# Patient Record
Sex: MALE | Race: WHITE | Employment: OTHER | URBAN - METROPOLITAN AREA
[De-identification: names, ages, dates, MRNs, and addresses within clinical notes are randomized per-mention and may not be internally consistent; named-entity substitution may affect disease eponyms.]

---

## 2020-05-02 ENCOUNTER — HOSPITAL ENCOUNTER (EMERGENCY)
Age: 70
Discharge: HOME | End: 2020-05-02
Attending: EMERGENCY MEDICINE
Payer: MEDICARE

## 2020-05-02 VITALS
SYSTOLIC BLOOD PRESSURE: 145 MMHG | OXYGEN SATURATION: 98 % | RESPIRATION RATE: 18 BRPM | WEIGHT: 315 LBS | BODY MASS INDEX: 38.36 KG/M2 | DIASTOLIC BLOOD PRESSURE: 87 MMHG | HEIGHT: 76 IN | HEART RATE: 100 BPM | TEMPERATURE: 98.3 F

## 2020-05-02 LAB
ANION GAP SERPL CALCULATED.3IONS-SCNC: 19 MMOL/L (ref 7–16)
BACTERIA: ABNORMAL /HPF
BASOPHILS ABSOLUTE: 0.06 E9/L (ref 0–0.2)
BASOPHILS RELATIVE PERCENT: 0.5 % (ref 0–2)
BILIRUBIN URINE: ABNORMAL
BLOOD, URINE: ABNORMAL
BUN BLDV-MCNC: 15 MG/DL (ref 8–23)
CALCIUM SERPL-MCNC: 9.5 MG/DL (ref 8.6–10.2)
CHLORIDE BLD-SCNC: 98 MMOL/L (ref 98–107)
CLARITY: ABNORMAL
CO2: 22 MMOL/L (ref 22–29)
COLOR: ABNORMAL
CREAT SERPL-MCNC: 1 MG/DL (ref 0.7–1.2)
EOSINOPHILS ABSOLUTE: 0.06 E9/L (ref 0.05–0.5)
EOSINOPHILS RELATIVE PERCENT: 0.5 % (ref 0–6)
EPITHELIAL CELLS, UA: ABNORMAL /HPF
GFR AFRICAN AMERICAN: >60
GFR NON-AFRICAN AMERICAN: >60 ML/MIN/1.73
GLUCOSE BLD-MCNC: 244 MG/DL (ref 74–99)
GLUCOSE URINE: 500 MG/DL
HCT VFR BLD CALC: 42.9 % (ref 37–54)
HEMOGLOBIN: 14.6 G/DL (ref 12.5–16.5)
IMMATURE GRANULOCYTES #: 0.05 E9/L
IMMATURE GRANULOCYTES %: 0.4 % (ref 0–5)
KETONES, URINE: NEGATIVE MG/DL
LEUKOCYTE ESTERASE, URINE: ABNORMAL
LYMPHOCYTES ABSOLUTE: 1.71 E9/L (ref 1.5–4)
LYMPHOCYTES RELATIVE PERCENT: 13.7 % (ref 20–42)
MAGNESIUM: 1.7 MG/DL (ref 1.6–2.6)
MCH RBC QN AUTO: 29 PG (ref 26–35)
MCHC RBC AUTO-ENTMCNC: 34 % (ref 32–34.5)
MCV RBC AUTO: 85.1 FL (ref 80–99.9)
MONOCYTES ABSOLUTE: 0.83 E9/L (ref 0.1–0.95)
MONOCYTES RELATIVE PERCENT: 6.6 % (ref 2–12)
NEUTROPHILS ABSOLUTE: 9.81 E9/L (ref 1.8–7.3)
NEUTROPHILS RELATIVE PERCENT: 78.3 % (ref 43–80)
NITRITE, URINE: NEGATIVE
PDW BLD-RTO: 13.7 FL (ref 11.5–15)
PH UA: 5.5 (ref 5–9)
PLATELET # BLD: 179 E9/L (ref 130–450)
PMV BLD AUTO: 9.5 FL (ref 7–12)
POTASSIUM REFLEX MAGNESIUM: 3.3 MMOL/L (ref 3.5–5)
PROTEIN UA: >=300 MG/DL
RBC # BLD: 5.04 E12/L (ref 3.8–5.8)
RBC UA: ABNORMAL /HPF (ref 0–2)
SODIUM BLD-SCNC: 139 MMOL/L (ref 132–146)
SPECIFIC GRAVITY UA: >=1.03 (ref 1–1.03)
TROPONIN: <0.01 NG/ML (ref 0–0.03)
UROBILINOGEN, URINE: 1 E.U./DL
WBC # BLD: 12.5 E9/L (ref 4.5–11.5)
WBC UA: >20 /HPF (ref 0–5)

## 2020-05-02 PROCEDURE — 84484 ASSAY OF TROPONIN QUANT: CPT

## 2020-05-02 PROCEDURE — 80048 BASIC METABOLIC PNL TOTAL CA: CPT

## 2020-05-02 PROCEDURE — 51702 INSERT TEMP BLADDER CATH: CPT

## 2020-05-02 PROCEDURE — 81001 URINALYSIS AUTO W/SCOPE: CPT

## 2020-05-02 PROCEDURE — 85025 COMPLETE CBC W/AUTO DIFF WBC: CPT

## 2020-05-02 PROCEDURE — 99283 EMERGENCY DEPT VISIT LOW MDM: CPT

## 2020-05-02 PROCEDURE — 93005 ELECTROCARDIOGRAM TRACING: CPT | Performed by: STUDENT IN AN ORGANIZED HEALTH CARE EDUCATION/TRAINING PROGRAM

## 2020-05-02 PROCEDURE — 87077 CULTURE AEROBIC IDENTIFY: CPT

## 2020-05-02 PROCEDURE — 87088 URINE BACTERIA CULTURE: CPT

## 2020-05-02 PROCEDURE — 6370000000 HC RX 637 (ALT 250 FOR IP)

## 2020-05-02 PROCEDURE — 6370000000 HC RX 637 (ALT 250 FOR IP): Performed by: STUDENT IN AN ORGANIZED HEALTH CARE EDUCATION/TRAINING PROGRAM

## 2020-05-02 PROCEDURE — 83735 ASSAY OF MAGNESIUM: CPT

## 2020-05-02 PROCEDURE — 87186 SC STD MICRODIL/AGAR DIL: CPT

## 2020-05-02 PROCEDURE — 2580000003 HC RX 258: Performed by: STUDENT IN AN ORGANIZED HEALTH CARE EDUCATION/TRAINING PROGRAM

## 2020-05-02 RX ORDER — CIPROFLOXACIN 500 MG/1
500 TABLET, FILM COATED ORAL 2 TIMES DAILY
COMMUNITY
End: 2020-06-01

## 2020-05-02 RX ORDER — CEFDINIR 300 MG/1
300 CAPSULE ORAL ONCE
Status: COMPLETED | OUTPATIENT
Start: 2020-05-02 | End: 2020-05-02

## 2020-05-02 RX ORDER — POTASSIUM CHLORIDE 20 MEQ/1
20 TABLET, EXTENDED RELEASE ORAL ONCE
Status: COMPLETED | OUTPATIENT
Start: 2020-05-02 | End: 2020-05-02

## 2020-05-02 RX ORDER — 0.9 % SODIUM CHLORIDE 0.9 %
1000 INTRAVENOUS SOLUTION INTRAVENOUS ONCE
Status: COMPLETED | OUTPATIENT
Start: 2020-05-02 | End: 2020-05-02

## 2020-05-02 RX ORDER — CEFDINIR 300 MG/1
300 CAPSULE ORAL 2 TIMES DAILY
Qty: 14 CAPSULE | Refills: 0 | Status: SHIPPED | OUTPATIENT
Start: 2020-05-02 | End: 2020-05-09

## 2020-05-02 RX ORDER — CEFDINIR 300 MG/1
300 CAPSULE ORAL DAILY
Status: DISCONTINUED | OUTPATIENT
Start: 2020-05-03 | End: 2020-05-02

## 2020-05-02 RX ORDER — CEFDINIR 300 MG/1
CAPSULE ORAL
Status: COMPLETED
Start: 2020-05-02 | End: 2020-05-02

## 2020-05-02 RX ADMIN — SODIUM CHLORIDE 1000 ML: 9 INJECTION, SOLUTION INTRAVENOUS at 22:26

## 2020-05-02 RX ADMIN — CEFDINIR 300 MG: 300 CAPSULE ORAL at 21:31

## 2020-05-02 RX ADMIN — POTASSIUM CHLORIDE 20 MEQ: 1500 TABLET, EXTENDED RELEASE ORAL at 21:30

## 2020-05-02 ASSESSMENT — PAIN DESCRIPTION - PAIN TYPE: TYPE: ACUTE PAIN

## 2020-05-02 ASSESSMENT — ENCOUNTER SYMPTOMS
PHOTOPHOBIA: 0
ABDOMINAL PAIN: 0
SHORTNESS OF BREATH: 0
NAUSEA: 0
DIARRHEA: 0
ABDOMINAL DISTENTION: 0

## 2020-05-02 ASSESSMENT — PAIN DESCRIPTION - DESCRIPTORS: DESCRIPTORS: BURNING

## 2020-05-02 NOTE — ED PROVIDER NOTES
medical history of Diabetes mellitus ([facility] [address] Hypertension, and Sleep apnea. Past Surgical History:  has a past surgical history that includes Cholecystectomy (1995); Tonsillectomy; and TURP (08/04/2017). Social History:  reports that he has never smoked. He does not have any smokeless tobacco history on file. He reports that he does not drink alcohol or use drugs. Family History: family history is not on file. The patients home medications have been reviewed.     Allergies: Penicillins    -------------------------------------------------- RESULTS -------------------------------------------------  Labs:  Results for orders placed or performed during the hospital encounter of 05/02/20   Urinalysis with Microscopic   Result Value Ref Range    Color, UA FROILAN (A) Straw/Yellow    Clarity, UA CLOUDY (A) Clear    Glucose, Ur 500 (A) Negative mg/dL    Bilirubin Urine MODERATE (A) Negative    Ketones, Urine Negative Negative mg/dL    Specific Gravity, UA >=1.030 1.005 - 1.030    Blood, Urine LARGE (A) Negative    pH, UA 5.5 5.0 - 9.0    Protein, UA >=300 (A) Negative mg/dL    Urobilinogen, Urine 1.0 <2.0 E.U./dL    Nitrite, Urine Negative Negative    Leukocyte Esterase, Urine TRACE (A) Negative    WBC, UA >20 (A) 0 - 5 /HPF    RBC, UA PACKED 0 - 2 /HPF    Epithelial Cells, UA NONE SEEN /HPF    Bacteria, UA MANY (A) None Seen /HPF   CBC auto differential   Result Value Ref Range    WBC 12.5 (H) 4.5 - 11.5 E9/L    RBC 5.04 3.80 - 5.80 E12/L    Hemoglobin 14.6 12.5 - 16.5 g/dL    Hematocrit 42.9 37.0 - 54.0 %    MCV 85.1 80.0 - 99.9 fL    MCH 29.0 26.0 - 35.0 pg    MCHC 34.0 32.0 - 34.5 %    RDW 13.7 11.5 - 15.0 fL    Platelets 031 712 - 750 E9/L    MPV 9.5 7.0 - 12.0 fL    Neutrophils % 78.3 43.0 - 80.0 %    Immature Granulocytes % 0.4 0.0 - 5.0 %    Lymphocytes % 13.7 (L) 20.0 - 42.0 %    Monocytes % 6.6 2.0 - 12.0 %    Eosinophils % 0.5 0.0 - 6.0 %    Basophils % 0.5 0.0 - 2.0 %    Neutrophils Absolute 9.81 (H) 1.80 - 7.30 E9/L    Immature Granulocytes # 0.05 E9/L    Lymphocytes Absolute 1.71 1.50 - 4.00 E9/L    Monocytes Absolute 0.83 0.10 - 0.95 E9/L    Eosinophils Absolute 0.06 0.05 - 0.50 E9/L    Basophils Absolute 0.06 0.00 - 0.20 J8/V   Basic Metabolic Panel w/ Reflex to MG   Result Value Ref Range    Sodium 139 132 - 146 mmol/L    Potassium reflex Magnesium 3.3 (L) 3.5 - 5.0 mmol/L    Chloride 98 98 - 107 mmol/L    CO2 22 22 - 29 mmol/L    Anion Gap 19 (H) 7 - 16 mmol/L    Glucose 244 (H) 74 - 99 mg/dL    BUN 15 8 - 23 mg/dL    CREATININE 1.0 0.7 - 1.2 mg/dL    GFR Non-African American >60 >=60 mL/min/1.73    GFR African American >60     Calcium 9.5 8.6 - 10.2 mg/dL   Magnesium   Result Value Ref Range    Magnesium 1.7 1.6 - 2.6 mg/dL   Troponin   Result Value Ref Range    Troponin <0.01 0.00 - 0.03 ng/mL   EKG 12 Lead   Result Value Ref Range    Ventricular Rate 117 BPM    Atrial Rate 117 BPM    P-R Interval 150 ms    QRS Duration 134 ms    Q-T Interval 358 ms    QTc Calculation (Bazett) 499 ms    P Axis 54 degrees    R Axis -92 degrees    T Axis 36 degrees       Radiology:  No orders to display       ------------------------- NURSING NOTES AND VITALS REVIEWED ---------------------------  Date / Time Roomed:  5/2/2020  7:51 PM  ED Bed Assignment:  07/07    The nursing notes within the ED encounter and vital signs as below have been reviewed. BP (!) 145/87   Pulse 100   Temp 98.3 °F (36.8 °C) (Oral)   Resp 18   Ht 6' 4\" (1.93 m)   Wt (!) 350 lb (158.8 kg)   SpO2 98%   BMI 42.60 kg/m²   Oxygen Saturation Interpretation: Normal      ------------------------------------------ PROGRESS NOTES ------------------------------------------  12:11 PM EDT  I have spoken with the patient and discussed todays results, in addition to providing specific details for the plan of care and counseling regarding the diagnosis and prognosis. Their questions are answered at this time and they are agreeable with the plan.  I

## 2020-05-03 LAB
EKG ATRIAL RATE: 117 BPM
EKG P AXIS: 54 DEGREES
EKG P-R INTERVAL: 150 MS
EKG Q-T INTERVAL: 358 MS
EKG QRS DURATION: 134 MS
EKG QTC CALCULATION (BAZETT): 499 MS
EKG R AXIS: -92 DEGREES
EKG T AXIS: 36 DEGREES
EKG VENTRICULAR RATE: 117 BPM

## 2020-05-03 NOTE — ED NOTES
RN provided teaching of carney cath. RN provided a leg bag and standard. Pt states that he would like to leave standard on because he will be going to bed when he gets home. Pt verbalized understanding. Pt called wife for ride. PT was ambulatory upon discharge.       Nancy Ortiz RN  05/02/20 0069

## 2020-05-06 LAB
ORGANISM: ABNORMAL
URINE CULTURE, ROUTINE: ABNORMAL

## 2020-05-28 ENCOUNTER — PREP FOR PROCEDURE (OUTPATIENT)
Dept: UROLOGY | Age: 70
End: 2020-05-28

## 2020-05-28 RX ORDER — SODIUM CHLORIDE 9 MG/ML
INJECTION, SOLUTION INTRAVENOUS CONTINUOUS
Status: CANCELLED | OUTPATIENT
Start: 2020-05-28

## 2020-05-28 RX ORDER — SODIUM CHLORIDE 0.9 % (FLUSH) 0.9 %
10 SYRINGE (ML) INJECTION PRN
Status: CANCELLED | OUTPATIENT
Start: 2020-05-28

## 2020-05-28 RX ORDER — CIPROFLOXACIN 2 MG/ML
400 INJECTION, SOLUTION INTRAVENOUS
Status: CANCELLED | OUTPATIENT
Start: 2020-05-28 | End: 2020-05-28

## 2020-05-28 RX ORDER — SODIUM CHLORIDE 0.9 % (FLUSH) 0.9 %
10 SYRINGE (ML) INJECTION EVERY 12 HOURS SCHEDULED
Status: CANCELLED | OUTPATIENT
Start: 2020-05-28

## 2020-06-01 ENCOUNTER — HOSPITAL ENCOUNTER (OUTPATIENT)
Dept: PREADMISSION TESTING | Age: 70
Discharge: HOME | End: 2020-06-01
Payer: MEDICARE

## 2020-06-01 ENCOUNTER — HOSPITAL ENCOUNTER (OUTPATIENT)
Age: 70
Discharge: HOME | End: 2020-06-03
Payer: MEDICARE

## 2020-06-01 VITALS
OXYGEN SATURATION: 95 % | HEIGHT: 74 IN | TEMPERATURE: 97 F | DIASTOLIC BLOOD PRESSURE: 91 MMHG | HEART RATE: 106 BPM | RESPIRATION RATE: 20 BRPM | WEIGHT: 315 LBS | BODY MASS INDEX: 40.43 KG/M2 | SYSTOLIC BLOOD PRESSURE: 157 MMHG

## 2020-06-01 LAB
ANION GAP SERPL CALCULATED.3IONS-SCNC: 14 MMOL/L (ref 7–16)
BUN BLDV-MCNC: 15 MG/DL (ref 8–23)
CALCIUM SERPL-MCNC: 9.6 MG/DL (ref 8.6–10.2)
CHLORIDE BLD-SCNC: 98 MMOL/L (ref 98–107)
CO2: 27 MMOL/L (ref 22–29)
CREAT SERPL-MCNC: 1 MG/DL (ref 0.7–1.2)
GFR AFRICAN AMERICAN: >60
GFR NON-AFRICAN AMERICAN: >60 ML/MIN/1.73
GLUCOSE BLD-MCNC: 209 MG/DL (ref 74–99)
HCT VFR BLD CALC: 42.3 % (ref 37–54)
HEMOGLOBIN: 14.2 G/DL (ref 12.5–16.5)
MAGNESIUM: 2 MG/DL (ref 1.6–2.6)
MCH RBC QN AUTO: 29.1 PG (ref 26–35)
MCHC RBC AUTO-ENTMCNC: 33.6 % (ref 32–34.5)
MCV RBC AUTO: 86.7 FL (ref 80–99.9)
PDW BLD-RTO: 13.9 FL (ref 11.5–15)
PLATELET # BLD: 187 E9/L (ref 130–450)
PMV BLD AUTO: 9.2 FL (ref 7–12)
POTASSIUM REFLEX MAGNESIUM: 3.4 MMOL/L (ref 3.5–5)
RBC # BLD: 4.88 E12/L (ref 3.8–5.8)
SODIUM BLD-SCNC: 139 MMOL/L (ref 132–146)
WBC # BLD: 7.4 E9/L (ref 4.5–11.5)

## 2020-06-01 PROCEDURE — 36415 COLL VENOUS BLD VENIPUNCTURE: CPT

## 2020-06-01 PROCEDURE — 85027 COMPLETE CBC AUTOMATED: CPT

## 2020-06-01 PROCEDURE — 83735 ASSAY OF MAGNESIUM: CPT

## 2020-06-01 PROCEDURE — U0003 INFECTIOUS AGENT DETECTION BY NUCLEIC ACID (DNA OR RNA); SEVERE ACUTE RESPIRATORY SYNDROME CORONAVIRUS 2 (SARS-COV-2) (CORONAVIRUS DISEASE [COVID-19]), AMPLIFIED PROBE TECHNIQUE, MAKING USE OF HIGH THROUGHPUT TECHNOLOGIES AS DESCRIBED BY CMS-2020-01-R: HCPCS

## 2020-06-01 PROCEDURE — 80048 BASIC METABOLIC PNL TOTAL CA: CPT

## 2020-06-01 RX ORDER — MULTIVIT-MIN/FERROUS GLUCONATE 9 MG/15 ML
15 LIQUID (ML) ORAL DAILY
COMMUNITY

## 2020-06-01 RX ORDER — BETHANECHOL CHLORIDE 25 MG/1
25 TABLET ORAL 3 TIMES DAILY
Status: ON HOLD | COMMUNITY
End: 2022-03-24

## 2020-06-01 RX ORDER — OXYMETAZOLINE HYDROCHLORIDE 0.05 G/100ML
2 SPRAY NASAL PRN
COMMUNITY

## 2020-06-03 LAB
SARS-COV-2: NOT DETECTED
SOURCE: NORMAL

## 2020-06-05 ENCOUNTER — HOSPITAL ENCOUNTER (OUTPATIENT)
Age: 70
Setting detail: OUTPATIENT SURGERY
Discharge: HOME | End: 2020-06-05
Attending: UROLOGY | Admitting: UROLOGY
Payer: MEDICARE

## 2020-06-05 ENCOUNTER — ANESTHESIA EVENT (OUTPATIENT)
Dept: OPERATING ROOM | Age: 70
End: 2020-06-05
Payer: MEDICARE

## 2020-06-05 ENCOUNTER — ANESTHESIA (OUTPATIENT)
Dept: OPERATING ROOM | Age: 70
End: 2020-06-05
Payer: MEDICARE

## 2020-06-05 ENCOUNTER — HOSPITAL ENCOUNTER (OUTPATIENT)
Dept: GENERAL RADIOLOGY | Age: 70
Discharge: HOME | End: 2020-06-07
Payer: MEDICARE

## 2020-06-05 VITALS
DIASTOLIC BLOOD PRESSURE: 51 MMHG | SYSTOLIC BLOOD PRESSURE: 73 MMHG | OXYGEN SATURATION: 97 % | RESPIRATION RATE: 13 BRPM

## 2020-06-05 VITALS
RESPIRATION RATE: 18 BRPM | SYSTOLIC BLOOD PRESSURE: 140 MMHG | DIASTOLIC BLOOD PRESSURE: 77 MMHG | WEIGHT: 315 LBS | HEART RATE: 64 BPM | TEMPERATURE: 97.4 F | BODY MASS INDEX: 40.43 KG/M2 | OXYGEN SATURATION: 98 % | HEIGHT: 74 IN

## 2020-06-05 LAB — METER GLUCOSE: 192 MG/DL (ref 74–99)

## 2020-06-05 PROCEDURE — 74400 UROGRAPHY IV +-KUB TOMOG: CPT

## 2020-06-05 PROCEDURE — 6360000002 HC RX W HCPCS: Performed by: NURSE ANESTHETIST, CERTIFIED REGISTERED

## 2020-06-05 PROCEDURE — C1758 CATHETER, URETERAL: HCPCS | Performed by: UROLOGY

## 2020-06-05 PROCEDURE — 6360000002 HC RX W HCPCS: Performed by: NURSE PRACTITIONER

## 2020-06-05 PROCEDURE — 7100000000 HC PACU RECOVERY - FIRST 15 MIN: Performed by: UROLOGY

## 2020-06-05 PROCEDURE — 3700000001 HC ADD 15 MINUTES (ANESTHESIA): Performed by: UROLOGY

## 2020-06-05 PROCEDURE — 2580000003 HC RX 258: Performed by: NURSE PRACTITIONER

## 2020-06-05 PROCEDURE — 3600000014 HC SURGERY LEVEL 4 ADDTL 15MIN: Performed by: UROLOGY

## 2020-06-05 PROCEDURE — 88305 TISSUE EXAM BY PATHOLOGIST: CPT

## 2020-06-05 PROCEDURE — 2709999900 HC NON-CHARGEABLE SUPPLY: Performed by: UROLOGY

## 2020-06-05 PROCEDURE — 3600000004 HC SURGERY LEVEL 4 BASE: Performed by: UROLOGY

## 2020-06-05 PROCEDURE — 7100000011 HC PHASE II RECOVERY - ADDTL 15 MIN: Performed by: UROLOGY

## 2020-06-05 PROCEDURE — 2720000010 HC SURG SUPPLY STERILE: Performed by: UROLOGY

## 2020-06-05 PROCEDURE — 7100000001 HC PACU RECOVERY - ADDTL 15 MIN: Performed by: UROLOGY

## 2020-06-05 PROCEDURE — 6360000004 HC RX CONTRAST MEDICATION: Performed by: UROLOGY

## 2020-06-05 PROCEDURE — 2500000003 HC RX 250 WO HCPCS: Performed by: NURSE ANESTHETIST, CERTIFIED REGISTERED

## 2020-06-05 PROCEDURE — 82962 GLUCOSE BLOOD TEST: CPT

## 2020-06-05 PROCEDURE — 7100000010 HC PHASE II RECOVERY - FIRST 15 MIN: Performed by: UROLOGY

## 2020-06-05 PROCEDURE — 3700000000 HC ANESTHESIA ATTENDED CARE: Performed by: UROLOGY

## 2020-06-05 RX ORDER — FENTANYL CITRATE 50 UG/ML
INJECTION, SOLUTION INTRAMUSCULAR; INTRAVENOUS PRN
Status: DISCONTINUED | OUTPATIENT
Start: 2020-06-05 | End: 2020-06-05 | Stop reason: SDUPTHER

## 2020-06-05 RX ORDER — CIPROFLOXACIN 2 MG/ML
400 INJECTION, SOLUTION INTRAVENOUS
Status: COMPLETED | OUTPATIENT
Start: 2020-06-05 | End: 2020-06-05

## 2020-06-05 RX ORDER — SODIUM CHLORIDE 0.9 % (FLUSH) 0.9 %
10 SYRINGE (ML) INJECTION EVERY 12 HOURS SCHEDULED
Status: DISCONTINUED | OUTPATIENT
Start: 2020-06-05 | End: 2020-06-05 | Stop reason: HOSPADM

## 2020-06-05 RX ORDER — MEPERIDINE HYDROCHLORIDE 25 MG/ML
12.5 INJECTION INTRAMUSCULAR; INTRAVENOUS; SUBCUTANEOUS EVERY 5 MIN PRN
Status: DISCONTINUED | OUTPATIENT
Start: 2020-06-05 | End: 2020-06-05 | Stop reason: HOSPADM

## 2020-06-05 RX ORDER — EPHEDRINE SULFATE/0.9% NACL/PF 50 MG/5 ML
SYRINGE (ML) INTRAVENOUS PRN
Status: DISCONTINUED | OUTPATIENT
Start: 2020-06-05 | End: 2020-06-05 | Stop reason: SDUPTHER

## 2020-06-05 RX ORDER — LIDOCAINE HYDROCHLORIDE 20 MG/ML
INJECTION, SOLUTION INTRAVENOUS PRN
Status: DISCONTINUED | OUTPATIENT
Start: 2020-06-05 | End: 2020-06-05 | Stop reason: SDUPTHER

## 2020-06-05 RX ORDER — SODIUM CHLORIDE 9 MG/ML
INJECTION, SOLUTION INTRAVENOUS CONTINUOUS
Status: DISCONTINUED | OUTPATIENT
Start: 2020-06-05 | End: 2020-06-05 | Stop reason: HOSPADM

## 2020-06-05 RX ORDER — MIDAZOLAM HYDROCHLORIDE 1 MG/ML
INJECTION INTRAMUSCULAR; INTRAVENOUS PRN
Status: DISCONTINUED | OUTPATIENT
Start: 2020-06-05 | End: 2020-06-05 | Stop reason: SDUPTHER

## 2020-06-05 RX ORDER — PROPOFOL 10 MG/ML
INJECTION, EMULSION INTRAVENOUS CONTINUOUS PRN
Status: DISCONTINUED | OUTPATIENT
Start: 2020-06-05 | End: 2020-06-05 | Stop reason: SDUPTHER

## 2020-06-05 RX ORDER — OXYCODONE HYDROCHLORIDE AND ACETAMINOPHEN 5; 325 MG/1; MG/1
1 TABLET ORAL EVERY 4 HOURS PRN
Qty: 10 TABLET | Refills: 0 | Status: SHIPPED | OUTPATIENT
Start: 2020-06-05 | End: 2020-06-12

## 2020-06-05 RX ORDER — SODIUM CHLORIDE 0.9 % (FLUSH) 0.9 %
10 SYRINGE (ML) INJECTION PRN
Status: DISCONTINUED | OUTPATIENT
Start: 2020-06-05 | End: 2020-06-05 | Stop reason: HOSPADM

## 2020-06-05 RX ORDER — CIPROFLOXACIN 500 MG/1
500 TABLET, FILM COATED ORAL 2 TIMES DAILY
Qty: 10 TABLET | Refills: 0 | Status: SHIPPED | OUTPATIENT
Start: 2020-06-05 | End: 2020-06-10

## 2020-06-05 RX ORDER — ONDANSETRON 2 MG/ML
4 INJECTION INTRAMUSCULAR; INTRAVENOUS
Status: DISCONTINUED | OUTPATIENT
Start: 2020-06-05 | End: 2020-06-05 | Stop reason: HOSPADM

## 2020-06-05 RX ADMIN — PHENYLEPHRINE HYDROCHLORIDE 100 MCG: 10 INJECTION INTRAVENOUS at 13:35

## 2020-06-05 RX ADMIN — FENTANYL CITRATE 25 MCG: 50 INJECTION, SOLUTION INTRAMUSCULAR; INTRAVENOUS at 13:00

## 2020-06-05 RX ADMIN — PROPOFOL 100 MCG/KG/MIN: 10 INJECTION, EMULSION INTRAVENOUS at 13:12

## 2020-06-05 RX ADMIN — MIDAZOLAM 2 MG: 1 INJECTION INTRAMUSCULAR; INTRAVENOUS at 12:31

## 2020-06-05 RX ADMIN — PHENYLEPHRINE HYDROCHLORIDE 300 MCG: 10 INJECTION INTRAVENOUS at 13:18

## 2020-06-05 RX ADMIN — FENTANYL CITRATE 50 MCG: 50 INJECTION, SOLUTION INTRAMUSCULAR; INTRAVENOUS at 12:32

## 2020-06-05 RX ADMIN — Medication 10 MG: at 13:25

## 2020-06-05 RX ADMIN — PHENYLEPHRINE HYDROCHLORIDE 200 MCG: 10 INJECTION INTRAVENOUS at 13:15

## 2020-06-05 RX ADMIN — FENTANYL CITRATE 50 MCG: 50 INJECTION, SOLUTION INTRAMUSCULAR; INTRAVENOUS at 12:51

## 2020-06-05 RX ADMIN — LIDOCAINE HYDROCHLORIDE 20 MG: 20 INJECTION, SOLUTION INTRAVENOUS at 12:31

## 2020-06-05 RX ADMIN — SODIUM CHLORIDE: 9 INJECTION, SOLUTION INTRAVENOUS at 12:20

## 2020-06-05 RX ADMIN — CIPROFLOXACIN 400 MG: 2 INJECTION INTRAVENOUS at 12:22

## 2020-06-05 RX ADMIN — PHENYLEPHRINE HYDROCHLORIDE 200 MCG: 10 INJECTION INTRAVENOUS at 13:13

## 2020-06-05 RX ADMIN — SODIUM CHLORIDE: 9 INJECTION, SOLUTION INTRAVENOUS at 12:27

## 2020-06-05 RX ADMIN — PROPOFOL 75 MCG/KG/MIN: 10 INJECTION, EMULSION INTRAVENOUS at 12:31

## 2020-06-05 RX ADMIN — PHENYLEPHRINE HYDROCHLORIDE 100 MCG: 10 INJECTION INTRAVENOUS at 13:12

## 2020-06-05 ASSESSMENT — PULMONARY FUNCTION TESTS
PIF_VALUE: 1
PIF_VALUE: 0
PIF_VALUE: 1
PIF_VALUE: 0
PIF_VALUE: 1
PIF_VALUE: 0
PIF_VALUE: 1
PIF_VALUE: 0
PIF_VALUE: 1

## 2020-06-05 ASSESSMENT — PAIN SCALES - GENERAL
PAINLEVEL_OUTOF10: 2
PAINLEVEL_OUTOF10: 0
PAINLEVEL_OUTOF10: 2
PAINLEVEL_OUTOF10: 0

## 2020-06-05 ASSESSMENT — PAIN - FUNCTIONAL ASSESSMENT: PAIN_FUNCTIONAL_ASSESSMENT: 0-10

## 2020-06-05 ASSESSMENT — PAIN DESCRIPTION - PAIN TYPE: TYPE: SURGICAL PAIN

## 2020-06-05 NOTE — PROGRESS NOTES
Pt d/c instructions reviewed with pt. Pt carney remains at d/c. Carney instructions given to pt. PT verbalized understanding of  d/c instructions. D/C home with wife via private car.

## 2020-06-05 NOTE — H&P
6/5/2020 12:15 PM  Service: Urology  Group: [facility] ([facility])    No Wasserman  [id number]     Chief Complaint: Urinary retention    History of Present Illness: The patient is a 81 y.o. male patient who presents with the above  The patient is know to my Dad  He did have a TURP in 2017  He did have 11 grams removed  He does have a carney in  This has been present for about a month  He has been on afrin nasal spray  He was given voiding x2  He has failed both      Past Medical History:   Diagnosis Date    Arthritis     lower back    Asthma     Diabetes mellitus ([address]     diet controlled    Hypertension     Sleep apnea        Past Surgical History:   Procedure Laterality Date    CHOLECYSTECTOMY  1995    TONSILLECTOMY      TURP  08/04/2017    cr       Medications Prior to Admission:    Medications Prior to Admission: bethanechol (URECHOLINE) 25 MG tablet, Take 25 mg by mouth 3 times daily  sodium chloride (OCEAN, BABY AYR) 0.65 % nasal spray, 1 spray by Nasal route as needed for Congestion  oxymetazoline (AFRIN) 0.05 % nasal spray, 2 sprays by Nasal route 2 times daily  tamsulosin (FLOMAX) 0.4 MG capsule, Take 0.4 mg by mouth 2 times daily   terazosin (HYTRIN) 1 MG capsule, Take 1 mg by mouth nightly  Lisinopril-Hydrochlorothiazide (PRINZIDE PO), Take 50 mg by mouth Indications: dose is 50/25mg Dose 20/12.5 mg  diphenhydrAMINE (BENADRYL) 25 MG tablet, Take 50 mg by mouth every 6 hours as needed for Itching  Multiple Vitamins-Minerals (CENTRUM/CERTA-ANABEL WITH MINERALS ORAL) solution, Take 15 mLs by mouth daily  Multiple Vitamin (THERAPEUTIC) TABS tablet, Take 1 tablet by mouth daily  Ascorbic Acid (VITAMIN C) 500 MG tablet, Take 500 mg by mouth daily  Misc Natural Products (GLUCOSAMINE CHOND COMPLEX/MSM PO), Take 1 tablet by mouth    Allergies:    Penicillins    Social History:    reports that he has never smoked.  He has never used smokeless tobacco. He reports that he does not drink alcohol or use

## 2020-06-05 NOTE — ANESTHESIA PRE PROCEDURE
Department of Anesthesiology  Preprocedure Note       Name:  Prateek Dobson   Age:  81 y.o.  :  1950                                          MRN:  [id number]         Date:  2020      Surgeon: Kingsley Gaines):  Jose OLIVA Memo, DO    Procedure: Procedure(s):  CYSTOSCOPY, RETROGRADE, PYELOGRAM. CYSTOGRAM. POSS TRANSURETHRAL RESECTION PROSTATE    Medications prior to admission:   Prior to Admission medications    Medication Sig Start Date End Date Taking?  Authorizing Provider   bethanechol (URECHOLINE) 25 MG tablet Take 25 mg by mouth 3 times daily    Historical Provider, MD   Multiple Vitamins-Minerals (CENTRUM/CERTA-ANABEL WITH MINERALS ORAL) solution Take 15 mLs by mouth daily    Historical Provider, MD   sodium chloride (OCEAN, BABY AYR) 0.65 % nasal spray 1 spray by Nasal route as needed for Congestion    Historical Provider, MD   oxymetazoline (AFRIN) 0.05 % nasal spray 2 sprays by Nasal route 2 times daily    Historical Provider, MD   tamsulosin (FLOMAX) 0.4 MG capsule Take 0.4 mg by mouth 2 times daily     Historical Provider, MD   terazosin (HYTRIN) 1 MG capsule Take 1 mg by mouth nightly    Historical Provider, MD   Lisinopril-Hydrochlorothiazide (PRINZIDE PO) Take 50 mg by mouth Indications: dose is 50/25mg Dose 20/12.5 mg    Historical Provider, MD   diphenhydrAMINE (BENADRYL) 25 MG tablet Take 50 mg by mouth every 6 hours as needed for Itching    Historical Provider, MD   Multiple Vitamin (THERAPEUTIC) TABS tablet Take 1 tablet by mouth daily    Historical Provider, MD   Ascorbic Acid (VITAMIN C) 500 MG tablet Take 500 mg by mouth daily    Historical Provider, MD   Misc Natural Products (GLUCOSAMINE CHOND COMPLEX/MSM PO) Take 1 tablet by mouth    Historical Provider, MD       Current medications:    Current Facility-Administered Medications   Medication Dose Route Frequency Provider Last Rate Last Dose    0.9 % sodium chloride infusion   Intravenous Continuous Cuca Quevedo, APRN - CNP        06/01/2020    GLUCOSE 209 06/01/2020    CALCIUM 9.6 06/01/2020       POC Tests: No results for input(s): POCGLU, POCNA, POCK, POCCL, POCBUN, POCHEMO, POCHCT in the last 72 hours. Coags: No results found for: PROTIME, INR, APTT    HCG (If Applicable): No results found for: PREGTESTUR, PREGSERUM, HCG, HCGQUANT     ABGs: No results found for: PHART, PO2ART, QPL6FVY, RJX0WBN, BEART, T2YDDQHH     Type & Screen (If Applicable):  No results found for: LABABO, LABRH    Drug/Infectious Status (If Applicable):  No results found for: HIV, HEPCAB    COVID-19 Screening (If Applicable):   Lab Results   Component Value Date    COVID19 Not Detected 06/01/2020         Anesthesia Evaluation  Patient summary reviewed  Airway: Mallampati: III  TM distance: >3 FB   Neck ROM: full  Mouth opening: > = 3 FB Dental: normal exam         Pulmonary: breath sounds clear to auscultation  (+) sleep apnea: on CPAP,  asthma:                            Cardiovascular:    (+) hypertension: moderate,       ECG reviewed  Rhythm: regular  Rate: normal                    Neuro/Psych:   Negative Neuro/Psych ROS              GI/Hepatic/Renal:   (+) morbid obesity          Endo/Other:    (+) DiabetesType II DM, , .                 Abdominal:   (+) obese,         Vascular: negative vascular ROS. Anesthesia Plan      MAC     ASA 3     (GA as back up)  Induction: intravenous. BIS  MIPS: Postoperative opioids intended and Prophylactic antiemetics administered. Anesthetic plan and risks discussed with patient. Plan discussed with CRNA.                 Lucero Thapa MD   6/5/2020

## 2020-06-06 NOTE — OP NOTE
the right ureteral  orifice was intact. I did collect the specimen and sent off for  pathologic specimen. At the end of the case, I removed the cystoscope. He had great stream at 40. Please note that I did also take substantial  amount of time making sure that there was appropriate hemostasis. At  the end of the case, I used a 24 3-way Simplastic and placed it on the  continuous bladder irrigation with 30 mL in the balloon. He woke from  anesthesia without complication and brought to PACU in stable condition. PLAN:  1. Go to PACU. 2.  Since the blood loss was minimal and he was under a MAC and that the  TURP was only about 10 gm, I am going to see back and send the patient  home today with the Raymond catheter. 3.  He tolerated the procedure quite well. 4.  Findings were conveyed to his wife who is present. 5.  He will come into the office on Tuesday. 6.  The Raymond catheter can be removed at that time. 7.  The patient may be having some double bubble voiding from the large  bladder diverticulum. 8.  He is not able to do clean intermittent catheterization suprapubic  tube and/or a bladder diverticulectomy because of his manual dexterity  and his panniculus. 9.  He tolerated the procedure quite well. 10.  Please send a carbon copy to his primary care physician. 11. There were no intraoperative complications.         Parish Jimenez DO    D: 06/05/2020 13:49:09       T: 06/05/2020 15:29:21     [email or web address]  Job#: [id number]     Doc#: [id number]    CC:  Celi Levine MD

## 2020-06-24 ENCOUNTER — APPOINTMENT (OUTPATIENT)
Dept: ULTRASOUND IMAGING | Age: 70
DRG: 862 | End: 2020-06-24
Payer: MEDICARE

## 2020-06-24 ENCOUNTER — HOSPITAL ENCOUNTER (INPATIENT)
Age: 70
LOS: 5 days | Discharge: HOME | DRG: 862 | End: 2020-06-29
Attending: EMERGENCY MEDICINE | Admitting: INTERNAL MEDICINE
Payer: MEDICARE

## 2020-06-24 ENCOUNTER — APPOINTMENT (OUTPATIENT)
Dept: GENERAL RADIOLOGY | Age: 70
DRG: 862 | End: 2020-06-24
Payer: MEDICARE

## 2020-06-24 PROBLEM — U07.1 COVID-19 VIRUS INFECTION: Status: ACTIVE | Noted: 2020-06-24

## 2020-06-24 PROBLEM — A41.9 SEPSIS (HCC): Status: ACTIVE | Noted: 2020-06-24

## 2020-06-24 LAB
ALBUMIN SERPL-MCNC: 3.3 G/DL (ref 3.5–5.2)
ALP BLD-CCNC: 61 U/L (ref 40–129)
ALT SERPL-CCNC: 29 U/L (ref 0–40)
ANION GAP SERPL CALCULATED.3IONS-SCNC: 14 MMOL/L (ref 7–16)
APTT: 25.9 SEC (ref 24.5–35.1)
AST SERPL-CCNC: 24 U/L (ref 0–39)
BACTERIA: ABNORMAL /HPF
BASOPHILS ABSOLUTE: 0.03 E9/L (ref 0–0.2)
BASOPHILS RELATIVE PERCENT: 0.4 % (ref 0–2)
BILIRUB SERPL-MCNC: 0.5 MG/DL (ref 0–1.2)
BILIRUBIN URINE: NEGATIVE
BLOOD, URINE: ABNORMAL
BUN BLDV-MCNC: 17 MG/DL (ref 8–23)
CALCIUM SERPL-MCNC: 8.5 MG/DL (ref 8.6–10.2)
CHLORIDE BLD-SCNC: 90 MMOL/L (ref 98–107)
CLARITY: ABNORMAL
CO2: 28 MMOL/L (ref 22–29)
COLOR: YELLOW
CREAT SERPL-MCNC: 1.2 MG/DL (ref 0.7–1.2)
EOSINOPHILS ABSOLUTE: 0 E9/L (ref 0.05–0.5)
EOSINOPHILS RELATIVE PERCENT: 0 % (ref 0–6)
GFR AFRICAN AMERICAN: >60
GFR NON-AFRICAN AMERICAN: >60 ML/MIN/1.73
GLUCOSE BLD-MCNC: 217 MG/DL (ref 74–99)
GLUCOSE URINE: NEGATIVE MG/DL
HCT VFR BLD CALC: 35.7 % (ref 37–54)
HEMOGLOBIN: 12.2 G/DL (ref 12.5–16.5)
IMMATURE GRANULOCYTES #: 0.05 E9/L
IMMATURE GRANULOCYTES %: 0.6 % (ref 0–5)
INR BLD: 1.3
KETONES, URINE: NEGATIVE MG/DL
LACTIC ACID, SEPSIS: 1.1 MMOL/L (ref 0.5–1.9)
LACTIC ACID, SEPSIS: 1.5 MMOL/L (ref 0.5–1.9)
LEUKOCYTE ESTERASE, URINE: ABNORMAL
LIPASE: 12 U/L (ref 13–60)
LYMPHOCYTES ABSOLUTE: 0.62 E9/L (ref 1.5–4)
LYMPHOCYTES RELATIVE PERCENT: 7.9 % (ref 20–42)
MCH RBC QN AUTO: 29.1 PG (ref 26–35)
MCHC RBC AUTO-ENTMCNC: 34.2 % (ref 32–34.5)
MCV RBC AUTO: 85.2 FL (ref 80–99.9)
MONOCYTES ABSOLUTE: 0.42 E9/L (ref 0.1–0.95)
MONOCYTES RELATIVE PERCENT: 5.3 % (ref 2–12)
NEUTROPHILS ABSOLUTE: 6.77 E9/L (ref 1.8–7.3)
NEUTROPHILS RELATIVE PERCENT: 85.8 % (ref 43–80)
NITRITE, URINE: POSITIVE
PDW BLD-RTO: 13.4 FL (ref 11.5–15)
PH UA: 6 (ref 5–9)
PLATELET # BLD: 146 E9/L (ref 130–450)
PMV BLD AUTO: 9.5 FL (ref 7–12)
POTASSIUM SERPL-SCNC: 2.9 MMOL/L (ref 3.5–5)
PROTEIN UA: 30 MG/DL
PROTHROMBIN TIME: 15.1 SEC (ref 9.3–12.4)
RBC # BLD: 4.19 E12/L (ref 3.8–5.8)
RBC UA: ABNORMAL /HPF (ref 0–2)
SODIUM BLD-SCNC: 132 MMOL/L (ref 132–146)
SPECIFIC GRAVITY UA: 1.02 (ref 1–1.03)
TOTAL PROTEIN: 6.4 G/DL (ref 6.4–8.3)
UROBILINOGEN, URINE: 0.2 E.U./DL
WBC # BLD: 7.9 E9/L (ref 4.5–11.5)
WBC UA: ABNORMAL /HPF (ref 0–5)

## 2020-06-24 PROCEDURE — 85610 PROTHROMBIN TIME: CPT

## 2020-06-24 PROCEDURE — 93970 EXTREMITY STUDY: CPT

## 2020-06-24 PROCEDURE — 85025 COMPLETE CBC W/AUTO DIFF WBC: CPT

## 2020-06-24 PROCEDURE — 94760 N-INVAS EAR/PLS OXIMETRY 1: CPT

## 2020-06-24 PROCEDURE — 99285 EMERGENCY DEPT VISIT HI MDM: CPT

## 2020-06-24 PROCEDURE — 71045 X-RAY EXAM CHEST 1 VIEW: CPT

## 2020-06-24 PROCEDURE — 83605 ASSAY OF LACTIC ACID: CPT

## 2020-06-24 PROCEDURE — 96365 THER/PROPH/DIAG IV INF INIT: CPT

## 2020-06-24 PROCEDURE — 87186 SC STD MICRODIL/AGAR DIL: CPT

## 2020-06-24 PROCEDURE — 6370000000 HC RX 637 (ALT 250 FOR IP): Performed by: INTERNAL MEDICINE

## 2020-06-24 PROCEDURE — 2580000003 HC RX 258: Performed by: EMERGENCY MEDICINE

## 2020-06-24 PROCEDURE — 2060000000 HC ICU INTERMEDIATE R&B

## 2020-06-24 PROCEDURE — 93005 ELECTROCARDIOGRAM TRACING: CPT | Performed by: EMERGENCY MEDICINE

## 2020-06-24 PROCEDURE — 87040 BLOOD CULTURE FOR BACTERIA: CPT

## 2020-06-24 PROCEDURE — 85730 THROMBOPLASTIN TIME PARTIAL: CPT

## 2020-06-24 PROCEDURE — 81001 URINALYSIS AUTO W/SCOPE: CPT

## 2020-06-24 PROCEDURE — 80053 COMPREHEN METABOLIC PANEL: CPT

## 2020-06-24 PROCEDURE — 6360000002 HC RX W HCPCS: Performed by: EMERGENCY MEDICINE

## 2020-06-24 PROCEDURE — 83690 ASSAY OF LIPASE: CPT

## 2020-06-24 PROCEDURE — 36415 COLL VENOUS BLD VENIPUNCTURE: CPT

## 2020-06-24 PROCEDURE — 6370000000 HC RX 637 (ALT 250 FOR IP): Performed by: EMERGENCY MEDICINE

## 2020-06-24 PROCEDURE — 87088 URINE BACTERIA CULTURE: CPT

## 2020-06-24 RX ORDER — ACETAMINOPHEN 325 MG/1
650 TABLET ORAL EVERY 6 HOURS PRN
Status: DISCONTINUED | OUTPATIENT
Start: 2020-06-24 | End: 2020-06-29 | Stop reason: HOSPADM

## 2020-06-24 RX ORDER — ACETAMINOPHEN 500 MG
1000 TABLET ORAL ONCE
Status: COMPLETED | OUTPATIENT
Start: 2020-06-24 | End: 2020-06-24

## 2020-06-24 RX ORDER — DIPHENHYDRAMINE HCL 25 MG
50 TABLET ORAL EVERY 6 HOURS PRN
Status: DISCONTINUED | OUTPATIENT
Start: 2020-06-24 | End: 2020-06-29 | Stop reason: HOSPADM

## 2020-06-24 RX ORDER — OXYMETAZOLINE HYDROCHLORIDE 0.05 G/100ML
2 SPRAY NASAL 2 TIMES DAILY
Status: DISPENSED | OUTPATIENT
Start: 2020-06-24 | End: 2020-06-27

## 2020-06-24 RX ORDER — SODIUM CHLORIDE 0.9 % (FLUSH) 0.9 %
10 SYRINGE (ML) INJECTION PRN
Status: DISCONTINUED | OUTPATIENT
Start: 2020-06-24 | End: 2020-06-29 | Stop reason: HOSPADM

## 2020-06-24 RX ORDER — ASCORBIC ACID 500 MG
500 TABLET ORAL DAILY
Status: DISCONTINUED | OUTPATIENT
Start: 2020-06-25 | End: 2020-06-29 | Stop reason: HOSPADM

## 2020-06-24 RX ORDER — BETHANECHOL CHLORIDE 25 MG/1
25 TABLET ORAL 3 TIMES DAILY
Status: DISCONTINUED | OUTPATIENT
Start: 2020-06-24 | End: 2020-06-29 | Stop reason: HOSPADM

## 2020-06-24 RX ORDER — CIPROFLOXACIN 500 MG/1
500 TABLET, FILM COATED ORAL 2 TIMES DAILY
Status: ON HOLD | COMMUNITY
Start: 2020-06-22 | End: 2020-06-29 | Stop reason: HOSPADM

## 2020-06-24 RX ORDER — SODIUM CHLORIDE 0.9 % (FLUSH) 0.9 %
10 SYRINGE (ML) INJECTION PRN
Status: DISCONTINUED | OUTPATIENT
Start: 2020-06-24 | End: 2020-06-24 | Stop reason: SDUPTHER

## 2020-06-24 RX ORDER — TAMSULOSIN HYDROCHLORIDE 0.4 MG/1
0.4 CAPSULE ORAL 2 TIMES DAILY
Status: DISCONTINUED | OUTPATIENT
Start: 2020-06-25 | End: 2020-06-27

## 2020-06-24 RX ORDER — TERAZOSIN 1 MG/1
1 CAPSULE ORAL NIGHTLY
Status: DISCONTINUED | OUTPATIENT
Start: 2020-06-24 | End: 2020-06-24 | Stop reason: CLARIF

## 2020-06-24 RX ORDER — POTASSIUM CHLORIDE 20 MEQ/1
40 TABLET, EXTENDED RELEASE ORAL ONCE
Status: COMPLETED | OUTPATIENT
Start: 2020-06-24 | End: 2020-06-24

## 2020-06-24 RX ORDER — POTASSIUM CHLORIDE 7.45 MG/ML
10 INJECTION INTRAVENOUS ONCE
Status: COMPLETED | OUTPATIENT
Start: 2020-06-24 | End: 2020-06-24

## 2020-06-24 RX ORDER — LISINOPRIL 5 MG/1
5 TABLET ORAL DAILY
Status: DISCONTINUED | OUTPATIENT
Start: 2020-06-24 | End: 2020-06-29 | Stop reason: HOSPADM

## 2020-06-24 RX ORDER — M-VIT,TX,IRON,MINS/CALC/FOLIC 27MG-0.4MG
1 TABLET ORAL DAILY
Status: DISCONTINUED | OUTPATIENT
Start: 2020-06-25 | End: 2020-06-29 | Stop reason: HOSPADM

## 2020-06-24 RX ORDER — 0.9 % SODIUM CHLORIDE 0.9 %
1000 INTRAVENOUS SOLUTION INTRAVENOUS ONCE
Status: COMPLETED | OUTPATIENT
Start: 2020-06-24 | End: 2020-06-24

## 2020-06-24 RX ORDER — ACETAMINOPHEN 650 MG/1
650 SUPPOSITORY RECTAL EVERY 6 HOURS PRN
Status: DISCONTINUED | OUTPATIENT
Start: 2020-06-24 | End: 2020-06-29 | Stop reason: HOSPADM

## 2020-06-24 RX ORDER — MULTIVIT-MIN/FERROUS GLUCONATE 9 MG/15 ML
15 LIQUID (ML) ORAL DAILY
Status: DISCONTINUED | OUTPATIENT
Start: 2020-06-25 | End: 2020-06-24 | Stop reason: SDUPTHER

## 2020-06-24 RX ORDER — SODIUM CHLORIDE 9 MG/ML
INJECTION, SOLUTION INTRAVENOUS EVERY 12 HOURS
Status: DISCONTINUED | OUTPATIENT
Start: 2020-06-25 | End: 2020-06-29 | Stop reason: HOSPADM

## 2020-06-24 RX ORDER — DOXAZOSIN MESYLATE 1 MG/1
1 TABLET ORAL NIGHTLY
Status: DISCONTINUED | OUTPATIENT
Start: 2020-06-24 | End: 2020-06-29 | Stop reason: HOSPADM

## 2020-06-24 RX ORDER — SODIUM CHLORIDE 0.9 % (FLUSH) 0.9 %
10 SYRINGE (ML) INJECTION EVERY 12 HOURS SCHEDULED
Status: DISCONTINUED | OUTPATIENT
Start: 2020-06-24 | End: 2020-06-29 | Stop reason: HOSPADM

## 2020-06-24 RX ORDER — SODIUM CHLORIDE 0.9 % (FLUSH) 0.9 %
10 SYRINGE (ML) INJECTION EVERY 12 HOURS SCHEDULED
Status: DISCONTINUED | OUTPATIENT
Start: 2020-06-24 | End: 2020-06-24 | Stop reason: SDUPTHER

## 2020-06-24 RX ADMIN — SODIUM CHLORIDE 1000 ML: 9 INJECTION, SOLUTION INTRAVENOUS at 14:36

## 2020-06-24 RX ADMIN — LISINOPRIL 5 MG: 5 TABLET ORAL at 21:13

## 2020-06-24 RX ADMIN — CEFEPIME HYDROCHLORIDE 2 G: 2 INJECTION, POWDER, FOR SOLUTION INTRAVENOUS at 19:36

## 2020-06-24 RX ADMIN — POTASSIUM CHLORIDE 10 MEQ: 10 INJECTION, SOLUTION INTRAVENOUS at 17:35

## 2020-06-24 RX ADMIN — ACETAMINOPHEN 1000 MG: 500 TABLET, FILM COATED ORAL at 14:38

## 2020-06-24 RX ADMIN — POTASSIUM CHLORIDE 40 MEQ: 20 TABLET, EXTENDED RELEASE ORAL at 17:35

## 2020-06-24 RX ADMIN — ACETAMINOPHEN 650 MG: 325 TABLET, FILM COATED ORAL at 21:13

## 2020-06-24 ASSESSMENT — PAIN SCALES - GENERAL
PAINLEVEL_OUTOF10: 0

## 2020-06-24 NOTE — ED PROVIDER NOTES
HPI:  6/24/20, Time: 6:08 PM EDT         Mabel Epley is a 81 y.o. male presenting to the ED for fever, chills, body aches and dysuria for 1 week. Had TURP on 6/5/20. Was seen in office a few days after his procedure for urinary retention and had carney placed, had voiding trial a couple days later and failed, carney placed again. Now with carney still in place and on Cipro for UTI and not feeling better. The complaint has been persistent, moderate in severity, and worsened by nothing. Patient denies fever/chills, cough, congestion, chest pain, shortness of breath, headache, visual disturbances, focal paresthesias, focal weakness, abdominal pain, nausea, vomiting, diarrhea, constipation, hematuria, trauma, neck or back pain or other complaints. ROS:   Pertinent positives and negatives are stated within HPI, all other systems reviewed and are negative.      --------------------------------------------- PAST HISTORY ---------------------------------------------  Past Medical History:  has a past medical history of Arthritis, Asthma, Diabetes mellitus ([facility] [address] Hypertension, and Sleep apnea. Past Surgical History:  has a past surgical history that includes Cholecystectomy (1995); Tonsillectomy; TURP (08/04/2017); and TURP (N/A, 6/5/2020). Social History:  reports that he has never smoked. He has never used smokeless tobacco. He reports that he does not drink alcohol or use drugs. Family History: family history includes Cancer in his brother and mother; Heart Disease in his father. The patients home medications have been reviewed.     Allergies: Penicillins        ---------------------------------------------------PHYSICAL EXAM--------------------------------------    Constitutional:  Well developed, well nourished, obese, no acute distress, non-toxic appearance   Eyes:  PERRL, conjunctiva normal, EOMI  HENT:  Atraumatic, external ears normal, nose normal, oropharynx moist. Neck- normal range of motion, no nuchal rigidity   Respiratory:  No respiratory distress, normal breath sounds, no rales, no wheezing   Cardiovascular:  Tachycardic rate, normal rhythm, no murmurs, no gallops, no rubs. Radial and DP pulses 2+ bilaterally. GI:  Soft, nondistended, normal bowel sounds, nontender, no organomegaly, no mass, no rebound, no guarding   :  No costovertebral angle tenderness. Indwelling carney catheter in place with dark yellow urine with sediment draining in bag   Musculoskeletal:  3+ bilateral lower extremity pitting edema, no tenderness, no deformities. Back- no tenderness  Integument:  Well hydrated, no rash. Adequate perfusion. Lymphatic:  No cervical lymphadenopathy noted   Neurologic:  Alert & oriented x 3, CN 2-12 normal, no focal deficits noted. Psychiatric:  Speech and behavior appropriate       -------------------------------------------------- RESULTS -------------------------------------------------  I have personally reviewed all laboratory and imaging results for this patient. Results are listed below.      LABS:  Results for orders placed or performed during the hospital encounter of 06/24/20   Lactate, Sepsis   Result Value Ref Range    Lactic Acid, Sepsis 1.5 0.5 - 1.9 mmol/L   CBC auto differential   Result Value Ref Range    WBC 7.9 4.5 - 11.5 E9/L    RBC 4.19 3.80 - 5.80 E12/L    Hemoglobin 12.2 (L) 12.5 - 16.5 g/dL    Hematocrit 35.7 (L) 37.0 - 54.0 %    MCV 85.2 80.0 - 99.9 fL    MCH 29.1 26.0 - 35.0 pg    MCHC 34.2 32.0 - 34.5 %    RDW 13.4 11.5 - 15.0 fL    Platelets 824 951 - 473 E9/L    MPV 9.5 7.0 - 12.0 fL    Neutrophils % 85.8 (H) 43.0 - 80.0 %    Immature Granulocytes % 0.6 0.0 - 5.0 %    Lymphocytes % 7.9 (L) 20.0 - 42.0 %    Monocytes % 5.3 2.0 - 12.0 %    Eosinophils % 0.0 0.0 - 6.0 %    Basophils % 0.4 0.0 - 2.0 %    Neutrophils Absolute 6.77 1.80 - 7.30 E9/L    Immature Granulocytes # 0.05 E9/L    Lymphocytes Absolute 0.62 (L) 1.50 - 4.00 E9/L    Monocytes Absolute 0.42 0.10 - XR CHEST PORTABLE   Final Result   No evidence of acute process. Cardiomegaly. EKG Interpretation  Interpreted by emergency department physician,    Time: 3150  Rhythm: normal sinus   Rate: 90  Axis: left  Conduction: right bundle branch block (complete)  ST Segments: no acute change  T Waves: no acute change  Clinical Impression: no acute changes  Comparison to prior EKG: None      ------------------------- NURSING NOTES AND VITALS REVIEWED ---------------------------   The nursing notes within the ED encounter and vital signs as below have been reviewed by myself. BP (!) 152/82   Pulse 104   Temp 100.6 °F (38.1 °C) (Oral)   Resp 14   Ht 6' 2\" (1.88 m)   Wt (!) 340 lb (154.2 kg)   SpO2 94%   BMI 43.65 kg/m²   Oxygen Saturation Interpretation: Normal    The patients available past medical records and past encounters were reviewed.         ------------------------------ ED COURSE/MEDICAL DECISION MAKING----------------------  Medications   sodium chloride flush 0.9 % injection 10 mL ( Intravenous Canceled Entry 6/24/20 1916)   sodium chloride flush 0.9 % injection 10 mL (has no administration in time range)   cefepime (MAXIPIME) 2 g IVPB extended (mini-bag) (2 g Intravenous New Bag 6/24/20 1936)   0.9 % sodium chloride bolus (0 mLs Intravenous Stopped 6/24/20 1540)   acetaminophen (TYLENOL) tablet 1,000 mg (1,000 mg Oral Given 6/24/20 1438)   potassium chloride (KLOR-CON M) extended release tablet 40 mEq (40 mEq Oral Given 6/24/20 1735)   potassium chloride 10 mEq/100 mL IVPB (Peripheral Line) (0 mEq Intravenous Stopped 6/24/20 1916)           Procedures:  none      Medical Decision Making:    SEPSIS EVALUATION TOOL Time of diagnosis: 6/24/20 17:42    SIRS CRITERIA: (2 required)  Temperature <36 or >38  No  Heart rate >90    Yes  RR >20 or PCO2 <32   Yes  WBC >12 or <4 or >10% bands No    SEPSIS CRITERIA: (Both required)  Two or more of the above  Yes  Suspected source(s) of infection UTI    ANTIBIOTIC SELECTION RATIONAL  Antibiogram    ANTIBIOTIC SELECTION LIMITATIONS  Allergy and Documented failure    LACTIC ACID    Initial     1.5  Follow up - if initial abnormal (>2) NA    SEVERE SEPSIS CRITERIA: (All 3 of the following criteria needed)  1. SIRS Criteria met   Yes  2. Sepsis Criteria met   Yes  3. Organ dysfunction as evidenced by any one of the following No   A. Systolic HW<44 or MAP< 65 or SBP decrease by >40 from baseline   B. Creatinine >2.0, or urine output <0.5 mL/kg/hr for 2 hours   C. Bilirubin > 2 mg/dL   D. Platelet count < 131,634   E. INR > 1.5 or aPTT > 60 sec   F. Lactate > 2 mmol/L    Section requirements: To be done within 3 hours of presentation (1320 + 3 hours):  1. Initial lactate measured  2. Broad spectrum antibiotics administered  3. Blood cultures drawn prior to antibiotics  4. Repeat lactate if initial level >2 (6 hour requirement)    SEPTIC SHOCK CRITERIA: (Both of the following must be met)  1. Severe sepsis criteria met   No   AND either of the followin. Lactate > 4 mmol/ L on any reading No    OR      Tissue hypoperfusion after crystalloids as evidenced by either: No   A. SBP <90 or MAP <65                        Or      This patient's ED course included: re-evaluation prior to disposition, IV medications, cardiac monitoring, continuous pulse oximetry and a personal history and physicial eaxmination    This patient has remained hemodynamically stable, improved and been closely monitored during their ED course. Re-Evaluations:             Time: 6:25 PM EDT  Re-evaluation. Patients symptoms are improving  Repeat physical examination is improved        Consultations:             6:50 PM EDT  Spoke with Dr Kalli Alamo, discussed case, accepts admission  Spoke with Dr Ada Funes, discussed case.      Critical Care: Please note that the withdrawal or failure to initiate urgent interventions for this patient would likely result in a life threatening deterioration or permanent disability. Accordingly this patient received 30 minutes of critical care time, excluding separately billable procedures. Counseling: The emergency provider has spoken with the patient and spouse/SO and discussed todays results, in addition to providing specific details for the plan of care and counseling regarding the diagnosis and prognosis. Questions are answered at this time and they are agreeable with the plan.       --------------------------------- IMPRESSION AND DISPOSITION ---------------------------------    IMPRESSION  1. Septicemia ([facility] [address]    2. Urinary tract infection associated with indwelling urethral catheter[facility]    3.  Hypokalemia        DISPOSITION  Disposition: Admit to telemetry  Patient condition is stable                  Jorge Hammond DO  06/24/20 1953

## 2020-06-25 LAB
EKG ATRIAL RATE: 90 BPM
EKG ATRIAL RATE: 96 BPM
EKG P AXIS: 40 DEGREES
EKG P AXIS: 54 DEGREES
EKG P-R INTERVAL: 190 MS
EKG P-R INTERVAL: 192 MS
EKG Q-T INTERVAL: 374 MS
EKG Q-T INTERVAL: 404 MS
EKG QRS DURATION: 124 MS
EKG QRS DURATION: 132 MS
EKG QTC CALCULATION (BAZETT): 472 MS
EKG QTC CALCULATION (BAZETT): 494 MS
EKG R AXIS: -64 DEGREES
EKG R AXIS: -65 DEGREES
EKG T AXIS: 15 DEGREES
EKG T AXIS: 7 DEGREES
EKG VENTRICULAR RATE: 90 BPM
EKG VENTRICULAR RATE: 96 BPM

## 2020-06-25 PROCEDURE — 93010 ELECTROCARDIOGRAM REPORT: CPT | Performed by: INTERNAL MEDICINE

## 2020-06-25 PROCEDURE — 6360000002 HC RX W HCPCS: Performed by: INTERNAL MEDICINE

## 2020-06-25 PROCEDURE — 6370000000 HC RX 637 (ALT 250 FOR IP): Performed by: INTERNAL MEDICINE

## 2020-06-25 PROCEDURE — 2500000003 HC RX 250 WO HCPCS: Performed by: INTERNAL MEDICINE

## 2020-06-25 PROCEDURE — 93005 ELECTROCARDIOGRAM TRACING: CPT | Performed by: INTERNAL MEDICINE

## 2020-06-25 PROCEDURE — 2580000003 HC RX 258: Performed by: INTERNAL MEDICINE

## 2020-06-25 PROCEDURE — 94660 CPAP INITIATION&MGMT: CPT

## 2020-06-25 PROCEDURE — 2060000000 HC ICU INTERMEDIATE R&B

## 2020-06-25 PROCEDURE — 99222 1ST HOSP IP/OBS MODERATE 55: CPT | Performed by: INTERNAL MEDICINE

## 2020-06-25 RX ORDER — PROCHLORPERAZINE EDISYLATE 5 MG/ML
10 INJECTION INTRAMUSCULAR; INTRAVENOUS EVERY 6 HOURS PRN
Status: DISCONTINUED | OUTPATIENT
Start: 2020-06-25 | End: 2020-06-29 | Stop reason: HOSPADM

## 2020-06-25 RX ORDER — METOPROLOL TARTRATE 5 MG/5ML
5 INJECTION INTRAVENOUS ONCE
Status: COMPLETED | OUTPATIENT
Start: 2020-06-25 | End: 2020-06-25

## 2020-06-25 RX ORDER — ALBUTEROL SULFATE 90 UG/1
2 AEROSOL, METERED RESPIRATORY (INHALATION) EVERY 4 HOURS PRN
COMMUNITY

## 2020-06-25 RX ADMIN — DOXAZOSIN 1 MG: 1 TABLET ORAL at 00:24

## 2020-06-25 RX ADMIN — ACETAMINOPHEN 650 MG: 325 TABLET, FILM COATED ORAL at 20:03

## 2020-06-25 RX ADMIN — SODIUM CHLORIDE, PRESERVATIVE FREE 10 ML: 5 INJECTION INTRAVENOUS at 08:55

## 2020-06-25 RX ADMIN — TAMSULOSIN HYDROCHLORIDE 0.4 MG: 0.4 CAPSULE ORAL at 16:25

## 2020-06-25 RX ADMIN — BETHANECHOL CHLORIDE 25 MG: 25 TABLET ORAL at 14:11

## 2020-06-25 RX ADMIN — ENOXAPARIN SODIUM 150 MG: 150 INJECTION SUBCUTANEOUS at 20:04

## 2020-06-25 RX ADMIN — METOPROLOL TARTRATE 25 MG: 25 TABLET, FILM COATED ORAL at 10:33

## 2020-06-25 RX ADMIN — LISINOPRIL 5 MG: 5 TABLET ORAL at 08:56

## 2020-06-25 RX ADMIN — CEFEPIME 1 G: 1 INJECTION, POWDER, FOR SOLUTION INTRAMUSCULAR; INTRAVENOUS at 08:53

## 2020-06-25 RX ADMIN — DIPHENHYDRAMINE HCL 50 MG: 25 TABLET ORAL at 00:25

## 2020-06-25 RX ADMIN — DOXAZOSIN 1 MG: 1 TABLET ORAL at 20:03

## 2020-06-25 RX ADMIN — TAMSULOSIN HYDROCHLORIDE 0.4 MG: 0.4 CAPSULE ORAL at 08:50

## 2020-06-25 RX ADMIN — CEFEPIME 1 G: 1 INJECTION, POWDER, FOR SOLUTION INTRAMUSCULAR; INTRAVENOUS at 20:04

## 2020-06-25 RX ADMIN — ENOXAPARIN SODIUM 40 MG: 40 INJECTION SUBCUTANEOUS at 08:50

## 2020-06-25 RX ADMIN — BETHANECHOL CHLORIDE 25 MG: 25 TABLET ORAL at 20:04

## 2020-06-25 RX ADMIN — ACETAMINOPHEN 650 MG: 325 TABLET, FILM COATED ORAL at 08:53

## 2020-06-25 RX ADMIN — BETHANECHOL CHLORIDE 25 MG: 25 TABLET ORAL at 08:49

## 2020-06-25 RX ADMIN — SODIUM CHLORIDE, PRESERVATIVE FREE 10 ML: 5 INJECTION INTRAVENOUS at 20:05

## 2020-06-25 RX ADMIN — ENOXAPARIN SODIUM 110 MG: 120 INJECTION SUBCUTANEOUS at 11:19

## 2020-06-25 RX ADMIN — METOROPROLOL TARTRATE 5 MG: 5 INJECTION, SOLUTION INTRAVENOUS at 06:17

## 2020-06-25 RX ADMIN — SODIUM CHLORIDE: 9 INJECTION, SOLUTION INTRAVENOUS at 12:20

## 2020-06-25 RX ADMIN — BETHANECHOL CHLORIDE 25 MG: 25 TABLET ORAL at 00:25

## 2020-06-25 ASSESSMENT — PAIN DESCRIPTION - FREQUENCY: FREQUENCY: INTERMITTENT

## 2020-06-25 ASSESSMENT — PAIN SCALES - GENERAL
PAINLEVEL_OUTOF10: 0
PAINLEVEL_OUTOF10: 3
PAINLEVEL_OUTOF10: 0
PAINLEVEL_OUTOF10: 0

## 2020-06-25 ASSESSMENT — PAIN DESCRIPTION - LOCATION: LOCATION: HEAD

## 2020-06-25 ASSESSMENT — PAIN DESCRIPTION - PAIN TYPE: TYPE: ACUTE PAIN

## 2020-06-25 ASSESSMENT — PAIN DESCRIPTION - DESCRIPTORS: DESCRIPTORS: HEADACHE;DULL;DISCOMFORT

## 2020-06-25 ASSESSMENT — PAIN DESCRIPTION - ONSET: ONSET: GRADUAL

## 2020-06-25 ASSESSMENT — PAIN - FUNCTIONAL ASSESSMENT: PAIN_FUNCTIONAL_ASSESSMENT: ACTIVITIES ARE NOT PREVENTED

## 2020-06-25 NOTE — PLAN OF CARE
Problem: Airway Clearance - Ineffective:  Goal: Ability to maintain a clear airway will improve  Description: Ability to maintain a clear airway will improve  Outcome: Met This Shift     Problem: Bowel Function - Altered:  Goal: Bowel elimination is within specified parameters  Description: Bowel elimination is within specified parameters  Outcome: Met This Shift     Problem:  Activity:  Goal: Risk for activity intolerance will decrease  Description: Risk for activity intolerance will decrease  Outcome: Met This Shift     Problem: Nutritional:  Goal: Maintenance of adequate nutrition will improve  Description: Maintenance of adequate nutrition will improve  Outcome: Met This Shift     Problem: Gas Exchange - Impaired:  Goal: Levels of oxygenation will improve  Description: Levels of oxygenation will improve  Outcome: Met This Shift     Problem: Mood - Altered:  Goal: Emotional status will stabilize  Description: Emotional status will stabilize  Outcome: Met This Shift

## 2020-06-25 NOTE — PROGRESS NOTES
This RN notified Dr. Freedman Means that patient's heart rhythm in atrial flutter with heart rate sustaining in the 120's. New order received to administer Metoprolol 5mg IV x1.

## 2020-06-25 NOTE — PROGRESS NOTES
Spoke to Dr Sudha Jacques asked if he wanted a COVID test, he stated no. Pt diagnosis is COVID requested change with charge RN due to Dr Sudha Jacques stating pt not here for that.

## 2020-06-25 NOTE — PROGRESS NOTES
Progress Note    Subjective:Patient feeling much better no fever no chills , antibiotics helping  Patient developed atrial fibrillation with RVR, cardiology consulted    ROS: denies fever, chills, cp, sob, n/v, HA unless stated above.  enoxaparin  1 mg/kg Subcutaneous BID    sodium chloride flush  10 mL Intravenous 2 times per day    vitamin C  500 mg Oral Daily    bethanechol  25 mg Oral TID    therapeutic multivitamin-minerals  1 tablet Oral Daily    oxymetazoline  2 spray Nasal BID    tamsulosin  0.4 mg Oral BID    lisinopril  5 mg Oral Daily    doxazosin  1 mg Oral Nightly    cefepime  1 g Intravenous Q12H     prochlorperazine, 10 mg, Q6H PRN  sodium chloride flush, 10 mL, PRN  acetaminophen, 650 mg, Q6H PRN    Or  acetaminophen, 650 mg, Q6H PRN  diphenhydrAMINE, 50 mg, Q6H PRN  sodium chloride, 1 spray, PRN         Objective: Intake/Output Summary (Last 24 hours) at 6/25/2020 1414  Last data filed at 6/25/2020 1404  Gross per 24 hour   Intake 690 ml   Output 2475 ml   Net -1785 ml       BP (!) 145/78   Pulse 81   Temp 99.3 °F (37.4 °C) (Oral)   Resp 18   Ht 6' 2\" (1.88 m)   Wt (!) 340 lb 3.2 oz (154.3 kg)   SpO2 95%   BMI 43.68 kg/m²     Constitutional:  Well developed, well nourished, obese, no acute distress, non-toxic appearance   Eyes:  PERRL, conjunctiva normal, EOMI  HENT:  Atraumatic, external ears normal, nose normal, oropharynx moist. Neck- normal range of motion, no nuchal rigidity   Respiratory:  No respiratory distress, normal breath sounds, no rales, no wheezing   Cardiovascular:  Tachycardic rate, normal rhythm, no murmurs, no gallops, no rubs. Radial and DP pulses 2+ bilaterally. GI:  Soft, nondistended, normal bowel sounds, nontender, no organomegaly, no mass, no rebound, no guarding   :  No costovertebral angle tenderness.  Indwelling carney catheter in place with dark yellow urine with sediment draining in bag   Musculoskeletal:  3+ bilateral lower extremity pitting edema, no tenderness, no deformities. Back- no tenderness  Integument:  Well hydrated, no rash. Adequate perfusion. Lymphatic:  No cervical lymphadenopathy noted   Neurologic:  Alert & oriented x 3, CN 2-12 normal, no focal deficits noted.     Psychiatric:  Speech and behavior appropriate     Recent Labs     06/24/20  1419      K 2.9*   CL 90*   CO2 28   BUN 17   CREATININE 1.2   GLUCOSE 217*   CALCIUM 8.5*       Recent Labs     06/24/20  1419   WBC 7.9   RBC 4.19   HGB 12.2*   HCT 35.7*   MCV 85.2   MCH 29.1   MCHC 34.2   RDW 13.4      MPV 9.5           Radiology: Xr Chest Portable    Result Date: 6/24/2020  EXAMINATION: ONE XRAY VIEW OF THE CHEST 6/24/2020 1:48 pm COMPARISON: None. HISTORY: ORDERING SYSTEM PROVIDED HISTORY: fever TECHNOLOGIST PROVIDED HISTORY: Reason for exam:->fever FINDINGS: Cardiomegaly. Normal pulmonary vasculature. No focal consolidation, pleural effusion, pneumothorax. No evidence of acute process. Cardiomegaly. Xr Urogram W Wo Kub W Wo Tomogram    Result Date: 6/5/2020  EXAMINATION: SPOT FLUOROSCOPIC IMAGES 6/5/2020 1:19 pm COMPARISON: Abdomen and pelvis CT 05/15/2020 HISTORY: ORDERING SYSTEM PROVIDED HISTORY: Benign prostatic hyperplasia with lower urinary tract symptoms, symptom details unspecified TECHNOLOGIST PROVIDED HISTORY: Reason for exam:->cysto,retrogrades,TURP Intraprocedural imaging. TECHNIQUE: Fluoroscopy was provided by the radiology department for procedure. Radiologist was not present during examination. FLUOROSCOPY DOSE AND TYPE OR TIME AND EXPOSURES: Fluoroscopy time 30.5 seconds, DAP 1.06 mGy*m2 FINDINGS: 11 spot images of the abdomen and pelvis were obtained. Cannulation of the right ureter via cystoscopy with instillation of contrast. No right hydroureteronephrosis. No evident urothelial abnormalities nor strictures in the right ureter. Intraprocedural fluoroscopic spot images as above.   See separate procedure note for more information. Us Dup Lower Extremities Bilateral Venous    Result Date: 6/24/2020  EXAMINATION: DUPLEX VENOUS ULTRASOUND OF THE BILATERAL LOWER EXTREMITIES, 6/24/2020 6:47 pm TECHNIQUE: Duplex ultrasound and Doppler images were obtained of the bilateral lower extremities COMPARISON: None. HISTORY: ORDERING SYSTEM PROVIDED HISTORY: edema TECHNOLOGIST PROVIDED HISTORY: Reason for exam:->edema FINDINGS: The visualized veins of the bilateral lower extremities are patent and free of echogenic thrombus. The veins are normally compressible and have normal phasic flow. No evidence of DVT in either lower extremity. Assessment:    Patient Active Problem List   Diagnosis Code    COVID-19 virus infection U07.1    Sepsis ([address] A41.9     Sepsis ([address] secondary to urinary procedure  Urinary tract infection  Hypokalemia  Hypocalcemia  Urinary retention  Morbid obesity  BMI >43  Atrial fibrillation with RVR      PLAN:  1. Admit to telemetry  2. Sepsis Protocol  3. Continue home medications  4. Will start broad spectrum  IV antibiotics to cover  bugs  5. Rest of medications as per order sheet  6. Consult Dr. Navjot Gutierres  7.  Cardiology consulted     Code Status: Full code  DVT prophylaxis: Lovenox 40 mg once a day as directed        Maziniss Saliva

## 2020-06-25 NOTE — CONSULTS
[facility]        CARDIOLOGY CONSULT             PATIENT SEEN IN CONSULT FOR: Atrial Fibrillation    Hospital Day: 2     Kelle Zee is a 81 year old patient not known to [place] cardiology. History of Present Illness: The patient is a 81 y.o. male patient with history of HNT, Diabetes, PANCHO, obesity presented with fever, chills, dysuria, and body aches for past week. He went in to A fib with RVR and cardiology consulted for further recommendations. He denies CP or SOB, No pND or orthopnea, No N/V/D; No palpitations.  No cough or hemoptysis    ROS: Review of rest of 10 systems negative except as mentioned above    Past Medical History   Past Medical History:   Diagnosis Date    Arthritis       lower back    Asthma      Diabetes mellitus ([address]       diet controlled    Hypertension      Sleep apnea              Past Surgical History         Past Surgical History:   Procedure Laterality Date    CHOLECYSTECTOMY   1995    TONSILLECTOMY        TURP   08/04/2017     cr            Medications Prior to Admission:      Prescriptions Prior to Admission   Medications Prior to Admission: bethanechol (URECHOLINE) 25 MG tablet, Take 25 mg by mouth 3 times daily  sodium chloride (OCEAN, BABY AYR) 0.65 % nasal spray, 1 spray by Nasal route as needed for Congestion  oxymetazoline (AFRIN) 0.05 % nasal spray, 2 sprays by Nasal route 2 times daily  tamsulosin (FLOMAX) 0.4 MG capsule, Take 0.4 mg by mouth 2 times daily   terazosin (HYTRIN) 1 MG capsule, Take 1 mg by mouth nightly  Lisinopril-Hydrochlorothiazide (PRINZIDE PO), Take 50 mg by mouth Indications: dose is 50/25mg Dose 20/12.5 mg  diphenhydrAMINE (BENADRYL) 25 MG tablet, Take 50 mg by mouth every 6 hours as needed for Itching  Multiple Vitamins-Minerals (CENTRUM/CERTA-ANABEL WITH MINERALS ORAL) solution, Take 15 mLs by mouth daily  Multiple Vitamin (THERAPEUTIC) TABS tablet, Take 1 tablet by mouth daily  Ascorbic Acid (VITAMIN C) 500 MG tablet, Take 500 mg by mouth daily  Misc Natural Products (GLUCOSAMINE CHOND COMPLEX/MSM PO), Take 1 tablet by mouth        Allergies:    Penicillins     Social History:    reports that he has never smoked. He has never used smokeless tobacco. He reports that he does not drink alcohol or use drugs.     Family History:   Non-contributory to this urological problem  family history includes Cancer in his brother and mother; Heart Disease in his father. Diagnostics:       Telemetry: NSR, A Fib with RVR. 12 lead EKG: Reviewed          Intake/Output Summary (Last 24 hours) at 6/25/2020 1037  Last data filed at 6/25/2020 1022  Gross per 24 hour   Intake --   Output 1875 ml   Net -1875 ml       Labs:   CBC:   Recent Labs     06/24/20  1419   WBC 7.9   HGB 12.2*   HCT 35.7*        BMP:   Recent Labs     06/24/20  1419      K 2.9*   CO2 28   BUN 17   CREATININE 1.2   LABGLOM >60   CALCIUM 8.5*     Mag: No results for input(s): MG in the last 72 hours. Phos: No results for input(s): PHOS in the last 72 hours. TSH: No results for input(s): TSH in the last 72 hours. HgA1c:     BNP: No results for input(s): BNP in the last 72 hours. PT/INR:   Recent Labs     06/24/20  1419   PROTIME 15.1*   INR 1.3     APTT:  Recent Labs     06/24/20  1419   APTT 25.9     CARDIAC ENZYMES:No results for input(s): CKTOTAL, CKMB, CKMBINDEX, TROPONINI in the last 72 hours.   FASTING LIPID PANEL:No results found for: CHOL, HDL, LDLDIRECT, LDLCALC, TRIG  LIVER PROFILE:  Recent Labs     06/24/20  1419   AST 24   ALT 29   LABALBU 3.3*       Current Inpatient Medications:   enoxaparin  1 mg/kg Subcutaneous BID    enoxaparin  110 mg Subcutaneous Once    sodium chloride flush  10 mL Intravenous 2 times per day    vitamin C  500 mg Oral Daily    bethanechol  25 mg Oral TID    therapeutic multivitamin-minerals  1 tablet Oral Daily    oxymetazoline  2 spray Nasal BID    tamsulosin  0.4 mg Oral BID    lisinopril  5 mg Oral Daily    doxazosin  1 mg Oral Nightly    cefepime  1 g Intravenous Q12H       IV Infusions (if any):   sodium chloride           PHYSICAL EXAM:     CONSTITUTIONAL:   BP (!) 158/81   Pulse 108   Temp 101.2 °F (38.4 °C) (Oral)   Resp 18   Ht 6' 2\" (1.88 m)   Wt (!) 340 lb 3.2 oz (154.3 kg)   SpO2 92%   BMI 43.68 kg/m²   Pulse  Av.2  Min: 89  Max: 426  Systolic (69OTI), BPT:418 , Min:118 , VVO:430    Diastolic (15KZN), CBD:34, Min:62, Max:90    In general, this is a well developed, well nourished who appears stated age. awake, alert, cooperative, no apparent distress  HEENT: eyes -conjunctivae pink,  Throat - Oral mucosa pink and moist.   Neck-  no stridor, no noted enlargement of the thyroid, no carotid bruit. no jugular venous distention   RESPIRATORY: Chest symmetrical and non-tender to palpation. No accessory muscle use. Lung auscultation - clear to auscultation except few rhonchi  CARDIOVASCULAR:     Heart Inspection shows no noted pulsations  Heart Palpation - no palpable thrills    Heart Ausculation - Regular rate and rhythm, 1/6 systolic murmur, No s3 or rub. No lower extremity edema, no varicosities. Distal pulses palpable, no clubbing or cyanosis   ABDOMEN: Soft, nontender, Bowel sounds present. No Palpable masses   MS: good muscle strength and tone. : Deferred  Rectal Exam: Deferred  SKIN: warm and dry no statis dermatitis or ulcers   NEURO / PSYCH: oriented to person, place        ASSESSMENT/PLAN:     Atrial Fibrillation with RVR - New diagnosis - [id number] VASc score 3; Rate control with Metoprolol, Lovenox anticoagulation for now - Check Echo to r/o thrombus, check LA size, LV and Valvular function - Later discuss [address]; Lexiscan stress tomorrow, to r/o ischemiaif stable.     UTI/urinary retention - Urology on Case     Sepsis ([facility]    Hypokalemia - Supplement Kcl    Essential HTN - On Lisinopril     Diabetes    Morbid obesity          Above recommendations discussed with him.        Electronically signed by Migdalia Valdes MD on 6/25/2020   [facility], [place] ([facility]) Cardiology

## 2020-06-25 NOTE — H&P
History and Physical      CHIEF COMPLAINT:  Fever, chills, body aches    History of Present Illness: This is a 81year old male patient that was brought in with reports of fever, chills, dysuria, and body aches for past week. He was recently had a TURP done on 06/05/2020. Status post procedure he developed urinary retention and had a carney placed. He failed voiding trial and had to have carney placed again. He has been getting treated for a urinary tract infection. Today, he states he is feeling worse. He has a positive fever and chills. The laboratory studies today show a low potassium and calcium level. His urine is positive for infection and blood. At this time he is being admitted for evaluation and treatment. REVIEW OF SYSTEMS:  Constitutional: positive for chills and fevers  Eyes: negative  Ears, nose, mouth, throat, and face: negative  Respiratory: negative  Cardiovascular: negative  Gastrointestinal: negative  Genitourinary:positive for dysuria  Integument/breast: negative  Hematologic/lymphatic: negative  Musculoskeletal:negative  Neurological: negative  Behavioral/Psych: negative  Endocrine: negative  Allergic/Immunologic: negative    PMH:  Past Medical History:   Diagnosis Date    Arthritis     lower back    Asthma     Diabetes mellitus ([facility] [address]     diet controlled    Hypertension     Sleep apnea        Surgical History:  Past Surgical History:   Procedure Laterality Date    CHOLECYSTECTOMY  1995    TONSILLECTOMY      TURP  08/04/2017    cr    TURP N/A 6/5/2020    CYSTOSCOPY, RETROGRADE, PYELOGRAM. CYSTOGRAM. TRANSURETHRAL RESECTION PROSTATE performed by Deirdre Lamas Enrrique, DO at [address]       Medications Prior to Admission:    Prior to Admission medications    Medication Sig Start Date End Date Taking?  Authorizing Provider   albuterol sulfate HFA (VENTOLIN HFA) 108 (90 Base) MCG/ACT inhaler Inhale 2 puffs into the lungs every 4 hours as needed for Wheezing or Shortness of Breath   Yes Historical Provider, MD   ciprofloxacin (CIPRO) 500 MG tablet Take 500 mg by mouth 2 times daily 6/22/20  Yes Historical Provider, MD   bethanechol (URECHOLINE) 25 MG tablet Take 25 mg by mouth 3 times daily    Historical Provider, MD   Multiple Vitamins-Minerals (CENTRUM/CERTA-ANABEL WITH MINERALS ORAL) solution Take 15 mLs by mouth daily    Historical Provider, MD   sodium chloride (OCEAN, BABY AYR) 0.65 % nasal spray 1 spray by Nasal route as needed for Congestion    Historical Provider, MD   oxymetazoline (AFRIN) 0.05 % nasal spray 2 sprays by Nasal route 2 times daily    Historical Provider, MD   tamsulosin (FLOMAX) 0.4 MG capsule Take 0.4 mg by mouth 2 times daily     Historical Provider, MD   terazosin (HYTRIN) 1 MG capsule Take 1 mg by mouth nightly    Historical Provider, MD   Lisinopril-Hydrochlorothiazide (PRINZIDE PO) Take 50 mg by mouth daily Indications: dose is 50/25mg Dose 20/12.5 mg    Historical Provider, MD   diphenhydrAMINE (BENADRYL) 25 MG tablet Take 50 mg by mouth every 6 hours as needed for Itching    Historical Provider, MD   Multiple Vitamin (THERAPEUTIC) TABS tablet Take 1 tablet by mouth daily    Historical Provider, MD   Ascorbic Acid (VITAMIN C) 500 MG tablet Take 500 mg by mouth daily    Historical Provider, MD       Allergies:    Penicillins    Social History:    reports that he has never smoked. He has never used smokeless tobacco. He reports that he does not drink alcohol or use drugs. Family History:   family history includes Cancer in his brother and mother; Heart Disease in his father.       PHYSICAL EXAM:  Vitals:  BP (!) 155/74   Pulse 95   Temp 101.3 °F (38.5 °C) (Oral)   Resp 18   Ht 6' 2\" (1.88 m)   Wt (!) 340 lb 3.2 oz (154.3 kg)   SpO2 92%   BMI 43.68 kg/m²     Constitutional:  Well developed, well nourished, obese, no acute distress, non-toxic appearance   Eyes:  PERRL, conjunctiva normal, EOMI  HENT:  Atraumatic, external ears normal, nose normal, oropharynx moist. Neck- normal range of motion, no nuchal rigidity   Respiratory:  No respiratory distress, normal breath sounds, no rales, no wheezing   Cardiovascular:  Tachycardic rate, normal rhythm, no murmurs, no gallops, no rubs. Radial and DP pulses 2+ bilaterally. GI:  Soft, nondistended, normal bowel sounds, nontender, no organomegaly, no mass, no rebound, no guarding   :  No costovertebral angle tenderness. Indwelling carney catheter in place with dark yellow urine with sediment draining in bag   Musculoskeletal:  3+ bilateral lower extremity pitting edema, no tenderness, no deformities. Back- no tenderness  Integument:  Well hydrated, no rash. Adequate perfusion. Lymphatic:  No cervical lymphadenopathy noted   Neurologic:  Alert & oriented x 3, CN 2-12 normal, no focal deficits noted.      Psychiatric:  Speech and behavior appropriate       LABS:  Lactate, Sepsis   Result Value Ref Range     Lactic Acid, Sepsis 1.5 0.5 - 1.9 mmol/L   CBC auto differential   Result Value Ref Range     WBC 7.9 4.5 - 11.5 E9/L     RBC 4.19 3.80 - 5.80 E12/L     Hemoglobin 12.2 (L) 12.5 - 16.5 g/dL     Hematocrit 35.7 (L) 37.0 - 54.0 %     MCV 85.2 80.0 - 99.9 fL     MCH 29.1 26.0 - 35.0 pg     MCHC 34.2 32.0 - 34.5 %     RDW 13.4 11.5 - 15.0 fL     Platelets 333 245 - 725 E9/L     MPV 9.5 7.0 - 12.0 fL     Neutrophils % 85.8 (H) 43.0 - 80.0 %     Immature Granulocytes % 0.6 0.0 - 5.0 %     Lymphocytes % 7.9 (L) 20.0 - 42.0 %     Monocytes % 5.3 2.0 - 12.0 %     Eosinophils % 0.0 0.0 - 6.0 %     Basophils % 0.4 0.0 - 2.0 %     Neutrophils Absolute 6.77 1.80 - 7.30 E9/L     Immature Granulocytes # 0.05 E9/L     Lymphocytes Absolute 0.62 (L) 1.50 - 4.00 E9/L     Monocytes Absolute 0.42 0.10 - 0.95 E9/L     Eosinophils Absolute 0.00 (L) 0.05 - 0.50 E9/L     Basophils Absolute 0.03 0.00 - 0.20 E9/L   Comprehensive Metabolic Panel   Result Value Ref Range     Sodium 132 132 - 146 mmol/L     Potassium 2.9 (L) 3.5 - 5.0 mmol/L     Chloride 90 (L) 98 - 107 mmol/L     CO2 28 22 - 29 mmol/L     Anion Gap 14 7 - 16 mmol/L     Glucose 217 (H) 74 - 99 mg/dL     BUN 17 8 - 23 mg/dL     CREATININE 1.2 0.7 - 1.2 mg/dL     GFR Non-African American >60 >=60 mL/min/1.73     GFR African American >60       Calcium 8.5 (L) 8.6 - 10.2 mg/dL     Total Protein 6.4 6.4 - 8.3 g/dL     Alb 3.3 (L) 3.5 - 5.2 g/dL     Total Bilirubin 0.5 0.0 - 1.2 mg/dL     Alkaline Phosphatase 61 40 - 129 U/L     ALT 29 0 - 40 U/L     AST 24 0 - 39 U/L   Urinalysis with Microscopic   Result Value Ref Range     Color, UA Yellow Straw/Yellow     Clarity, UA CLOUDY (A) Clear     Glucose, Ur Negative Negative mg/dL     Bilirubin Urine Negative Negative     Ketones, Urine Negative Negative mg/dL     Specific Gravity, UA 1.020 1.005 - 1.030     Blood, Urine LARGE (A) Negative     pH, UA 6.0 5.0 - 9.0     Protein, UA 30 (A) Negative mg/dL     Urobilinogen, Urine 0.2 <2.0 E.U./dL     Nitrite, Urine POSITIVE (A) Negative     Leukocyte Esterase, Urine MODERATE (A) Negative     WBC, UA PACKED (A) 0 - 5 /HPF     RBC, UA 5-10 (A) 0 - 2 /HPF     Bacteria, UA MANY (A) None Seen /HPF   APTT   Result Value Ref Range     aPTT 25.9 24.5 - 35.1 sec   Protime-INR   Result Value Ref Range     Protime 15.1 (H) 9.3 - 12.4 sec     INR 1.3     Lipase   Result Value Ref Range     Lipase 12 (L) 13 - 60 U/L       Radiology: Xr Chest Portable    Result Date: 6/24/2020  EXAMINATION: ONE XRAY VIEW OF THE CHEST 6/24/2020 1:48 pm COMPARISON: None. HISTORY: ORDERING SYSTEM PROVIDED HISTORY: fever TECHNOLOGIST PROVIDED HISTORY: Reason for exam:->fever FINDINGS: Cardiomegaly. Normal pulmonary vasculature. No focal consolidation, pleural effusion, pneumothorax. No evidence of acute process. Cardiomegaly.      Us Dup Lower Extremities Bilateral Venous    Result Date: 6/24/2020  EXAMINATION: DUPLEX VENOUS ULTRASOUND OF THE BILATERAL LOWER EXTREMITIES, 6/24/2020 6:47 pm TECHNIQUE: Duplex ultrasound and Doppler images were obtained of the bilateral lower extremities COMPARISON: None. HISTORY: ORDERING SYSTEM PROVIDED HISTORY: edema TECHNOLOGIST PROVIDED HISTORY: Reason for exam:->edema FINDINGS: The visualized veins of the bilateral lower extremities are patent and free of echogenic thrombus. The veins are normally compressible and have normal phasic flow. No evidence of DVT in either lower extremity. EKG:   Rhythm: normal sinus   Rate: 90  Axis: left  Conduction: right bundle branch block (complete)  ST Segments: no acute change  T Waves: no acute change  Clinical Impression: no acute changes  Comparison to prior EKG: None    ASSESSMENT:      Active Problems:  Sepsis (HCC) secondary to urinary procedure  Urinary tract infection  Hypokalemia  Hypocalcemia  Urinary retention  Morbid obesity  BMI >43    PLAN:    1. Admit to telemetry  2. Sepsis Protocol  3. Continue home medications  4. Will start broad spectrum  IV antibiotics to cover  bugs  5. Rest of medications as per order sheet  6.  Consult Dr. Duke Resides    Code Status: Full code  DVT prophylaxis: Lovenox 40 mg once a day as directed      Electronically signed by Sissy Em MD on 6/25/2020 at 9:17 AM

## 2020-06-25 NOTE — CONSULTS
6/25/2020 8:32 AM  Service: Urology  Group: [facility] ([facility])    Luis Alfredo Arevalo  [id number]     Chief Complaint:    UTI, Sepsis, Recent TURP     History of Present Illness: The patient is a 81 y.o. male patient who presented to the hospital with complaints of fever, chills, body aches, and dysuria that had begun one week prior. He was admitted for UTI and sepsis. He is known to our practice and had a TURP on 6/5/20. He presented to our office for surgery follow up and failed his voiding trial. His carney catheter was reinserted at that time and he was to follow up again on 6/29/20. He states that three days ago on Monday he was experiencing fever and chills. He called our office and we prescribed Ciprofloxacin for him. He states over the next few days he continued to have body aches, fever, chills and that's when he presented to the Emergency Department. He reports feeling a bit better today in comparison to yesterday after receiving IV antibiotics. He is still experiencing fever. His creatinine is stable 1.2. He denies any flank pain or abdominal pain.        Past Medical History:   Diagnosis Date    Arthritis     lower back    Asthma     Diabetes mellitus ([address]     diet controlled    Hypertension     Sleep apnea          Past Surgical History:   Procedure Laterality Date    CHOLECYSTECTOMY  1995    TONSILLECTOMY      TURP  08/04/2017    cr    TURP N/A 6/5/2020    CYSTOSCOPY, RETROGRADE, PYELOGRAM. CYSTOGRAM. TRANSURETHRAL RESECTION PROSTATE performed by Gibson Osuna DO at [address]       Medications Prior to Admission:    Medications Prior to Admission: albuterol sulfate HFA (VENTOLIN HFA) 108 (90 Base) MCG/ACT inhaler, Inhale 2 puffs into the lungs every 4 hours as needed for Wheezing or Shortness of Breath  ciprofloxacin (CIPRO) 500 MG tablet, Take 500 mg by mouth 2 times daily  bethanechol (URECHOLINE) 25 MG tablet, Take 25 mg by mouth 3 times daily  Multiple Vitamins-Minerals (CENTRUM/CERTA-ANABEL WITH MINERALS ORAL) solution, Take 15 mLs by mouth daily  sodium chloride (OCEAN, BABY AYR) 0.65 % nasal spray, 1 spray by Nasal route as needed for Congestion  oxymetazoline (AFRIN) 0.05 % nasal spray, 2 sprays by Nasal route 2 times daily  tamsulosin (FLOMAX) 0.4 MG capsule, Take 0.4 mg by mouth 2 times daily   terazosin (HYTRIN) 1 MG capsule, Take 1 mg by mouth nightly  Lisinopril-Hydrochlorothiazide (PRINZIDE PO), Take 50 mg by mouth daily Indications: dose is 50/25mg Dose 20/12.5 mg  diphenhydrAMINE (BENADRYL) 25 MG tablet, Take 50 mg by mouth every 6 hours as needed for Itching  Multiple Vitamin (THERAPEUTIC) TABS tablet, Take 1 tablet by mouth daily  Ascorbic Acid (VITAMIN C) 500 MG tablet, Take 500 mg by mouth daily    Allergies:    Penicillins    Social History:    reports that he has never smoked. He has never used smokeless tobacco. He reports that he does not drink alcohol or use drugs. Family History:   Non-contributory to this Urological problem  family history includes Cancer in his brother and mother; Heart Disease in his father. Review of Systems:  Constitutional: positive for fever and chills, weak  Respiratory: negative for cough and hemoptysis  Cardiovascular: negative for chest pain and dyspnea  Gastrointestinal: negative for abdominal pain, diarrhea, nausea and vomiting   Derm: negative for rash and skin lesion(s)  Neurological: negative for seizures and tremors  Musculoskeletal: Negative    Psychiatric: Negative   : As above in the HPI, otherwise negative  All other reviews are negative    Physical Exam:     Vitals:  BP (!) 155/74   Pulse 95   Temp 101.3 °F (38.5 °C) (Oral)   Resp 18   Ht 6' 2\" (1.88 m)   Wt (!) 340 lb 3.2 oz (154.3 kg)   SpO2 92%   BMI 43.68 kg/m²     General:  Awake, alert, oriented X 3. Fatigued   HEENT:  Normocephalic, atraumatic. Lungs:  Respirations symmetric and non-labored.   Abdomen:  soft, nontender, no masses  Extremities:  No clubbing, cyanosis, or edema  Skin:  Warm and dry, no open lesions or rashes  Neuro: There are no motor or sensory deficits in the 4 quadrant extremities   Rectal: deferred  Genitourinary: Carney intact draining gabriella urine     Labs:     Recent Labs     06/24/20  1419   WBC 7.9   RBC 4.19   HGB 12.2*   HCT 35.7*   MCV 85.2   MCH 29.1   MCHC 34.2   RDW 13.4      MPV 9.5         Recent Labs     06/24/20  1419   CREATININE 1.2         Assessment/plan:  S/P TURP on 6. 5.20  UTI  Sepsis    Continue the carney   Cont the Flomax   Urine culture pending  Blood cultures pending  Continue the antibiotics per medical team, currently on Cefepime   We will cont to follow         Electronically signed by ARLYN Jose CNP on 6/25/2020 at 8:32 AM

## 2020-06-25 NOTE — ED NOTES
Report called to floor. cpap brought from home. Patient has cell phone and clothing. Wife aware of admission.       Aime Franco RN  06/24/20 4725

## 2020-06-25 NOTE — PROGRESS NOTES
This RN spoke with Dr. Sangita Evans and he stated patient may keep nasal spray brought from home at bedside.

## 2020-06-26 ENCOUNTER — APPOINTMENT (OUTPATIENT)
Dept: NUCLEAR MEDICINE | Age: 70
DRG: 862 | End: 2020-06-26
Payer: MEDICARE

## 2020-06-26 LAB
ANION GAP SERPL CALCULATED.3IONS-SCNC: 12 MMOL/L (ref 7–16)
BASOPHILS ABSOLUTE: 0.04 E9/L (ref 0–0.2)
BASOPHILS RELATIVE PERCENT: 0.7 % (ref 0–2)
BUN BLDV-MCNC: 15 MG/DL (ref 8–23)
CALCIUM SERPL-MCNC: 8.5 MG/DL (ref 8.6–10.2)
CHLORIDE BLD-SCNC: 96 MMOL/L (ref 98–107)
CO2: 28 MMOL/L (ref 22–29)
CREAT SERPL-MCNC: 0.9 MG/DL (ref 0.7–1.2)
EOSINOPHILS ABSOLUTE: 0.13 E9/L (ref 0.05–0.5)
EOSINOPHILS RELATIVE PERCENT: 2.3 % (ref 0–6)
GFR AFRICAN AMERICAN: >60
GFR NON-AFRICAN AMERICAN: >60 ML/MIN/1.73
GLUCOSE BLD-MCNC: 166 MG/DL (ref 74–99)
HCT VFR BLD CALC: 35.2 % (ref 37–54)
HEMOGLOBIN: 11.5 G/DL (ref 12.5–16.5)
IMMATURE GRANULOCYTES #: 0.05 E9/L
IMMATURE GRANULOCYTES %: 0.9 % (ref 0–5)
LV EF: 58 %
LV EF: 70 %
LVEF MODALITY: NORMAL
LVEF MODALITY: NORMAL
LYMPHOCYTES ABSOLUTE: 1.65 E9/L (ref 1.5–4)
LYMPHOCYTES RELATIVE PERCENT: 28.7 % (ref 20–42)
MAGNESIUM: 2.1 MG/DL (ref 1.6–2.6)
MCH RBC QN AUTO: 28.1 PG (ref 26–35)
MCHC RBC AUTO-ENTMCNC: 32.7 % (ref 32–34.5)
MCV RBC AUTO: 86.1 FL (ref 80–99.9)
MONOCYTES ABSOLUTE: 0.58 E9/L (ref 0.1–0.95)
MONOCYTES RELATIVE PERCENT: 10.1 % (ref 2–12)
NEUTROPHILS ABSOLUTE: 3.3 E9/L (ref 1.8–7.3)
NEUTROPHILS RELATIVE PERCENT: 57.3 % (ref 43–80)
ORGANISM: ABNORMAL
PDW BLD-RTO: 13.4 FL (ref 11.5–15)
PLATELET # BLD: 165 E9/L (ref 130–450)
PMV BLD AUTO: 9.2 FL (ref 7–12)
POTASSIUM REFLEX MAGNESIUM: 3.4 MMOL/L (ref 3.5–5)
RBC # BLD: 4.09 E12/L (ref 3.8–5.8)
SODIUM BLD-SCNC: 136 MMOL/L (ref 132–146)
TROPONIN: <0.01 NG/ML (ref 0–0.03)
URINE CULTURE, ROUTINE: ABNORMAL
WBC # BLD: 5.8 E9/L (ref 4.5–11.5)

## 2020-06-26 PROCEDURE — 6360000002 HC RX W HCPCS: Performed by: INTERNAL MEDICINE

## 2020-06-26 PROCEDURE — 93017 CV STRESS TEST TRACING ONLY: CPT

## 2020-06-26 PROCEDURE — A9500 TC99M SESTAMIBI: HCPCS | Performed by: RADIOLOGY

## 2020-06-26 PROCEDURE — 83735 ASSAY OF MAGNESIUM: CPT

## 2020-06-26 PROCEDURE — 99233 SBSQ HOSP IP/OBS HIGH 50: CPT | Performed by: INTERNAL MEDICINE

## 2020-06-26 PROCEDURE — 3430000000 HC RX DIAGNOSTIC RADIOPHARMACEUTICAL: Performed by: RADIOLOGY

## 2020-06-26 PROCEDURE — 93018 CV STRESS TEST I&R ONLY: CPT | Performed by: INTERNAL MEDICINE

## 2020-06-26 PROCEDURE — 78452 HT MUSCLE IMAGE SPECT MULT: CPT

## 2020-06-26 PROCEDURE — 6370000000 HC RX 637 (ALT 250 FOR IP): Performed by: INTERNAL MEDICINE

## 2020-06-26 PROCEDURE — 93016 CV STRESS TEST SUPVJ ONLY: CPT | Performed by: INTERNAL MEDICINE

## 2020-06-26 PROCEDURE — 85025 COMPLETE CBC W/AUTO DIFF WBC: CPT

## 2020-06-26 PROCEDURE — 2060000000 HC ICU INTERMEDIATE R&B

## 2020-06-26 PROCEDURE — 2580000003 HC RX 258: Performed by: INTERNAL MEDICINE

## 2020-06-26 PROCEDURE — 93306 TTE W/DOPPLER COMPLETE: CPT

## 2020-06-26 PROCEDURE — 83036 HEMOGLOBIN GLYCOSYLATED A1C: CPT

## 2020-06-26 PROCEDURE — 84484 ASSAY OF TROPONIN QUANT: CPT

## 2020-06-26 PROCEDURE — 36415 COLL VENOUS BLD VENIPUNCTURE: CPT

## 2020-06-26 PROCEDURE — 80048 BASIC METABOLIC PNL TOTAL CA: CPT

## 2020-06-26 RX ORDER — POTASSIUM CHLORIDE 20 MEQ/1
40 TABLET, EXTENDED RELEASE ORAL ONCE
Status: COMPLETED | OUTPATIENT
Start: 2020-06-26 | End: 2020-06-26

## 2020-06-26 RX ADMIN — RIVAROXABAN 20 MG: 20 TABLET, FILM COATED ORAL at 17:04

## 2020-06-26 RX ADMIN — POTASSIUM CHLORIDE 40 MEQ: 1500 TABLET, EXTENDED RELEASE ORAL at 20:49

## 2020-06-26 RX ADMIN — REGADENOSON 0.4 MG: 0.08 INJECTION, SOLUTION INTRAVENOUS at 12:04

## 2020-06-26 RX ADMIN — SODIUM CHLORIDE: 9 INJECTION, SOLUTION INTRAVENOUS at 00:00

## 2020-06-26 RX ADMIN — Medication 2 SPRAY: at 13:24

## 2020-06-26 RX ADMIN — SODIUM CHLORIDE, PRESERVATIVE FREE 10 ML: 5 INJECTION INTRAVENOUS at 20:51

## 2020-06-26 RX ADMIN — BETHANECHOL CHLORIDE 25 MG: 25 TABLET ORAL at 20:49

## 2020-06-26 RX ADMIN — ENOXAPARIN SODIUM 150 MG: 150 INJECTION SUBCUTANEOUS at 13:24

## 2020-06-26 RX ADMIN — POTASSIUM CHLORIDE 40 MEQ: 1500 TABLET, EXTENDED RELEASE ORAL at 07:12

## 2020-06-26 RX ADMIN — Medication 30 MILLICURIE: at 14:29

## 2020-06-26 RX ADMIN — MULTIPLE VITAMINS W/ MINERALS TAB 1 TABLET: TAB at 13:24

## 2020-06-26 RX ADMIN — BETHANECHOL CHLORIDE 25 MG: 25 TABLET ORAL at 13:25

## 2020-06-26 RX ADMIN — CEFEPIME 1 G: 1 INJECTION, POWDER, FOR SOLUTION INTRAMUSCULAR; INTRAVENOUS at 20:50

## 2020-06-26 RX ADMIN — LISINOPRIL 5 MG: 5 TABLET ORAL at 13:25

## 2020-06-26 RX ADMIN — DIPHENHYDRAMINE HCL 50 MG: 25 TABLET ORAL at 13:30

## 2020-06-26 RX ADMIN — DOXAZOSIN 1 MG: 1 TABLET ORAL at 20:49

## 2020-06-26 RX ADMIN — Medication 10 MILLICURIE: at 09:28

## 2020-06-26 RX ADMIN — SODIUM CHLORIDE, PRESERVATIVE FREE 10 ML: 5 INJECTION INTRAVENOUS at 13:28

## 2020-06-26 RX ADMIN — Medication 500 MG: at 13:24

## 2020-06-26 RX ADMIN — TAMSULOSIN HYDROCHLORIDE 0.4 MG: 0.4 CAPSULE ORAL at 13:24

## 2020-06-26 RX ADMIN — TAMSULOSIN HYDROCHLORIDE 0.4 MG: 0.4 CAPSULE ORAL at 16:45

## 2020-06-26 ASSESSMENT — PAIN SCALES - GENERAL: PAINLEVEL_OUTOF10: 0

## 2020-06-26 NOTE — PROCEDURES
Lexiscan Stress Test:    Lexiscan stress completed. No chest pain, no ischemia or arrhythmia on ECG. Nuclear SPECT images pending.        Electronically signed by Gaby Banrett MD on 6/26/2020 at 12:18 PM

## 2020-06-26 NOTE — PROGRESS NOTES
Derick, Kim Incoming Results From [facility]     Specimen Information: Urine, clean catch        Component Collected Lab   Organism Abnormal  06/24/2020  5:42 PM [facility]   Escherichia coli    Urine Culture, Routine 06/24/2020  5:42 PM [facility]   >100,000 CFU/ml    Testing Performed By     Lab - [address] Name Director Address Valid Date Range   49- - [address] [facility] Mardee Hatchet, MD [address] [place] [address]. Carrie Hall [id number] 12/03[birth year]19 1502-Present   Narrative   Performed by: [address] [place] [address] Lab   Source: URINE       Site:              Susceptibility     Escherichia coli (1)     Antibiotic Interpretation SNOAM Status    ampicillin Resistant >=^32 mcg/mL     ceFAZolin Sensitive <=^4 mcg/mL     cefepime Sensitive <=^0.12 mcg/mL     cefTRIAXone Sensitive <=^0.25 mcg/mL     Confirmatory Extended Spectrum Beta-Lactamase  Neg mcg/mL     ertapenem Sensitive <=^0.12 mcg/mL     gentamicin Resistant >=^16 mcg/mL     levofloxacin Resistant >=^8 mcg/mL     nitrofurantoin Sensitive <=^16 mcg/mL     piperacillin-tazobactam Sensitive <=^4 mcg/mL     tobramycin Intermediate ^8 mcg/mL     trimethoprim-sulfamethoxazole Sensitive <=^20 mcg/mL           Assessment/Plan:  BPH S/P TURP on 6. 5.20  E. Coli UTI     Urine culture +E. Coli  Blood cultures no growth to date   Cont antibiotics per medical team, currently on Cefepime  Home antibiotics per medical team   BMP and CBC pending   Continue Urecholine  Continue Flomax  Continue the carney catheter  He will be discharged with the carney. He was scheduled to see us on Monday for a voiding trial. I talked to him about postponing this for one more week until his infection is clear. He was agreeable to this.     No further  interventions planned at this time  Please call with further questions or concerns           Sravanthi Rudolph, APRN - CNP   [facility]

## 2020-06-26 NOTE — PROGRESS NOTES
Stress test Ok, Normal LV EF    Start Xarelto and discontinue Lovenox    Cardiology see as needed    F/u by [facility]y cardiology 2-4 weeks    Florentino Sidhu MD  6/26/2020  3:47 PM

## 2020-06-26 NOTE — CARE COORDINATION
CM Note: 6/26/2020 at 3:09pm: COVID testing not done. Talked to Mr. Josesito Gordon for transition of care. He states he lives with his wife and she will assist him. He states he went home with a carney after his surgery and knows how to care for it. At this time, he does not anticipate any discharge needs and states his wife will transport him home at discharge.  Julius De La Cruz RN

## 2020-06-26 NOTE — PLAN OF CARE
Problem: Airway Clearance - Ineffective:  Goal: Ability to maintain a clear airway will improve  Description: Ability to maintain a clear airway will improve  6/26/2020 0206 by Delores Mcnulty RN  Outcome: Met This Shift     Problem: Gas Exchange - Impaired:  Goal: Levels of oxygenation will improve  Description: Levels of oxygenation will improve  6/26/2020 0206 by Delores Mcnulty RN  Outcome: Met This Shift     Problem: Falls - Risk of:  Goal: Will remain free from falls  Description: Will remain free from falls  Outcome: Met This Shift     Problem: Falls - Risk of:  Goal: Absence of physical injury  Description: Absence of physical injury  Outcome: Met This Shift

## 2020-06-26 NOTE — PROGRESS NOTES
[facility]) Physicians        CARDIOLOGY                 INPATIENT PROGRESS NOTE          PATIENT SEEN IN CONSULT FOR: Atrial Fibrillation     Hospital Day: 3     Phi Rodrigues is a 81year old patient not known to [facility] cardiology. SUBJECTIVE: Denies any CP or SOB, No palpitations or dizzy, No pND  ROS: Review of rest of 10 systems negative except as mentioned above    OBJECTIVE: No acute distress. See Assessment     Diagnostics:       Telemetry: Sinus          Intake/Output Summary (Last 24 hours) at 6/26/2020 1214  Last data filed at 6/26/2020 0450  Gross per 24 hour   Intake 480 ml   Output 2000 ml   Net -1520 ml       Labs:   CBC:   Recent Labs     06/24/20  1419   WBC 7.9   HGB 12.2*   HCT 35.7*        BMP:   Recent Labs     06/24/20  1419      K 2.9*   CO2 28   BUN 17   CREATININE 1.2   LABGLOM >60   CALCIUM 8.5*     Mag: No results for input(s): MG in the last 72 hours. Phos: No results for input(s): PHOS in the last 72 hours. TSH: No results for input(s): TSH in the last 72 hours. HgA1c:     BNP: No results for input(s): BNP in the last 72 hours. PT/INR:   Recent Labs     06/24/20  1419   PROTIME 15.1*   INR 1.3     APTT:  Recent Labs     06/24/20  1419   APTT 25.9     CARDIAC ENZYMES:No results for input(s): CKTOTAL, CKMB, CKMBINDEX, TROPONINI in the last 72 hours.   FASTING LIPID PANEL:No results found for: CHOL, HDL, LDLDIRECT, LDLCALC, TRIG  LIVER PROFILE:  Recent Labs     06/24/20  1419   AST 24   ALT 29   LABALBU 3.3*       Current Inpatient Medications:   enoxaparin  1 mg/kg Subcutaneous BID    sodium chloride flush  10 mL Intravenous 2 times per day    vitamin C  500 mg Oral Daily    bethanechol  25 mg Oral TID    therapeutic multivitamin-minerals  1 tablet Oral Daily    oxymetazoline  2 spray Nasal BID    tamsulosin  0.4 mg Oral BID    lisinopril  5 mg Oral Daily    doxazosin  1 mg Oral Nightly    cefepime  1 g Intravenous Q12H       IV Infusions (if any):   sodium chloride Stopped (20 0200)         PHYSICAL EXAM:     CONSTITUTIONAL:   BP (!) 160/82   Pulse 80   Temp 98.1 °F (36.7 °C) (Oral)   Resp 18   Ht 6' 2\" (1.88 m)   Wt (!) 338 lb 6.4 oz (153.5 kg)   SpO2 93%   BMI 43.45 kg/m²   Pulse  Av.8  Min: 79  Max: 95  Systolic (22GQG), EOK:372 , Min:145 , IUK:464    Diastolic (44RGQ), NFE:92, Min:76, Max:82          In general, this is a well developed, well nourished who appears stated age. awake, alert, cooperative, no apparent distress  HEENT: eyes -conjunctivae pink,  Throat - Oral mucosa pink and moist.   Neck-  no stridor, no noted enlargement of the thyroid, no carotid bruit. no jugular venous distention   RESPIRATORY: Chest symmetrical and non-tender to palpation. No accessory muscle use. Lung auscultation - clear to auscultation except few rhonchi  CARDIOVASCULAR:     Heart Inspection shows no noted pulsations  Heart Palpation - no palpable thrills    Heart Ausculation - Regular rate and rhythm, 1/6 systolic murmur, No s3 or rub.   + lower extremity edema, no varicosities. Distal pulses palpable, no clubbing or cyanosis   ABDOMEN: Soft, nontender, Bowel sounds present. No Palpable masses   MS: good muscle strength and tone.   : Deferred  Rectal Exam: Deferred  SKIN: warm and dry no statis dermatitis or ulcers   NEURO / PSYCH: oriented to person, place           ASSESSMENT/PLAN:      Atrial Fibrillation with RVR - New diagnosis - [id number] VASc score 3; Rate control with Metoprolol, Lovenox anticoagulation for now - Check Echo to r/o thrombus, check LA size, LV and Valvular function - Risk benefits of OAC discussed, agreeable; Lexiscan stress today - If non ischemic -Start Xarelto 20mg ONCE daily with dinner    UTI/urinary retention - Urology on Case       Edema feet - Possible diastolic CHF, -ve [id number] - Echo pending    Sepsis ([address]     Hypokalemia - Supplement Kcl     Essential HTN - Monitor BP, On Lisinopril Diabetes     Morbid obesity - Diet, exercise and weight loss discussed.                Above recommendations discussed with him.         Electronically signed by Virgil Martell MD on 6/26/2020 at 12:14 PM  [facility]) Cardiology

## 2020-06-27 LAB
EKG ATRIAL RATE: 69 BPM
EKG P AXIS: 34 DEGREES
EKG P-R INTERVAL: 194 MS
EKG Q-T INTERVAL: 444 MS
EKG QRS DURATION: 140 MS
EKG QTC CALCULATION (BAZETT): 475 MS
EKG R AXIS: -56 DEGREES
EKG T AXIS: 12 DEGREES
EKG VENTRICULAR RATE: 69 BPM
METER GLUCOSE: 175 MG/DL (ref 74–99)
METER GLUCOSE: 319 MG/DL (ref 74–99)

## 2020-06-27 PROCEDURE — 2060000000 HC ICU INTERMEDIATE R&B

## 2020-06-27 PROCEDURE — 6370000000 HC RX 637 (ALT 250 FOR IP): Performed by: INTERNAL MEDICINE

## 2020-06-27 PROCEDURE — 82962 GLUCOSE BLOOD TEST: CPT

## 2020-06-27 PROCEDURE — 2580000003 HC RX 258: Performed by: INTERNAL MEDICINE

## 2020-06-27 PROCEDURE — 93005 ELECTROCARDIOGRAM TRACING: CPT | Performed by: INTERNAL MEDICINE

## 2020-06-27 PROCEDURE — 6360000002 HC RX W HCPCS: Performed by: INTERNAL MEDICINE

## 2020-06-27 RX ORDER — DOCUSATE SODIUM 100 MG/1
100 CAPSULE, LIQUID FILLED ORAL DAILY
Status: DISCONTINUED | OUTPATIENT
Start: 2020-06-27 | End: 2020-06-29 | Stop reason: HOSPADM

## 2020-06-27 RX ORDER — DEXTROSE MONOHYDRATE 50 MG/ML
100 INJECTION, SOLUTION INTRAVENOUS PRN
Status: DISCONTINUED | OUTPATIENT
Start: 2020-06-27 | End: 2020-06-29 | Stop reason: HOSPADM

## 2020-06-27 RX ORDER — POLYETHYLENE GLYCOL 3350 17 G/17G
17 POWDER, FOR SOLUTION ORAL DAILY
Status: DISCONTINUED | OUTPATIENT
Start: 2020-06-27 | End: 2020-06-29 | Stop reason: HOSPADM

## 2020-06-27 RX ORDER — DEXTROSE MONOHYDRATE 25 G/50ML
12.5 INJECTION, SOLUTION INTRAVENOUS PRN
Status: DISCONTINUED | OUTPATIENT
Start: 2020-06-27 | End: 2020-06-29 | Stop reason: HOSPADM

## 2020-06-27 RX ORDER — NICOTINE POLACRILEX 4 MG
15 LOZENGE BUCCAL PRN
Status: DISCONTINUED | OUTPATIENT
Start: 2020-06-27 | End: 2020-06-29 | Stop reason: HOSPADM

## 2020-06-27 RX ADMIN — BETHANECHOL CHLORIDE 25 MG: 25 TABLET ORAL at 21:14

## 2020-06-27 RX ADMIN — BETHANECHOL CHLORIDE 25 MG: 25 TABLET ORAL at 09:09

## 2020-06-27 RX ADMIN — ACETAMINOPHEN 650 MG: 325 TABLET, FILM COATED ORAL at 10:33

## 2020-06-27 RX ADMIN — CEFEPIME 1 G: 1 INJECTION, POWDER, FOR SOLUTION INTRAMUSCULAR; INTRAVENOUS at 09:06

## 2020-06-27 RX ADMIN — Medication 500 MG: at 09:08

## 2020-06-27 RX ADMIN — LISINOPRIL 5 MG: 5 TABLET ORAL at 09:09

## 2020-06-27 RX ADMIN — MULTIPLE VITAMINS W/ MINERALS TAB 1 TABLET: TAB at 09:09

## 2020-06-27 RX ADMIN — SODIUM CHLORIDE, PRESERVATIVE FREE 10 ML: 5 INJECTION INTRAVENOUS at 21:14

## 2020-06-27 RX ADMIN — BETHANECHOL CHLORIDE 25 MG: 25 TABLET ORAL at 13:17

## 2020-06-27 RX ADMIN — RIVAROXABAN 20 MG: 20 TABLET, FILM COATED ORAL at 18:11

## 2020-06-27 RX ADMIN — SODIUM CHLORIDE, PRESERVATIVE FREE 10 ML: 5 INJECTION INTRAVENOUS at 09:08

## 2020-06-27 RX ADMIN — INSULIN LISPRO 1 UNITS: 100 INJECTION, SOLUTION INTRAVENOUS; SUBCUTANEOUS at 22:50

## 2020-06-27 RX ADMIN — POLYETHYLENE GLYCOL 3350 17 G: 17 POWDER, FOR SOLUTION ORAL at 15:28

## 2020-06-27 RX ADMIN — Medication 2 SPRAY: at 09:08

## 2020-06-27 RX ADMIN — TAMSULOSIN HYDROCHLORIDE 0.4 MG: 0.4 CAPSULE ORAL at 09:09

## 2020-06-27 RX ADMIN — TAMSULOSIN HYDROCHLORIDE 0.4 MG: 0.4 CAPSULE ORAL at 15:28

## 2020-06-27 RX ADMIN — DIPHENHYDRAMINE HCL 50 MG: 25 TABLET ORAL at 15:30

## 2020-06-27 RX ADMIN — DOCUSATE SODIUM 100 MG: 100 CAPSULE, LIQUID FILLED ORAL at 15:28

## 2020-06-27 RX ADMIN — DIPHENHYDRAMINE HCL 50 MG: 25 TABLET ORAL at 01:22

## 2020-06-27 RX ADMIN — CEFEPIME 1 G: 1 INJECTION, POWDER, FOR SOLUTION INTRAMUSCULAR; INTRAVENOUS at 21:13

## 2020-06-27 RX ADMIN — SODIUM CHLORIDE: 9 INJECTION, SOLUTION INTRAVENOUS at 01:27

## 2020-06-27 RX ADMIN — SODIUM CHLORIDE: 9 INJECTION, SOLUTION INTRAVENOUS at 13:17

## 2020-06-27 RX ADMIN — DOXAZOSIN 1 MG: 1 TABLET ORAL at 21:14

## 2020-06-27 ASSESSMENT — PAIN DESCRIPTION - PAIN TYPE: TYPE: ACUTE PAIN

## 2020-06-27 ASSESSMENT — PAIN SCALES - GENERAL
PAINLEVEL_OUTOF10: 0
PAINLEVEL_OUTOF10: 0
PAINLEVEL_OUTOF10: 3

## 2020-06-27 ASSESSMENT — PAIN DESCRIPTION - PROGRESSION: CLINICAL_PROGRESSION: NOT CHANGED

## 2020-06-27 ASSESSMENT — PAIN DESCRIPTION - DESCRIPTORS: DESCRIPTORS: ACHING;DISCOMFORT;DULL;HEADACHE

## 2020-06-27 ASSESSMENT — PAIN DESCRIPTION - FREQUENCY: FREQUENCY: INTERMITTENT

## 2020-06-27 ASSESSMENT — PAIN - FUNCTIONAL ASSESSMENT: PAIN_FUNCTIONAL_ASSESSMENT: ACTIVITIES ARE NOT PREVENTED

## 2020-06-27 ASSESSMENT — PAIN DESCRIPTION - ONSET: ONSET: GRADUAL

## 2020-06-27 ASSESSMENT — PAIN DESCRIPTION - LOCATION: LOCATION: HEAD

## 2020-06-27 NOTE — PROGRESS NOTES
Progress Note    Subjective:Patient feeling much better no fever no chills , antibiotics helping  Patient developed atrial fibrillation with RVR, cardiology consulted, echocardiogram and a stress test ordered and reviewed    ROS: denies fever, chills, cp, sob, n/v, HA unless stated above.  rivaroxaban  20 mg Oral Daily    sodium chloride flush  10 mL Intravenous 2 times per day    vitamin C  500 mg Oral Daily    bethanechol  25 mg Oral TID    therapeutic multivitamin-minerals  1 tablet Oral Daily    oxymetazoline  2 spray Nasal BID    tamsulosin  0.4 mg Oral BID    lisinopril  5 mg Oral Daily    doxazosin  1 mg Oral Nightly    cefepime  1 g Intravenous Q12H     prochlorperazine, 10 mg, Q6H PRN  perflutren lipid microspheres, 1.5 mL, ONCE PRN  sodium chloride flush, 10 mL, PRN  acetaminophen, 650 mg, Q6H PRN    Or  acetaminophen, 650 mg, Q6H PRN  diphenhydrAMINE, 50 mg, Q6H PRN  sodium chloride, 1 spray, PRN         Objective: Intake/Output Summary (Last 24 hours) at 6/26/2020 2051  Last data filed at 6/26/2020 2016  Gross per 24 hour   Intake 360 ml   Output 2600 ml   Net -2240 ml       BP (!) 167/83   Pulse 99   Temp 98.4 °F (36.9 °C) (Oral)   Resp 23   Ht 6' 2\" (1.88 m)   Wt (!) 338 lb 6.4 oz (153.5 kg)   SpO2 95%   BMI 43.45 kg/m²     Constitutional:  Well developed, well nourished, obese, no acute distress, non-toxic appearance   Eyes:  PERRL, conjunctiva normal, EOMI  HENT:  Atraumatic, external ears normal, nose normal, oropharynx moist. Neck- normal range of motion, no nuchal rigidity   Respiratory:  No respiratory distress, normal breath sounds, no rales, no wheezing   Cardiovascular:  Tachycardic rate, normal rhythm, no murmurs, no gallops, no rubs. Radial and DP pulses 2+ bilaterally. GI:  Soft, nondistended, normal bowel sounds, nontender, no organomegaly, no mass, no rebound, no guarding   :  No costovertebral angle tenderness.  Indwelling carney catheter in place with dark yellow urine with sediment draining in bag   Musculoskeletal:  3+ bilateral lower extremity pitting edema, no tenderness, no deformities. Back- no tenderness  Integument:  Well hydrated, no rash. Adequate perfusion. Lymphatic:  No cervical lymphadenopathy noted   Neurologic:  Alert & oriented x 3, CN 2-12 normal, no focal deficits noted.     Psychiatric:  Speech and behavior appropriate     Recent Labs     06/24/20  1419 06/26/20  1717    136   K 2.9* 3.4*   CL 90* 96*   CO2 28 28   BUN 17 15   CREATININE 1.2 0.9   GLUCOSE 217* 166*   CALCIUM 8.5* 8.5*       Recent Labs     06/24/20  1419 06/26/20  1717   WBC 7.9 5.8   RBC 4.19 4.09   HGB 12.2* 11.5*   HCT 35.7* 35.2*   MCV 85.2 86.1   MCH 29.1 28.1   MCHC 34.2 32.7   RDW 13.4 13.4    165   MPV 9.5 9.2           Radiology: Xr Chest Portable    Result Date: 6/24/2020  EXAMINATION: ONE XRAY VIEW OF THE CHEST 6/24/2020 1:48 pm COMPARISON: None. HISTORY: ORDERING SYSTEM PROVIDED HISTORY: fever TECHNOLOGIST PROVIDED HISTORY: Reason for exam:->fever FINDINGS: Cardiomegaly. Normal pulmonary vasculature. No focal consolidation, pleural effusion, pneumothorax. No evidence of acute process. Cardiomegaly. Xr Urogram W Wo Kub W Wo Tomogram    Result Date: 6/5/2020  EXAMINATION: SPOT FLUOROSCOPIC IMAGES 6/5/2020 1:19 pm COMPARISON: Abdomen and pelvis CT 05/15/2020 HISTORY: ORDERING SYSTEM PROVIDED HISTORY: Benign prostatic hyperplasia with lower urinary tract symptoms, symptom details unspecified TECHNOLOGIST PROVIDED HISTORY: Reason for exam:->cysto,retrogrades,TURP Intraprocedural imaging. TECHNIQUE: Fluoroscopy was provided by the radiology department for procedure. Radiologist was not present during examination. FLUOROSCOPY DOSE AND TYPE OR TIME AND EXPOSURES: Fluoroscopy time 30.5 seconds, DAP 1.06 mGy*m2 FINDINGS: 11 spot images of the abdomen and pelvis were obtained.  Cannulation of the right ureter via cystoscopy with instillation of contrast. No right hydroureteronephrosis. No evident urothelial abnormalities nor strictures in the right ureter. Intraprocedural fluoroscopic spot images as above. See separate procedure note for more information. Us Dup Lower Extremities Bilateral Venous    Result Date: 6/24/2020  EXAMINATION: DUPLEX VENOUS ULTRASOUND OF THE BILATERAL LOWER EXTREMITIES, 6/24/2020 6:47 pm TECHNIQUE: Duplex ultrasound and Doppler images were obtained of the bilateral lower extremities COMPARISON: None. HISTORY: ORDERING SYSTEM PROVIDED HISTORY: edema TECHNOLOGIST PROVIDED HISTORY: Reason for exam:->edema FINDINGS: The visualized veins of the bilateral lower extremities are patent and free of echogenic thrombus. The veins are normally compressible and have normal phasic flow. No evidence of DVT in either lower extremity. Assessment:    Patient Active Problem List   Diagnosis Code    COVID-19 virus infection U07.1    Sepsis ([address] A41.9    Paroxysmal atrial fibrillation (HCC) I48.0    Essential hypertension I10    Urinary tract infection without hematuria N39.0    Morbid obesity ([address] E66.01     Sepsis ([address] secondary to urinary procedure  Urinary tract infection  Hypokalemia  Hypocalcemia  Urinary retention  Morbid obesity  BMI >43  Atrial fibrillation with RVR      PLAN:  1. Admit to telemetry  2. Sepsis Protocol  3. Continue home medications  4. Will start broad spectrum  IV antibiotics to cover  bugs  5. Rest of medications as per order sheet  6. Consult Dr. Ángel Nicholson  7. Cardiology consulted  8.  Stress test echocardiogram ordered patient placed on anticoagulation     Code Status: Full code  DVT prophylaxis: Lovenox 40 mg once a day as directed        Vick Arellano

## 2020-06-27 NOTE — PROGRESS NOTES
Pt passed blood around carney catheter when attempting to pass a BM, was only able to pass gas.  It is noted in his hx of past TURP 06/05 with Dr. Espinosa Me

## 2020-06-27 NOTE — PLAN OF CARE
Problem: Discharge Planning:  Goal: Participates in care planning  Description: Participates in care planning  6/27/2020 1008 by Chet Davenport RN  Outcome: Met This Shift  6/27/2020 0744 by Zeyad Cosme RN  Outcome: Not Met This Shift  Goal: Discharged to appropriate level of care  Description: Discharged to appropriate level of care  6/27/2020 1008 by Chet Davenport RN  Outcome: Met This Shift  6/27/2020 0744 by Zeyad Cosme RN  Outcome: Not Met This Shift     Problem: Airway Clearance - Ineffective:  Goal: Ability to maintain a clear airway will improve  Description: Ability to maintain a clear airway will improve  6/27/2020 1008 by Chet Davenport RN  Outcome: Met This Shift  6/27/2020 0744 by Zeyad Cosme RN  Outcome: Met This Shift     Problem: Cardiac Output - Decreased:  Goal: Hemodynamic stability will improve  Description: Hemodynamic stability will improve  6/27/2020 1008 by Chet Davenport RN  Outcome: Met This Shift  6/27/2020 0744 by Zeyad Cosme RN  Outcome: Not Met This Shift     Problem: Cardiac:  Goal: Ability to maintain vital signs within normal range will improve  Description: Ability to maintain vital signs within normal range will improve  6/27/2020 1008 by Chet Davenport RN  Outcome: Met This Shift  6/27/2020 0744 by Zeyad Cosme RN  Outcome: Met This Shift  Goal: Cardiovascular alteration will improve  Description: Cardiovascular alteration will improve  6/27/2020 1008 by Chet Davenport RN  Outcome: Met This Shift  6/27/2020 0744 by Zeyad Cosme RN  Outcome: Met This Shift     Problem: Health Behavior:  Goal: Will modify at least one risk factor affecting health status  Description: Will modify at least one risk factor affecting health status  Outcome: Met This Shift  Goal: Identification of resources available to assist in meeting health care needs will improve  Description: Identification of resources available to assist in meeting health care needs will improve  Outcome: Met This Shift  Goal: Ability to identify and utilize available resources and services will improve  Description: Ability to identify and utilize available resources and services will improve  Outcome: Met This Shift  Goal: Ability to manage health-related needs will improve  Description: Ability to manage health-related needs will improve  Outcome: Met This Shift     Problem: Physical Regulation:  Goal: Complications related to the disease process, condition or treatment will be avoided or minimized  Description: Complications related to the disease process, condition or treatment will be avoided or minimized  Outcome: Met This Shift  Goal: Diagnostic test results will improve  Description: Diagnostic test results will improve  Outcome: Met This Shift     Problem:  Activity:  Goal: Risk for activity intolerance will decrease  Description: Risk for activity intolerance will decrease  6/27/2020 1008 by Dano Mccauley RN  Outcome: Met This Shift  6/27/2020 0744 by Tala Hung RN  Outcome: Met This Shift     Problem: Coping:  Goal: Ability to adjust to condition or change in health will improve  Description: Ability to adjust to condition or change in health will improve  6/27/2020 1008 by Dano Mccauley RN  Outcome: Met This Shift  6/27/2020 0744 by Tala Hung RN  Outcome: Met This Shift     Problem: Fluid Volume:  Goal: Ability to maintain a balanced intake and output will improve  Description: Ability to maintain a balanced intake and output will improve  Outcome: Met This Shift     Problem: Metabolic:  Goal: Ability to maintain appropriate glucose levels will improve  Description: Ability to maintain appropriate glucose levels will improve  Outcome: Met This Shift     Problem: Nutritional:  Goal: Maintenance of adequate nutrition will improve  Description: Maintenance of adequate nutrition will improve  Outcome: Met This Shift  Goal: Progress toward achieving an optimal weight will improve  Description: Progress toward achieving an optimal weight will improve  Outcome: Met This Shift     Problem: Skin Integrity:  Goal: Risk for impaired skin integrity will decrease  Description: Risk for impaired skin integrity will decrease  Outcome: Met This Shift     Problem: Tissue Perfusion:  Goal: Adequacy of tissue perfusion will improve  Description: Adequacy of tissue perfusion will improve  Outcome: Met This Shift     Problem: Activity Intolerance:  Goal: Ability to perform activities of daily living will improve  Description: Ability to perform activities of daily living will improve  Outcome: Met This Shift     Problem: Gas Exchange - Impaired:  Goal: Levels of oxygenation will improve  Description: Levels of oxygenation will improve  Outcome: Met This Shift     Problem: Mood - Altered:  Goal: Emotional status will stabilize  Description: Emotional status will stabilize  Outcome: Met This Shift     Problem: Tobacco Use:  Goal: Will participate in inpatient tobacco-use cessation counseling  Description: Will participate in inpatient tobacco-use cessation counseling  Outcome: Met This Shift     Problem: Falls - Risk of:  Goal: Will remain free from falls  Description: Will remain free from falls  Outcome: Met This Shift  Goal: Absence of physical injury  Description: Absence of physical injury  Outcome: Met This Shift   Continue current treatments      Problem:  Bowel Function - Altered:  Goal: Bowel elimination is within specified parameters  Description: Bowel elimination is within specified parameters  6/27/2020 1008 by Fang Najera RN  Outcome: Not Met This Shift  6/27/2020 0744 by Krupa Sharma RN  Outcome: Not Met This Shift   Increased patient mobility

## 2020-06-27 NOTE — PROGRESS NOTES
Progress Note    Subjective:Patient feeling much better no fever no chills , antibiotics helping  Patient developed atrial fibrillation with RVR, cardiology consulted, echocardiogram and a stress test ordered and reviewed    ROS: denies fever, chills, cp, sob, n/v, HA unless stated above.  rivaroxaban  20 mg Oral Daily    sodium chloride flush  10 mL Intravenous 2 times per day    vitamin C  500 mg Oral Daily    bethanechol  25 mg Oral TID    therapeutic multivitamin-minerals  1 tablet Oral Daily    oxymetazoline  2 spray Nasal BID    tamsulosin  0.4 mg Oral BID    lisinopril  5 mg Oral Daily    doxazosin  1 mg Oral Nightly    cefepime  1 g Intravenous Q12H     prochlorperazine, 10 mg, Q6H PRN  perflutren lipid microspheres, 1.5 mL, ONCE PRN  sodium chloride flush, 10 mL, PRN  acetaminophen, 650 mg, Q6H PRN    Or  acetaminophen, 650 mg, Q6H PRN  diphenhydrAMINE, 50 mg, Q6H PRN  sodium chloride, 1 spray, PRN         Objective: Intake/Output Summary (Last 24 hours) at 6/27/2020 1301  Last data filed at 6/27/2020 1025  Gross per 24 hour   Intake 720 ml   Output 2250 ml   Net -1530 ml       BP (!) 164/79   Pulse 73   Temp 98 °F (36.7 °C) (Oral)   Resp 16   Ht 6' 2\" (1.88 m)   Wt (!) 337 lb 6.4 oz (153 kg)   SpO2 93%   BMI 43.32 kg/m²     Constitutional:  Well developed, well nourished, obese, no acute distress, non-toxic appearance   Eyes:  PERRL, conjunctiva normal, EOMI  HENT:  Atraumatic, external ears normal, nose normal, oropharynx moist. Neck- normal range of motion, no nuchal rigidity   Respiratory:  No respiratory distress, normal breath sounds, no rales, no wheezing   Cardiovascular:  Tachycardic rate, normal rhythm, no murmurs, no gallops, no rubs. Radial and DP pulses 2+ bilaterally. GI:  Soft, nondistended, normal bowel sounds, nontender, no organomegaly, no mass, no rebound, no guarding   :  No costovertebral angle tenderness.  Indwelling carney catheter in place with dark yellow urine with sediment draining in bag   Musculoskeletal:  3+ bilateral lower extremity pitting edema, no tenderness, no deformities. Back- no tenderness  Integument:  Well hydrated, no rash. Adequate perfusion. Lymphatic:  No cervical lymphadenopathy noted   Neurologic:  Alert & oriented x 3, CN 2-12 normal, no focal deficits noted.     Psychiatric:  Speech and behavior appropriate     Recent Labs     06/24/20  1419 06/26/20  1717    136   K 2.9* 3.4*   CL 90* 96*   CO2 28 28   BUN 17 15   CREATININE 1.2 0.9   GLUCOSE 217* 166*   CALCIUM 8.5* 8.5*       Recent Labs     06/24/20  1419 06/26/20  1717   WBC 7.9 5.8   RBC 4.19 4.09   HGB 12.2* 11.5*   HCT 35.7* 35.2*   MCV 85.2 86.1   MCH 29.1 28.1   MCHC 34.2 32.7   RDW 13.4 13.4    165   MPV 9.5 9.2           Radiology: Xr Chest Portable    Result Date: 6/24/2020  EXAMINATION: ONE XRAY VIEW OF THE CHEST 6/24/2020 1:48 pm COMPARISON: None. HISTORY: ORDERING SYSTEM PROVIDED HISTORY: fever TECHNOLOGIST PROVIDED HISTORY: Reason for exam:->fever FINDINGS: Cardiomegaly. Normal pulmonary vasculature. No focal consolidation, pleural effusion, pneumothorax. No evidence of acute process. Cardiomegaly. Xr Urogram W Wo Kub W Wo Tomogram    Result Date: 6/5/2020  EXAMINATION: SPOT FLUOROSCOPIC IMAGES 6/5/2020 1:19 pm COMPARISON: Abdomen and pelvis CT 05/15/2020 HISTORY: ORDERING SYSTEM PROVIDED HISTORY: Benign prostatic hyperplasia with lower urinary tract symptoms, symptom details unspecified TECHNOLOGIST PROVIDED HISTORY: Reason for exam:->cysto,retrogrades,TURP Intraprocedural imaging. TECHNIQUE: Fluoroscopy was provided by the radiology department for procedure. Radiologist was not present during examination. FLUOROSCOPY DOSE AND TYPE OR TIME AND EXPOSURES: Fluoroscopy time 30.5 seconds, DAP 1.06 mGy*m2 FINDINGS: 11 spot images of the abdomen and pelvis were obtained.  Cannulation of the right ureter via cystoscopy with instillation of contrast. No right hydroureteronephrosis. No evident urothelial abnormalities nor strictures in the right ureter. Intraprocedural fluoroscopic spot images as above. See separate procedure note for more information. Us Dup Lower Extremities Bilateral Venous    Result Date: 6/24/2020  EXAMINATION: DUPLEX VENOUS ULTRASOUND OF THE BILATERAL LOWER EXTREMITIES, 6/24/2020 6:47 pm TECHNIQUE: Duplex ultrasound and Doppler images were obtained of the bilateral lower extremities COMPARISON: None. HISTORY: ORDERING SYSTEM PROVIDED HISTORY: edema TECHNOLOGIST PROVIDED HISTORY: Reason for exam:->edema FINDINGS: The visualized veins of the bilateral lower extremities are patent and free of echogenic thrombus. The veins are normally compressible and have normal phasic flow. No evidence of DVT in either lower extremity. Assessment:    Patient Active Problem List   Diagnosis Code    COVID-19 virus infection U07.1    Sepsis ([address] A41.9    Paroxysmal atrial fibrillation (HCC) I48.0    Essential hypertension I10    Urinary tract infection without hematuria N39.0    Morbid obesity ([address] E66.01     Sepsis ([address] secondary to urinary procedure  Urinary tract infection  Hypokalemia  Hypocalcemia  Urinary retention  Morbid obesity  BMI >43  Atrial fibrillation with RVR  Hyperglycemia        PLAN:  1. Admit to telemetry  2. Sepsis Protocol  3. Continue home medications  4. Will start broad spectrum  IV antibiotics to cover  bugs  5. Rest of medications as per order sheet  6. Consult Dr. Génesis Negron  7. Cardiology consulted  8. Stress test echocardiogram reviewed and WNL  patient placed on anticoagulation  9. Will check hemoglobin A1c and start the patient on sliding scale insulin  10.  D/C in am     Code Status: Full code  DVT prophylaxis: Lovenox 40 mg once a day as directed        Kylah Meraz

## 2020-06-28 LAB
HBA1C MFR BLD: 8.4 % (ref 4–5.6)
METER GLUCOSE: 123 MG/DL (ref 74–99)
METER GLUCOSE: 136 MG/DL (ref 74–99)
METER GLUCOSE: 151 MG/DL (ref 74–99)
METER GLUCOSE: 202 MG/DL (ref 74–99)

## 2020-06-28 PROCEDURE — 2060000000 HC ICU INTERMEDIATE R&B

## 2020-06-28 PROCEDURE — 6360000002 HC RX W HCPCS: Performed by: INTERNAL MEDICINE

## 2020-06-28 PROCEDURE — 2580000003 HC RX 258: Performed by: INTERNAL MEDICINE

## 2020-06-28 PROCEDURE — 82962 GLUCOSE BLOOD TEST: CPT

## 2020-06-28 PROCEDURE — 6370000000 HC RX 637 (ALT 250 FOR IP): Performed by: INTERNAL MEDICINE

## 2020-06-28 RX ADMIN — BETHANECHOL CHLORIDE 25 MG: 25 TABLET ORAL at 08:23

## 2020-06-28 RX ADMIN — MULTIPLE VITAMINS W/ MINERALS TAB 1 TABLET: TAB at 08:23

## 2020-06-28 RX ADMIN — DOCUSATE SODIUM 100 MG: 100 CAPSULE, LIQUID FILLED ORAL at 08:23

## 2020-06-28 RX ADMIN — BETHANECHOL CHLORIDE 25 MG: 25 TABLET ORAL at 21:33

## 2020-06-28 RX ADMIN — LISINOPRIL 5 MG: 5 TABLET ORAL at 08:23

## 2020-06-28 RX ADMIN — Medication 500 MG: at 08:23

## 2020-06-28 RX ADMIN — CEFEPIME 1 G: 1 INJECTION, POWDER, FOR SOLUTION INTRAMUSCULAR; INTRAVENOUS at 14:08

## 2020-06-28 RX ADMIN — INSULIN LISPRO 4 UNITS: 100 INJECTION, SOLUTION INTRAVENOUS; SUBCUTANEOUS at 12:37

## 2020-06-28 RX ADMIN — POLYETHYLENE GLYCOL 3350 17 G: 17 POWDER, FOR SOLUTION ORAL at 08:23

## 2020-06-28 RX ADMIN — SODIUM CHLORIDE, PRESERVATIVE FREE 10 ML: 5 INJECTION INTRAVENOUS at 21:35

## 2020-06-28 RX ADMIN — SODIUM CHLORIDE, PRESERVATIVE FREE 10 ML: 5 INJECTION INTRAVENOUS at 08:22

## 2020-06-28 RX ADMIN — INSULIN LISPRO 2 UNITS: 100 INJECTION, SOLUTION INTRAVENOUS; SUBCUTANEOUS at 08:24

## 2020-06-28 RX ADMIN — DOXAZOSIN 1 MG: 1 TABLET ORAL at 21:33

## 2020-06-28 RX ADMIN — DIPHENHYDRAMINE HCL 50 MG: 25 TABLET ORAL at 00:55

## 2020-06-28 RX ADMIN — DIPHENHYDRAMINE HCL 50 MG: 25 TABLET ORAL at 21:33

## 2020-06-28 RX ADMIN — BETHANECHOL CHLORIDE 25 MG: 25 TABLET ORAL at 14:08

## 2020-06-28 ASSESSMENT — PAIN SCALES - GENERAL
PAINLEVEL_OUTOF10: 0
PAINLEVEL_OUTOF10: 0

## 2020-06-28 NOTE — PROGRESS NOTES
Patient is complaining of a cold, feels like his \"bronchitis is flaring up \", productive cough, coughing up yellow sputum. Said robitussin is not working and all the symptoms are \"making my asthma worse\". He was unsure of fever. Please advise. [facility]  PROGRESS NOTE    Chief Complaint:   BPH (S/P TURP on 6/5/20)/UTI/Sepsis    HPI: He is tired but feeling much better now. He denies any pain or catheter discomfort.     Vitals:    06/27/20 2113   BP: (!) 175/86   Pulse: 93   Resp: 18   Temp: 98.4 °F (36.9 °C)   SpO2: 93%       Allergies: Penicillins    PAST MEDICAL HISTORY:   Past Medical History:   Diagnosis Date    Arthritis     lower back    Asthma     Diabetes mellitus ([address]     diet controlled    H/O cardiovascular stress test 06/26/2020    Lexiscan    Hypertension     Sleep apnea        PAST SURGICAL HISTORY:   Past Surgical History:   Procedure Laterality Date    CHOLECYSTECTOMY  1995    TONSILLECTOMY      TURP  08/04/2017    cr    TURP N/A 6/5/2020    CYSTOSCOPY, RETROGRADE, PYELOGRAM. CYSTOGRAM. TRANSURETHRAL RESECTION PROSTATE performed by Mary Rader Enrrique, DO at [facility] OR        PAST FAMILY HISTORY:    Family History   Problem Relation Age of Onset    Cancer Mother     Heart Disease Father     Cancer Brother        PAST SOCIAL HISTORY:    Social History     Socioeconomic History    Marital status:      Spouse name: None    Number of children: None    Years of education: None    Highest education level: None   Occupational History    None   Social Needs    Financial resource strain: None    Food insecurity     Worry: None     Inability: None    Transportation needs     Medical: None     Non-medical: None   Tobacco Use    Smoking status: Never Smoker    Smokeless tobacco: Never Used   Substance and Sexual Activity    Alcohol use: No    Drug use: No    Sexual activity: None   Lifestyle    Physical activity     Days per week: None     Minutes per session: None    Stress: None   Relationships    Social connections     Talks on phone: None     Gets together: None     Attends [employer] service: None     Active member of club or organization: None     Attends meetings of clubs or organizations: None     Relationship showed benign prostate tissue without evidence of malignancy  -Urine cytology on 9/19/2020 was negative  -Urine culture on 6/24/2020 is growing E. coli. Continue antibiotics  -Stop Flomax.   Continue Urecholine  -Voiding trial as an outpatient in roughly 1 week  -Stable for discharge from urology standpoint        Mateusz Cohn MD  6/27/2020  10:12 PM

## 2020-06-28 NOTE — PROGRESS NOTES
Progress Note    Subjective:Patient feeling much better no fever no chills , antibiotics helping  Patient developed atrial fibrillation with RVR, cardiology consulted, echocardiogram and a stress test ordered and reviewed    ROS: denies fever, chills, cp, sob, n/v, HA unless stated above.  docusate sodium  100 mg Oral Daily    polyethylene glycol  17 g Oral Daily    insulin lispro  0-12 Units Subcutaneous TID WC    insulin lispro  0-6 Units Subcutaneous Nightly    rivaroxaban  20 mg Oral Daily    sodium chloride flush  10 mL Intravenous 2 times per day    vitamin C  500 mg Oral Daily    bethanechol  25 mg Oral TID    therapeutic multivitamin-minerals  1 tablet Oral Daily    lisinopril  5 mg Oral Daily    doxazosin  1 mg Oral Nightly    cefepime  1 g Intravenous Q12H     glucose, 15 g, PRN  dextrose, 12.5 g, PRN  glucagon (rDNA), 1 mg, PRN  dextrose, 100 mL/hr, PRN  prochlorperazine, 10 mg, Q6H PRN  perflutren lipid microspheres, 1.5 mL, ONCE PRN  sodium chloride flush, 10 mL, PRN  acetaminophen, 650 mg, Q6H PRN    Or  acetaminophen, 650 mg, Q6H PRN  diphenhydrAMINE, 50 mg, Q6H PRN  sodium chloride, 1 spray, PRN         Objective: Intake/Output Summary (Last 24 hours) at 6/28/2020 1454  Last data filed at 6/28/2020 1249  Gross per 24 hour   Intake 1320 ml   Output 3575 ml   Net -2255 ml       BP (!) 168/68 Comment: manual  Pulse 86   Temp 97.5 °F (36.4 °C) (Oral)   Resp 19   Ht 6' 2\" (1.88 m)   Wt (!) 340 lb 8 oz (154.4 kg)   SpO2 93%   BMI 43.72 kg/m²     Constitutional:  Well developed, well nourished, obese, no acute distress, non-toxic appearance   Eyes:  PERRL, conjunctiva normal, EOMI  HENT:  Atraumatic, external ears normal, nose normal, oropharynx moist. Neck- normal range of motion, no nuchal rigidity   Respiratory:  No respiratory distress, normal breath sounds, no rales, no wheezing   Cardiovascular:  Tachycardic rate, normal rhythm, no murmurs, no gallops, no rubs.  Radial and DP pulses 2+ bilaterally. GI:  Soft, nondistended, normal bowel sounds, nontender, no organomegaly, no mass, no rebound, no guarding   :  No costovertebral angle tenderness. Indwelling carney catheter in place with dark yellow urine with sediment draining in bag   Musculoskeletal:  3+ bilateral lower extremity pitting edema, no tenderness, no deformities. Back- no tenderness  Integument:  Well hydrated, no rash. Adequate perfusion. Lymphatic:  No cervical lymphadenopathy noted   Neurologic:  Alert & oriented x 3, CN 2-12 normal, no focal deficits noted.     Psychiatric:  Speech and behavior appropriate     Recent Labs     06/26/20  1717      K 3.4*   CL 96*   CO2 28   BUN 15   CREATININE 0.9   GLUCOSE 166*   CALCIUM 8.5*       Recent Labs     06/26/20  1717   WBC 5.8   RBC 4.09   HGB 11.5*   HCT 35.2*   MCV 86.1   MCH 28.1   MCHC 32.7   RDW 13.4      MPV 9.2           Radiology: Xr Chest Portable    Result Date: 6/24/2020  EXAMINATION: ONE XRAY VIEW OF THE CHEST 6/24/2020 1:48 pm COMPARISON: None. HISTORY: ORDERING SYSTEM PROVIDED HISTORY: fever TECHNOLOGIST PROVIDED HISTORY: Reason for exam:->fever FINDINGS: Cardiomegaly. Normal pulmonary vasculature. No focal consolidation, pleural effusion, pneumothorax. No evidence of acute process. Cardiomegaly. Xr Urogram W Wo Kub W Wo Tomogram    Result Date: 6/5/2020  EXAMINATION: SPOT FLUOROSCOPIC IMAGES 6/5/2020 1:19 pm COMPARISON: Abdomen and pelvis CT 05/15/2020 HISTORY: ORDERING SYSTEM PROVIDED HISTORY: Benign prostatic hyperplasia with lower urinary tract symptoms, symptom details unspecified TECHNOLOGIST PROVIDED HISTORY: Reason for exam:->cysto,retrogrades,TURP Intraprocedural imaging. TECHNIQUE: Fluoroscopy was provided by the radiology department for procedure. Radiologist was not present during examination.  FLUOROSCOPY DOSE AND TYPE OR TIME AND EXPOSURES: Fluoroscopy time 30.5 seconds, DAP 1.06 mGy*m2 FINDINGS: 11 spot images of the abdomen and pelvis were obtained. Cannulation of the right ureter via cystoscopy with instillation of contrast. No right hydroureteronephrosis. No evident urothelial abnormalities nor strictures in the right ureter. Intraprocedural fluoroscopic spot images as above. See separate procedure note for more information. Us Dup Lower Extremities Bilateral Venous    Result Date: 6/24/2020  EXAMINATION: DUPLEX VENOUS ULTRASOUND OF THE BILATERAL LOWER EXTREMITIES, 6/24/2020 6:47 pm TECHNIQUE: Duplex ultrasound and Doppler images were obtained of the bilateral lower extremities COMPARISON: None. HISTORY: ORDERING SYSTEM PROVIDED HISTORY: edema TECHNOLOGIST PROVIDED HISTORY: Reason for exam:->edema FINDINGS: The visualized veins of the bilateral lower extremities are patent and free of echogenic thrombus. The veins are normally compressible and have normal phasic flow. No evidence of DVT in either lower extremity. Assessment:    Patient Active Problem List   Diagnosis Code    COVID-19 virus infection U07.1    Sepsis ([address] A41.9    Paroxysmal atrial fibrillation (HCC) I48.0    Essential hypertension I10    Urinary tract infection without hematuria N39.0    Morbid obesity ([address] E66.01     Sepsis ([address] secondary to urinary procedure  Urinary tract infection  Hypokalemia  Hypocalcemia  Urinary retention  Morbid obesity  BMI >43  Atrial fibrillation with RVR  Hyperglycemia        PLAN:  1. Admit to telemetry  2. Sepsis Protocol  3. Continue home medications  4. Will start broad spectrum  IV antibiotics to cover  bugs  5. Rest of medications as per order sheet  6. Consult Dr. Adriane Ignacio  7. Cardiology consulted  8. Stress test echocardiogram reviewed and WNL  patient placed on anticoagulation  9. Will check hemoglobin A1c and start the patient on sliding scale insulin  10.  D/C in am     Code Status: Full code  DVT prophylaxis: Lovenox 40 mg once a day as directed        Sariah Barros

## 2020-06-28 NOTE — PLAN OF CARE
Problem: Airway Clearance - Ineffective:  Goal: Ability to maintain a clear airway will improve  6/28/2020 1716 by Tita Yuen RN  Outcome: Met This Shift     Problem: Cardiac Output - Decreased:  Goal: Hemodynamic stability will improve  6/28/2020 1716 by Tiat Yuen RN  Outcome: Met This Shift     Problem: Gas Exchange - Impaired:  Goal: Levels of oxygenation will improve  Outcome: Met This Shift     Problem: Falls - Risk of:  Goal: Will remain free from falls  Outcome: Met This Shift

## 2020-06-28 NOTE — PLAN OF CARE
Problem: Airway Clearance - Ineffective:  Goal: Ability to maintain a clear airway will improve  Description: Ability to maintain a clear airway will improve  Outcome: Met This Shift     Problem: Health Behavior:  Goal: Identification of resources available to assist in meeting health care needs will improve  Description: Identification of resources available to assist in meeting health care needs will improve  Outcome: Met This Shift

## 2020-06-29 VITALS
SYSTOLIC BLOOD PRESSURE: 148 MMHG | RESPIRATION RATE: 17 BRPM | HEIGHT: 74 IN | TEMPERATURE: 97.6 F | WEIGHT: 315 LBS | BODY MASS INDEX: 40.43 KG/M2 | OXYGEN SATURATION: 97 % | HEART RATE: 80 BPM | DIASTOLIC BLOOD PRESSURE: 88 MMHG

## 2020-06-29 LAB
CULTURE, BLOOD 2: NORMAL
METER GLUCOSE: 142 MG/DL (ref 74–99)
METER GLUCOSE: 197 MG/DL (ref 74–99)

## 2020-06-29 PROCEDURE — 6360000002 HC RX W HCPCS: Performed by: INTERNAL MEDICINE

## 2020-06-29 PROCEDURE — 6370000000 HC RX 637 (ALT 250 FOR IP): Performed by: INTERNAL MEDICINE

## 2020-06-29 PROCEDURE — 82962 GLUCOSE BLOOD TEST: CPT

## 2020-06-29 PROCEDURE — 2580000003 HC RX 258: Performed by: INTERNAL MEDICINE

## 2020-06-29 RX ORDER — PSEUDOEPHEDRINE HCL 30 MG
100 TABLET ORAL 2 TIMES DAILY
Qty: 60 CAPSULE | Refills: 0 | Status: SHIPPED | OUTPATIENT
Start: 2020-06-29 | End: 2020-07-29

## 2020-06-29 RX ORDER — SULFAMETHOXAZOLE AND TRIMETHOPRIM 800; 160 MG/1; MG/1
1 TABLET ORAL 2 TIMES DAILY
Qty: 10 TABLET | Refills: 0 | Status: SHIPPED | OUTPATIENT
Start: 2020-06-29 | End: 2020-07-04

## 2020-06-29 RX ADMIN — BETHANECHOL CHLORIDE 25 MG: 25 TABLET ORAL at 10:46

## 2020-06-29 RX ADMIN — CEFEPIME 1 G: 1 INJECTION, POWDER, FOR SOLUTION INTRAMUSCULAR; INTRAVENOUS at 02:01

## 2020-06-29 RX ADMIN — SODIUM CHLORIDE: 9 INJECTION, SOLUTION INTRAVENOUS at 06:01

## 2020-06-29 RX ADMIN — SODIUM CHLORIDE, PRESERVATIVE FREE 10 ML: 5 INJECTION INTRAVENOUS at 10:47

## 2020-06-29 RX ADMIN — MULTIPLE VITAMINS W/ MINERALS TAB 1 TABLET: TAB at 10:47

## 2020-06-29 RX ADMIN — INSULIN LISPRO 2 UNITS: 100 INJECTION, SOLUTION INTRAVENOUS; SUBCUTANEOUS at 11:01

## 2020-06-29 RX ADMIN — LISINOPRIL 5 MG: 5 TABLET ORAL at 10:46

## 2020-06-29 RX ADMIN — DOCUSATE SODIUM 100 MG: 100 CAPSULE, LIQUID FILLED ORAL at 10:46

## 2020-06-29 RX ADMIN — POLYETHYLENE GLYCOL 3350 17 G: 17 POWDER, FOR SOLUTION ORAL at 10:46

## 2020-06-29 RX ADMIN — Medication 500 MG: at 10:46

## 2020-06-29 ASSESSMENT — PAIN SCALES - GENERAL: PAINLEVEL_OUTOF10: 0

## 2020-06-29 NOTE — PLAN OF CARE
Problem: Discharge Planning:  Goal: Participates in care planning  Description: Participates in care planning  Outcome: Met This Shift  Goal: Discharged to appropriate level of care  Description: Discharged to appropriate level of care  Outcome: Met This Shift     Problem: Airway Clearance - Ineffective:  Goal: Ability to maintain a clear airway will improve  Description: Ability to maintain a clear airway will improve  6/29/2020 0052 by Nahomy Cope RN  Outcome: Met This Shift  6/28/2020 1716 by Faheem Armando RN  Outcome: Met This Shift     Problem:  Bowel Function - Altered:  Goal: Bowel elimination is within specified parameters  Description: Bowel elimination is within specified parameters  Outcome: Met This Shift     Problem: Cardiac Output - Decreased:  Goal: Hemodynamic stability will improve  Description: Hemodynamic stability will improve  6/29/2020 0052 by Nahomy Cope RN  Outcome: Met This Shift  6/28/2020 1716 by Faheem Armando RN  Outcome: Met This Shift     Problem: Cardiac:  Goal: Ability to maintain vital signs within normal range will improve  Description: Ability to maintain vital signs within normal range will improve  Outcome: Met This Shift  Goal: Cardiovascular alteration will improve  Description: Cardiovascular alteration will improve  Outcome: Met This Shift     Problem: Health Behavior:  Goal: Will modify at least one risk factor affecting health status  Description: Will modify at least one risk factor affecting health status  Outcome: Met This Shift  Goal: Identification of resources available to assist in meeting health care needs will improve  Description: Identification of resources available to assist in meeting health care needs will improve  Outcome: Met This Shift  Goal: Ability to identify and utilize available resources and services will improve  Description: Ability to identify and utilize available resources and services will improve  Outcome: Met This Shift  Goal: Ability to manage health-related needs will improve  Description: Ability to manage health-related needs will improve  Outcome: Met This Shift     Problem: Physical Regulation:  Goal: Complications related to the disease process, condition or treatment will be avoided or minimized  Description: Complications related to the disease process, condition or treatment will be avoided or minimized  Outcome: Met This Shift  Goal: Diagnostic test results will improve  Description: Diagnostic test results will improve  Outcome: Met This Shift     Problem:  Activity:  Goal: Risk for activity intolerance will decrease  Description: Risk for activity intolerance will decrease  Outcome: Met This Shift     Problem: Coping:  Goal: Ability to adjust to condition or change in health will improve  Description: Ability to adjust to condition or change in health will improve  Outcome: Met This Shift     Problem: Fluid Volume:  Goal: Ability to maintain a balanced intake and output will improve  Description: Ability to maintain a balanced intake and output will improve  Outcome: Met This Shift     Problem: Metabolic:  Goal: Ability to maintain appropriate glucose levels will improve  Description: Ability to maintain appropriate glucose levels will improve  Outcome: Met This Shift     Problem: Nutritional:  Goal: Maintenance of adequate nutrition will improve  Description: Maintenance of adequate nutrition will improve  Outcome: Met This Shift  Goal: Progress toward achieving an optimal weight will improve  Description: Progress toward achieving an optimal weight will improve  Outcome: Met This Shift     Problem: Skin Integrity:  Goal: Risk for impaired skin integrity will decrease  Description: Risk for impaired skin integrity will decrease  Outcome: Met This Shift     Problem: Tissue Perfusion:  Goal: Adequacy of tissue perfusion will improve  Description: Adequacy of tissue perfusion will improve  Outcome: Met This Shift     Problem: Activity Intolerance:  Goal: Ability to perform activities of daily living will improve  Description: Ability to perform activities of daily living will improve  Outcome: Met This Shift     Problem: Gas Exchange - Impaired:  Goal: Levels of oxygenation will improve  Description: Levels of oxygenation will improve  6/29/2020 0052 by Elvira Reyes RN  Outcome: Met This Shift  6/28/2020 1716 by Meliton Lancaster RN  Outcome: Met This Shift     Problem: Mood - Altered:  Goal: Emotional status will stabilize  Description: Emotional status will stabilize  Outcome: Met This Shift     Problem: Tobacco Use:  Goal: Will participate in inpatient tobacco-use cessation counseling  Description: Will participate in inpatient tobacco-use cessation counseling  Outcome: Met This Shift     Problem: Falls - Risk of:  Goal: Will remain free from falls  Description: Will remain free from falls  6/29/2020 0052 by Elvira Reyes RN  Outcome: Met This Shift  6/28/2020 1716 by Meliton Lancaster RN  Outcome: Met This Shift  Goal: Absence of physical injury  Description: Absence of physical injury  Outcome: Met This Shift

## 2020-06-29 NOTE — DISCHARGE INSTR - DIET

## 2020-06-30 ENCOUNTER — CARE COORDINATION (OUTPATIENT)
Dept: CASE MANAGEMENT | Age: 70
End: 2020-06-30

## 2020-06-30 LAB
BLOOD CULTURE, ROUTINE: ABNORMAL
ORGANISM: ABNORMAL

## 2020-06-30 NOTE — CARE COORDINATION
for GetWell Loop and agrees to enroll yes  Patient's preferred e-mail: [email or web address]   Patient's preferred phone number: [phone]  Based on Loop alert triggers, patient will be contacted by nurse care manager for worsening symptoms. Spoke with patient today 6/30/20 for hospital discharge follow up/COVID-19 risk. Confirmed patient was admitted to [address] [place] [address] for [address] Patient states he is feeling \"much better\" and \"much stronger\" since discharge. Confirmed with patient he recently had a TURP earlier this month and developed fever, chills, and body aches and placed on Cipro per Urology PTA. Patient states he did fail voiding trial and carney catheter was put back in and discharged home with catheter which is draining clear yellow urine. Denies any penis, pubic, flank or back pain. Denies any shortness of breath, chest pain, nausea, vomiting, chills or fever. Noted patient was negative for COVID-19 on 6/1/20. Confirmed with patient he obtained all new medications ordered on discharge and is taking as directed. Advised to take Bactrim as prescribed until completely finished which patient acknowledges. Discussed bleeding precautions with patient associated with Xarelto and knowing when to seek immediate medical attention. States BS today at home was \"in the 190's\". Noted Hgb A1C on 6/26/20 was 8.4. Confirmed patient is following a low sugar/low carbohydrate diet. States he will discuss oral anti-diabteic medication with PCP tomorrow. Denies any needs or concerns at this time. Patient enrolled in Get Well Loop Program for further monitoring. Pt will be further monitored by COVID Loop Team based on severity of symptoms and risk factors.     ARLYN Peralta

## 2020-07-13 ENCOUNTER — HOSPITAL ENCOUNTER (EMERGENCY)
Age: 70
Discharge: HOME | End: 2020-07-14
Attending: EMERGENCY MEDICINE
Payer: MEDICARE

## 2020-07-13 LAB
ANION GAP SERPL CALCULATED.3IONS-SCNC: 13 MMOL/L (ref 7–16)
BACTERIA: ABNORMAL /HPF
BILIRUBIN URINE: NEGATIVE
BLOOD, URINE: ABNORMAL
BUN BLDV-MCNC: 19 MG/DL (ref 8–23)
CALCIUM SERPL-MCNC: 8.9 MG/DL (ref 8.6–10.2)
CHLORIDE BLD-SCNC: 99 MMOL/L (ref 98–107)
CLARITY: ABNORMAL
CO2: 25 MMOL/L (ref 22–29)
COLOR: YELLOW
CREAT SERPL-MCNC: 1.1 MG/DL (ref 0.7–1.2)
EPITHELIAL CELLS, UA: ABNORMAL /HPF
GFR AFRICAN AMERICAN: >60
GFR NON-AFRICAN AMERICAN: >60 ML/MIN/1.73
GLUCOSE BLD-MCNC: 126 MG/DL (ref 74–99)
GLUCOSE URINE: >=1000 MG/DL
KETONES, URINE: ABNORMAL MG/DL
LEUKOCYTE ESTERASE, URINE: NEGATIVE
NITRITE, URINE: NEGATIVE
PH UA: 5.5 (ref 5–9)
POTASSIUM SERPL-SCNC: 3.4 MMOL/L (ref 3.5–5)
PROTEIN UA: 30 MG/DL
RBC UA: >20 /HPF (ref 0–2)
SODIUM BLD-SCNC: 137 MMOL/L (ref 132–146)
SPECIFIC GRAVITY UA: 1.02 (ref 1–1.03)
UROBILINOGEN, URINE: 0.2 E.U./DL
WBC UA: ABNORMAL /HPF (ref 0–5)

## 2020-07-13 PROCEDURE — 99283 EMERGENCY DEPT VISIT LOW MDM: CPT

## 2020-07-13 PROCEDURE — 81001 URINALYSIS AUTO W/SCOPE: CPT

## 2020-07-13 PROCEDURE — 80048 BASIC METABOLIC PNL TOTAL CA: CPT

## 2020-07-13 PROCEDURE — 87088 URINE BACTERIA CULTURE: CPT

## 2020-07-13 PROCEDURE — 6370000000 HC RX 637 (ALT 250 FOR IP): Performed by: EMERGENCY MEDICINE

## 2020-07-13 RX ORDER — CIPROFLOXACIN 500 MG/1
500 TABLET, FILM COATED ORAL 2 TIMES DAILY
Qty: 10 TABLET | Refills: 0 | Status: SHIPPED | OUTPATIENT
Start: 2020-07-13 | End: 2020-07-18

## 2020-07-13 RX ORDER — CIPROFLOXACIN 500 MG/1
500 TABLET, FILM COATED ORAL ONCE
Status: COMPLETED | OUTPATIENT
Start: 2020-07-13 | End: 2020-07-14

## 2020-07-13 RX ORDER — EMPAGLIFLOZIN 10 MG/1
25 TABLET, FILM COATED ORAL DAILY
COMMUNITY

## 2020-07-13 RX ORDER — LIDOCAINE HYDROCHLORIDE 20 MG/ML
JELLY TOPICAL PRN
Status: DISCONTINUED | OUTPATIENT
Start: 2020-07-13 | End: 2020-07-14 | Stop reason: HOSPADM

## 2020-07-13 RX ADMIN — LIDOCAINE HYDROCHLORIDE: 20 JELLY TOPICAL at 22:35

## 2020-07-13 ASSESSMENT — ENCOUNTER SYMPTOMS
EYE DISCHARGE: 0
SORE THROAT: 0
COUGH: 0
EYE PAIN: 0
WHEEZING: 0
NAUSEA: 0
VOMITING: 0
DIARRHEA: 0
BACK PAIN: 0
SINUS PRESSURE: 0
EYE REDNESS: 0
ABDOMINAL PAIN: 1
SHORTNESS OF BREATH: 0

## 2020-07-13 ASSESSMENT — PAIN DESCRIPTION - DESCRIPTORS: DESCRIPTORS: PRESSURE

## 2020-07-13 ASSESSMENT — PAIN SCALES - GENERAL: PAINLEVEL_OUTOF10: 5

## 2020-07-13 ASSESSMENT — PAIN DESCRIPTION - ORIENTATION: ORIENTATION: LOWER

## 2020-07-13 ASSESSMENT — PAIN DESCRIPTION - LOCATION: LOCATION: ABDOMEN

## 2020-07-14 VITALS
BODY MASS INDEX: 40.43 KG/M2 | HEART RATE: 75 BPM | WEIGHT: 315 LBS | RESPIRATION RATE: 16 BRPM | TEMPERATURE: 96.8 F | HEIGHT: 74 IN | SYSTOLIC BLOOD PRESSURE: 176 MMHG | OXYGEN SATURATION: 95 % | DIASTOLIC BLOOD PRESSURE: 83 MMHG

## 2020-07-14 PROCEDURE — 6370000000 HC RX 637 (ALT 250 FOR IP): Performed by: EMERGENCY MEDICINE

## 2020-07-14 RX ADMIN — CIPROFLOXACIN HYDROCHLORIDE 500 MG: 500 TABLET, FILM COATED ORAL at 00:19

## 2020-07-14 NOTE — ED PROVIDER NOTES
Patient is a 82 y/o male who presents to the ED with urinary retention. Patient states that he underwent a TURP last month. He was seen by his urologist today and had his carney catheter removed. He has since had difficulty urinating and increasing suprapubic pressure. He denies any fever. Review of Systems   Constitutional: Negative for chills and fever. HENT: Negative for ear pain, sinus pressure and sore throat. Eyes: Negative for pain, discharge and redness. Respiratory: Negative for cough, shortness of breath and wheezing. Cardiovascular: Negative for chest pain. Gastrointestinal: Positive for abdominal pain. Negative for diarrhea, nausea and vomiting. Genitourinary: Positive for decreased urine volume and difficulty urinating. Negative for dysuria and frequency. Musculoskeletal: Negative for arthralgias and back pain. Skin: Negative for rash and wound. Neurological: Negative for weakness and headaches. Hematological: Negative for adenopathy. All other systems reviewed and are negative. Physical Exam  Vitals signs and nursing note reviewed. Constitutional:       General: He is not in acute distress. Appearance: He is obese. HENT:      Head: Normocephalic and atraumatic. Right Ear: External ear normal.      Left Ear: External ear normal.      Nose: Nose normal.      Mouth/Throat:      Mouth: Mucous membranes are moist.   Eyes:      Conjunctiva/sclera: Conjunctivae normal.      Pupils: Pupils are equal, round, and reactive to light. Neck:      Musculoskeletal: Normal range of motion and neck supple. Cardiovascular:      Rate and Rhythm: Normal rate and regular rhythm. Heart sounds: No murmur. Pulmonary:      Effort: Pulmonary effort is normal. No respiratory distress. Breath sounds: Normal breath sounds. No stridor. No wheezing, rhonchi or rales. Abdominal:      General: Bowel sounds are normal. There is no distension.       Palpations: Abdomen is soft. Tenderness: There is no abdominal tenderness. There is no guarding. Musculoskeletal: Normal range of motion. General: No swelling. Skin:     General: Skin is warm and dry. Findings: No rash. Neurological:      Mental Status: He is alert and oriented to person, place, and time. Procedures     MDM         Time: 1267. Re-evaluation. Patients symptoms are improving  Repeat physical examination is not changed  Patient is feeling much better following carney catheter insertion.         --------------------------------------------- PAST HISTORY ---------------------------------------------  Past Medical History:  has a past medical history of Arthritis, Asthma, Diabetes mellitus ([facility] [address] H/O cardiovascular stress test, Hypertension, and Sleep apnea. Past Surgical History:  has a past surgical history that includes Cholecystectomy (1995); Tonsillectomy; TURP (08/04/2017); and TURP (N/A, 6/5/2020). Social History:  reports that he has never smoked. He has never used smokeless tobacco. He reports that he does not drink alcohol or use drugs. Family History: family history includes Cancer in his brother and mother; Heart Disease in his father. The patients home medications have been reviewed.     Allergies: Penicillins    -------------------------------------------------- RESULTS -------------------------------------------------  Labs:  Results for orders placed or performed during the hospital encounter of 83/49/60   Basic Metabolic Panel   Result Value Ref Range    Sodium 137 132 - 146 mmol/L    Potassium 3.4 (L) 3.5 - 5.0 mmol/L    Chloride 99 98 - 107 mmol/L    CO2 25 22 - 29 mmol/L    Anion Gap 13 7 - 16 mmol/L    Glucose 126 (H) 74 - 99 mg/dL    BUN 19 8 - 23 mg/dL    CREATININE 1.1 0.7 - 1.2 mg/dL    GFR Non-African American >60 >=60 mL/min/1.73    GFR African American >60     Calcium 8.9 8.6 - 10.2 mg/dL   Urinalysis   Result Value Ref Range    Color, UA Yellow Straw/Yellow    Clarity, UA CLOUDY (A) Clear    Glucose, Ur >=1000 (A) Negative mg/dL    Bilirubin Urine Negative Negative    Ketones, Urine TRACE (A) Negative mg/dL    Specific Gravity, UA 1.020 1.005 - 1.030    Blood, Urine LARGE (A) Negative    pH, UA 5.5 5.0 - 9.0    Protein, UA 30 (A) Negative mg/dL    Urobilinogen, Urine 0.2 <2.0 E.U./dL    Nitrite, Urine Negative Negative    Leukocyte Esterase, Urine Negative Negative   Microscopic Urinalysis   Result Value Ref Range    WBC, UA 5-10 (A) 0 - 5 /HPF    RBC, UA >20 0 - 2 /HPF    Epithelial Cells, UA RARE /HPF    Bacteria, UA FEW (A) None Seen /HPF       Radiology:  No orders to display       ------------------------- NURSING NOTES AND VITALS REVIEWED ---------------------------  Date / Time Roomed:  7/13/2020 10:06 PM  ED Bed Assignment:  19/19    The nursing notes within the ED encounter and vital signs as below have been reviewed. /85   Pulse 89   Temp 96.8 °F (36 °C) (Infrared)   Resp 18   Ht 6' 2\" (1.88 m)   Wt (!) 330 lb (149.7 kg)   SpO2 96%   BMI 42.37 kg/m²   Oxygen Saturation Interpretation: Normal      ------------------------------------------ PROGRESS NOTES ------------------------------------------  I have spoken with the patient and discussed todays results, in addition to providing specific details for the plan of care and counseling regarding the diagnosis and prognosis. Their questions are answered at this time and they are agreeable with the plan. I discussed at length with them reasons for immediate return here for re evaluation. They will followup with primary care by calling their office tomorrow. --------------------------------- ADDITIONAL PROVIDER NOTES ---------------------------------  At this time the patient is without objective evidence of an acute process requiring hospitalization or inpatient management.   They have remained hemodynamically stable throughout their entire ED visit and are stable for discharge with outpatient follow-up. The plan has been discussed in detail and they are aware of the specific conditions for emergent return, as well as the importance of follow-up. New Prescriptions    CIPROFLOXACIN (CIPRO) 500 MG TABLET    Take 1 tablet by mouth 2 times daily for 5 days       Diagnosis:  1. Acute urinary retention    2. Urinary tract infection associated with indwelling urethral catheter, initial encounter [facility])        Disposition:  Patient's disposition: Discharge to home  Patient's condition is stable.                [address] [facility],   07/13/20 1915

## 2020-07-16 LAB — URINE CULTURE, ROUTINE: NORMAL

## 2020-07-17 ENCOUNTER — OFFICE VISIT (OUTPATIENT)
Dept: CARDIOLOGY CLINIC | Age: 70
End: 2020-07-17
Payer: MEDICARE

## 2020-07-17 VITALS
DIASTOLIC BLOOD PRESSURE: 74 MMHG | OXYGEN SATURATION: 96 % | WEIGHT: 315 LBS | RESPIRATION RATE: 20 BRPM | SYSTOLIC BLOOD PRESSURE: 132 MMHG | BODY MASS INDEX: 40.43 KG/M2 | HEART RATE: 87 BPM | HEIGHT: 74 IN

## 2020-07-17 PROCEDURE — G8427 DOCREV CUR MEDS BY ELIG CLIN: HCPCS | Performed by: INTERNAL MEDICINE

## 2020-07-17 PROCEDURE — G8417 CALC BMI ABV UP PARAM F/U: HCPCS | Performed by: INTERNAL MEDICINE

## 2020-07-17 PROCEDURE — 3017F COLORECTAL CA SCREEN DOC REV: CPT | Performed by: INTERNAL MEDICINE

## 2020-07-17 PROCEDURE — 1111F DSCHRG MED/CURRENT MED MERGE: CPT | Performed by: INTERNAL MEDICINE

## 2020-07-17 PROCEDURE — 93000 ELECTROCARDIOGRAM COMPLETE: CPT | Performed by: INTERNAL MEDICINE

## 2020-07-17 PROCEDURE — 99215 OFFICE O/P EST HI 40 MIN: CPT | Performed by: INTERNAL MEDICINE

## 2020-07-17 PROCEDURE — 4040F PNEUMOC VAC/ADMIN/RCVD: CPT | Performed by: INTERNAL MEDICINE

## 2020-07-17 PROCEDURE — 1036F TOBACCO NON-USER: CPT | Performed by: INTERNAL MEDICINE

## 2020-07-17 PROCEDURE — 1123F ACP DISCUSS/DSCN MKR DOCD: CPT | Performed by: INTERNAL MEDICINE

## 2020-07-17 RX ORDER — TAMSULOSIN HYDROCHLORIDE 0.4 MG/1
0.4 CAPSULE ORAL 2 TIMES DAILY
COMMUNITY
End: 2020-10-02 | Stop reason: ALTCHOICE

## 2020-07-17 RX ORDER — LISINOPRIL AND HYDROCHLOROTHIAZIDE 20; 12.5 MG/1; MG/1
2 TABLET ORAL EVERY MORNING
Status: ON HOLD | COMMUNITY
End: 2022-03-25 | Stop reason: HOSPADM

## 2020-07-17 NOTE — PROGRESS NOTES
Chief Complaint   Patient presents with    Atrial Fibrillation    Hypertension       Patient Active Problem List    Diagnosis Date Noted    Paroxysmal atrial fibrillation ([facility]     Essential hypertension     Morbid obesity ([facility]     COVID-19 virus infection 06/24/2020    Sepsis ([facility] 06/24/2020       Current Outpatient Medications   Medication Sig Dispense Refill    lisinopril-hydroCHLOROthiazide (PRINZIDE;ZESTORETIC) 20-12.5 MG per tablet Take 2 tablets by mouth every morning      tamsulosin (FLOMAX) 0.4 MG capsule Take 0.4 mg by mouth 2 times daily      empagliflozin (JARDIANCE) 10 MG tablet Take 10 mg by mouth daily      ciprofloxacin (CIPRO) 500 MG tablet Take 1 tablet by mouth 2 times daily for 5 days 10 tablet 0    docusate sodium (COLACE, DULCOLAX) 100 MG CAPS Take 100 mg by mouth 2 times daily (Patient taking differently: Take 100 mg by mouth daily as needed ) 60 capsule 0    rivaroxaban (XARELTO) 20 MG TABS tablet Take 1 tablet by mouth daily 30 tablet 0    albuterol sulfate HFA (VENTOLIN HFA) 108 (90 Base) MCG/ACT inhaler Inhale 2 puffs into the lungs every 4 hours as needed for Wheezing or Shortness of Breath      bethanechol (URECHOLINE) 25 MG tablet Take 25 mg by mouth 3 times daily      Multiple Vitamins-Minerals (CENTRUM/CERTA-ANABEL WITH MINERALS ORAL) solution Take 15 mLs by mouth daily      sodium chloride (OCEAN, BABY AYR) 0.65 % nasal spray 1 spray by Nasal route as needed for Congestion      oxymetazoline (AFRIN) 0.05 % nasal spray 2 sprays by Nasal route 2 times daily      terazosin (HYTRIN) 1 MG capsule Take 1 mg by mouth nightly      diphenhydrAMINE (BENADRYL) 25 MG tablet Take 50 mg by mouth every 6 hours as needed for Itching       No current facility-administered medications for this visit.          Allergies   Allergen Reactions    Penicillins        Vitals:    07/17/20 1038   BP: 132/74   Site: Right Upper Arm   Position: Sitting   Cuff Size: Large Adult   Pulse: 87 Resp: 20   SpO2: 96%   Weight: (!) 331 lb (150.1 kg)   Height: 6' 2\" (1.88 m)       Social History     Socioeconomic History    Marital status:      Spouse name: Not on file    Number of children: Not on file    Years of education: Not on file    Highest education level: Not on file   Occupational History    Not on file   Social Needs    Financial resource strain: Not on file    Food insecurity     Worry: Not on file     Inability: Not on file    Transportation needs     Medical: Not on file     Non-medical: Not on file   Tobacco Use    Smoking status: Never Smoker    Smokeless tobacco: Never Used   Substance and Sexual Activity    Alcohol use: Not Currently    Drug use: Never    Sexual activity: Not on file   Lifestyle    Physical activity     Days per week: Not on file     Minutes per session: Not on file    Stress: Not on file   Relationships    Social connections     Talks on phone: Not on file     Gets together: Not on file     Attends [employer]ism service: Not on file     Active member of club or organization: Not on file     Attends meetings of clubs or organizations: Not on file     Relationship status: Not on file    Intimate partner violence     Fear of current or ex partner: Not on file     Emotionally abused: Not on file     Physically abused: Not on file     Forced sexual activity: Not on file   Other Topics Concern    Not on file   Social History Narrative    Denies caffeine. Family History   Problem Relation Age of Onset    Cancer Mother         thyroid    Heart Disease Father     Cancer Brother         prostate    No Known Problems Brother          SUBJECTIVE: Hillary Pepper presents to the office today for re-evaluation of cardiac diagnoses and hfu.  DR. Sadie Lewis PATIENT. Was hospitalized for PAF - spontaneous converison to sinus. I reviewd hosp records. Placed on NOAC. Had benign stress and echo .   He complains of weakness that is residual since admit earlier this year for urinary sepsis and denies   dyspnea, orthopnea, palpitations, paroxysmal nocturnal dyspnea and syncope. Compliant with NOAC - no bleeds or neuro events. No beta blocker or AAD prescribed. Placed on jardiance for Hgb A1c of 8.4. May be facing uro procedure. Compliant with CPAP        Review of Systems:   Heart: as above   Lungs: as above   Eyes: denies changes in vision or discharge. Ears: denies changes in hearing or pain. Nose: denies epistaxis or masses   Throat: denies sore throat or trouble swallowing. Neuro: denies numbness, tingling, tremors. Skin: denies rashes or itching. : denies hematuria, dysuria   GI: denies vomiting, diarrhea   Psych: denies mood changed, anxiety, depression. all others negative. Physical Exam   /74 (Site: Right Upper Arm, Position: Sitting, Cuff Size: Large Adult)   Pulse 87   Resp 20   Ht 6' 2\" (1.88 m)   Wt (!) 331 lb (150.1 kg)   SpO2 96%   BMI 42.50 kg/m²   Constitutional: Oriented to person, place, and time. Obese. No distress. Head: Normocephalic and atraumatic. Eyes: EOM are normal. Pupils are equal, round, and reactive to light. Neck: Normal range of motion. Neck supple. No hepatojugular reflux and no JVD present. Carotid bruit is not present. No tracheal deviation present. No thyromegaly present. Cardiovascular: Normal rate, regular rhythm, normal heart sounds and intact distal pulses. Exam reveals no gallop and no friction rub. No murmur heard. Pulmonary/Chest: Effort normal and breath sounds normal. No respiratory distress. No wheezes. No rales. No tenderness. Abdominal: Soft. Bowel sounds are normal. No distension and no mass. No tenderness. No rebound and no guarding. Musculoskeletal:No edema and no tenderness. Lymphadenopathy:   No cervical adenopathy. No groin adenopathy. Neurological: Alert and oriented to person, place, and time. Skin: Skin is warm and dry. No rash noted. Not diaphoretic. No erythema. Psychiatric: Normal mood and affect. Behavior is normal.     EKG:  normal sinus rhythm, RBBB, left axis deviation, unchanged from previous tracings. ASSESSMENT AND PLAN:  Patient Active Problem List   Diagnosis    COVID-19 virus infection    Sepsis ([address]    Paroxysmal atrial fibrillation (HCC)    Essential hypertension    Morbid obesity (HCC)     PAF - in sinus today. On NOAC for CHADS-VASC=3  Reviewed diagnosis and therapy including risk of severe bleed with anticoagulation  Compliance with CPAP stressed  Lifestyle alterations also discussed: abstinence from alcohol, diet and exercise program   OK for uro surgery at low risk - hold Xarelto 48 hours before, restart ASAP         The patient was educated as to the symptoms that would require a prompt return to our office. Return visit in 3 months with DR. Negrito García M.D  [facility]

## 2020-07-20 NOTE — DISCHARGE SUMMARY
Discharge Summary    Laisha Lennon  :  1950  MRN:  [id number]    Admit date:  2020  Discharge date:  2020    Admitting Physician:  Misti Christian MD    Discharge Diagnoses:    Sepsis [facility]) secondary to urinary procedure  Urinary tract infection  Hypokalemia  Hypocalcemia  Urinary retention  Morbid obesity  BMI >43    Consults:  Urology, Cardiology    HPI/Hospital Course: This is a 81year old male patient that was admitted with reports of fever, chills, dysuria, and body aches for past week. He had recently had a TURP done on 2020. Status post procedure he developed urinary retention and had a carney placed. He failed voiding trial and had to have carney placed again. He was getting treated for a urinary tract infection. The laboratory studies on admission show a low potassium and calcium level. His urine was positive for infection and blood. He was treated for sepsis secondary to a urinary procedure and urinary tract infection with IV antibiotics. The pathology from the TURP showed benign prostate tissue without evidence of malignancy. The urine culture on 2020 was growing E. coli. Over the course of the stay he continued to show improvement. He was feeling better and only stated that he was fatigued which was improving. He was seen and examined and offered no new complaints. He was discharged to home in stable condition.     Discharge Medications:        Medication List      START taking these medications    docusate 100 MG Caps  Commonly known as:  COLACE, DULCOLAX  Take 100 mg by mouth 2 times daily     rivaroxaban 20 MG Tabs tablet  Commonly known as:  XARELTO  Take 1 tablet by mouth daily        CONTINUE taking these medications    Benadryl 25 MG tablet  Generic drug:  diphenhydrAMINE     bethanechol 25 MG tablet  Commonly known as:  URECHOLINE     CENTRUM/CERTA-ANABEL with minerals oral solution     oxymetazoline 0.05 % nasal spray  Commonly known as:  AFRIN     sodium chloride 0.65 % nasal spray  Commonly known as:  OCEAN, BABY AYR     terazosin 1 MG capsule  Commonly known as:  HYTRIN     Ventolin  (90 Base) MCG/ACT inhaler  Generic drug:  albuterol sulfate HFA        STOP taking these medications    ciprofloxacin 500 MG tablet  Commonly known as:  CIPRO     GLUCOSAMINE CHOND COMPLEX/MSM PO     tamsulosin 0.4 MG capsule  Commonly known as:  FLOMAX     Therapeutic Tabs tablet     vitamin C 500 MG tablet  Commonly known as:  ASCORBIC ACID        ASK your doctor about these medications    sulfamethoxazole-trimethoprim 800-160 MG per tablet  Commonly known as:  BACTRIM DS;SEPTRA DS  Take 1 tablet by mouth 2 times daily for 5 days  Ask about: Should I take this medication? Where to Get Your Medications      These medications were sent to [address] [place] [address],  [address], [address]  [address] [place] [address], [address] [place] [address] Avenue [address]    Phone:  [phone]   · docusate 100 MG Caps  · rivaroxaban 20 MG Tabs tablet  · sulfamethoxazole-trimethoprim 800-160 MG per tablet         Disposition:  Home    Follow-up with his primary in 5-7 days.     Note that over 30 minutes was spent in preparing discharge papers, discussing discharge with patient, medication review, etc.      Signed:  Kylah Meraz  7/20/2020, 9:46 AM

## 2020-10-02 RX ORDER — TERAZOSIN 10 MG/1
CAPSULE ORAL NIGHTLY
Status: ON HOLD | COMMUNITY
Start: 2020-07-02 | End: 2022-03-25 | Stop reason: HOSPADM

## 2020-10-02 NOTE — PROGRESS NOTES
Magda 38 PRE-ADMISSION TESTING GENERAL INSTRUCTIONS- [facility]-phone number:[phone]    GENERAL INSTRUCTIONS  [x] Antibacterial Soap shower Night before and/or AM of Surgery  [] Theo wipe instruction sheet and wipes given. [x] Nothing by mouth after midnight, including gum, candy, mints, or water. [x] You may brush your teeth, gargle, but do NOT swallow water. []Hibiclens shower  the night before and the morning of surgery. Do not use             Hibiclens on your face or head. [x]No smoking, chewing tobacco, illegal drugs, or alcohol within 24 hours of your surgery. [x] Jewelry, valuables or body piercing's should not be brought to the hospital. All body and/or tongue piercing's must be removed prior to arriving to hospital.  ALL hair pins must be removed. [x] Do not wear makeup, lotions, powders, deodorant. Nail polish as directed by the nurse. [x] Arrange transportation with a responsible adult  to and from the hospital. If you do not have a responsible adult  to transport you, you will need to make arrangements with a medical transportation company (i.e. [employer]. A Uber/taxi/bus is not appropriate unless you are accompanied by a responsible adult ). Arrange for someone to be with you for the remainder of the day and for 24 hours after your procedure due to having had anesthesia. Who will be your  for transportation?______________WIFE____   Who will be staying with you for 24 hrs after your procedure?_______WIFE___________  [x] Bring insurance card and photo ID.  [] Transfusion Bracelet: Please bring with you to hospital, day of surgery  [] Bring urine specimen day of surgery. Any small container is acceptable. [x] Use inhalers the morning of surgery and bring with you to hospital.  [] Bring copy of living will or healthcare power of  papers to be placed in your electronic record.   [] CPAP/BI-PAP: Please bring your machine if you are to spend the night in the hospital.     PARKING INSTRUCTIONS:   [x] Arrival Time:____0900_________  · [] Parking lot '\"I\"  is located on [facility] (the corner of [facility] and [facility]). To enter, press the button and the gate will lift. A free token will be provided to exit the lot. One car per patient is allowed to park in this lot. All other cars are to park on [address] [place] [address] either in the parking garage or the handicap lot. [x] To reach the [facility] lobby from [address] [place] [address], upon entering the hospital, take elevator B to the 3rd floor. EDUCATION INSTRUCTIONS:      [] Knee or hip replacement booklet & exercise pamphlets given. [] Yony 77 placed in chart. [] Pre-admission Testing educational folder given  [] Incentive Spirometry,coughing & deep breathing exercises reviewed. []Medication information sheet(s)   []Fluoroscopy-Xray used in surgery reviewed with patient. Educational pamphlet placed in chart. [x]Pain: Post-op pain is normal and to be expected. You will be asked to rate your pain from 0-10(a zero is not acceptable-education is needed). Your post-op pain goal is:5  [] Ask your nurse for your pain medication. [] Joint camp offered. [] Joint replacement booklets given. [] Other:___________________________    MEDICATION INSTRUCTIONS:   [x]Bring a complete list of your medications, please write the last time you took the medicine, give this list to the nurse. [x] Take the following medications the morning of surgery with 1-2 ounces of water:   [x] Stop herbal supplements and vitamins 5 days before your surgery. [] DO NOT take any diabetic medicine the morning of surgery. Follow instructions for insulin the day before surgery. [x] If you are diabetic and your blood sugar is low or you feel symptomatic, you may drink 1-2 ounces of apple juice or take a glucose tablet.   The morning of your procedure, you may call the pre-op area if you have concerns about your blood sugar [phone]. [] Use your inhalers the morning of surgery. Bring your emergency inhaler with you day of surgery. [] Follow physician instructions regarding any blood thinners you may be taking. WHAT TO EXPECT:  [x] The day of surgery you will be greeted and checked in by the [facility].  In addition, you will be registered in the [facility] by a Patient Access Representative. Please bring your photo ID and insurance card. A nurse will greet you in accordance to the time you are needed in the pre-op area to prepare you for surgery. Please do not be discouraged if you are not greeted in the order you arrive as there are many variables that are involved in patient preparation. Your patience is greatly appreciated as you wait for your nurse. Please bring in items such as: books, magazines, newspapers, electronics, or any other items  to occupy your time in the waiting area. [x]  Delays may occur with surgery and staff will make a sincere effort to keep you informed of delays. If any delays occur with your procedure, we apologize ahead of time for your inconvenience as we recognize the value of your time.

## 2020-10-07 ENCOUNTER — HOSPITAL ENCOUNTER (OUTPATIENT)
Age: 70
Discharge: HOME | End: 2020-10-09
Payer: MEDICARE

## 2020-10-08 ENCOUNTER — PREP FOR PROCEDURE (OUTPATIENT)
Dept: UROLOGY | Age: 70
End: 2020-10-08

## 2020-10-08 DIAGNOSIS — N32.3 DIVERTICULA, BLADDER: Primary | ICD-10-CM

## 2020-10-08 PROCEDURE — U0003 INFECTIOUS AGENT DETECTION BY NUCLEIC ACID (DNA OR RNA); SEVERE ACUTE RESPIRATORY SYNDROME CORONAVIRUS 2 (SARS-COV-2) (CORONAVIRUS DISEASE [COVID-19]), AMPLIFIED PROBE TECHNIQUE, MAKING USE OF HIGH THROUGHPUT TECHNOLOGIES AS DESCRIBED BY CMS-2020-01-R: HCPCS

## 2020-10-08 RX ORDER — SODIUM CHLORIDE 9 MG/ML
INJECTION, SOLUTION INTRAVENOUS CONTINUOUS
Status: CANCELLED | OUTPATIENT
Start: 2020-10-08

## 2020-10-08 RX ORDER — SODIUM CHLORIDE 0.9 % (FLUSH) 0.9 %
10 SYRINGE (ML) INJECTION PRN
Status: CANCELLED | OUTPATIENT
Start: 2020-10-08

## 2020-10-08 RX ORDER — SODIUM CHLORIDE 0.9 % (FLUSH) 0.9 %
10 SYRINGE (ML) INJECTION EVERY 12 HOURS SCHEDULED
Status: CANCELLED | OUTPATIENT
Start: 2020-10-08

## 2020-10-08 RX ORDER — CIPROFLOXACIN 2 MG/ML
400 INJECTION, SOLUTION INTRAVENOUS
Status: CANCELLED | OUTPATIENT
Start: 2020-10-08 | End: 2020-10-08

## 2020-10-10 LAB
SARS-COV-2: NOT DETECTED
SOURCE: NORMAL

## 2020-10-12 ENCOUNTER — ANESTHESIA (OUTPATIENT)
Dept: OPERATING ROOM | Age: 70
DRG: 654 | End: 2020-10-12
Payer: MEDICARE

## 2020-10-12 ENCOUNTER — ANESTHESIA EVENT (OUTPATIENT)
Dept: OPERATING ROOM | Age: 70
DRG: 654 | End: 2020-10-12
Payer: MEDICARE

## 2020-10-12 ENCOUNTER — HOSPITAL ENCOUNTER (INPATIENT)
Age: 70
LOS: 1 days | Discharge: HOME | DRG: 654 | End: 2020-10-13
Attending: UROLOGY | Admitting: UROLOGY
Payer: MEDICARE

## 2020-10-12 VITALS
SYSTOLIC BLOOD PRESSURE: 127 MMHG | OXYGEN SATURATION: 94 % | RESPIRATION RATE: 6 BRPM | DIASTOLIC BLOOD PRESSURE: 80 MMHG

## 2020-10-12 PROBLEM — N32.3 BLADDER DIVERTICULUM: Status: ACTIVE | Noted: 2020-10-12

## 2020-10-12 LAB
ANION GAP SERPL CALCULATED.3IONS-SCNC: 14 MMOL/L (ref 7–16)
BUN BLDV-MCNC: 16 MG/DL (ref 8–23)
CALCIUM SERPL-MCNC: 9.3 MG/DL (ref 8.6–10.2)
CHLORIDE BLD-SCNC: 101 MMOL/L (ref 98–107)
CO2: 26 MMOL/L (ref 22–29)
CREAT SERPL-MCNC: 1 MG/DL (ref 0.7–1.2)
GFR AFRICAN AMERICAN: >60
GFR NON-AFRICAN AMERICAN: >60 ML/MIN/1.73
GLUCOSE BLD-MCNC: 146 MG/DL (ref 74–99)
HCT VFR BLD CALC: 43.2 % (ref 37–54)
HEMOGLOBIN: 14 G/DL (ref 12.5–16.5)
MCH RBC QN AUTO: 27.7 PG (ref 26–35)
MCHC RBC AUTO-ENTMCNC: 32.4 % (ref 32–34.5)
MCV RBC AUTO: 85.4 FL (ref 80–99.9)
METER GLUCOSE: 142 MG/DL (ref 74–99)
PDW BLD-RTO: 14.5 FL (ref 11.5–15)
PLATELET # BLD: 168 E9/L (ref 130–450)
PMV BLD AUTO: 9.7 FL (ref 7–12)
POTASSIUM SERPL-SCNC: 3.6 MMOL/L (ref 3.5–5)
RBC # BLD: 5.06 E12/L (ref 3.8–5.8)
SODIUM BLD-SCNC: 141 MMOL/L (ref 132–146)
WBC # BLD: 7 E9/L (ref 4.5–11.5)

## 2020-10-12 PROCEDURE — 6360000002 HC RX W HCPCS

## 2020-10-12 PROCEDURE — 3600000019 HC SURGERY ROBOT ADDTL 15MIN: Performed by: UROLOGY

## 2020-10-12 PROCEDURE — 6370000000 HC RX 637 (ALT 250 FOR IP): Performed by: UROLOGY

## 2020-10-12 PROCEDURE — 3700000001 HC ADD 15 MINUTES (ANESTHESIA): Performed by: UROLOGY

## 2020-10-12 PROCEDURE — 6360000002 HC RX W HCPCS: Performed by: NURSE PRACTITIONER

## 2020-10-12 PROCEDURE — 2500000003 HC RX 250 WO HCPCS: Performed by: UROLOGY

## 2020-10-12 PROCEDURE — 7100000000 HC PACU RECOVERY - FIRST 15 MIN: Performed by: UROLOGY

## 2020-10-12 PROCEDURE — 80048 BASIC METABOLIC PNL TOTAL CA: CPT

## 2020-10-12 PROCEDURE — 82962 GLUCOSE BLOOD TEST: CPT

## 2020-10-12 PROCEDURE — 6360000002 HC RX W HCPCS: Performed by: UROLOGY

## 2020-10-12 PROCEDURE — 2580000003 HC RX 258: Performed by: NURSE PRACTITIONER

## 2020-10-12 PROCEDURE — 8E0W4CZ ROBOTIC ASSISTED PROCEDURE OF TRUNK REGION, PERCUTANEOUS ENDOSCOPIC APPROACH: ICD-10-PCS | Performed by: UROLOGY

## 2020-10-12 PROCEDURE — 0TBB4ZZ EXCISION OF BLADDER, PERCUTANEOUS ENDOSCOPIC APPROACH: ICD-10-PCS | Performed by: UROLOGY

## 2020-10-12 PROCEDURE — 3700000000 HC ANESTHESIA ATTENDED CARE: Performed by: UROLOGY

## 2020-10-12 PROCEDURE — S2900 ROBOTIC SURGICAL SYSTEM: HCPCS | Performed by: UROLOGY

## 2020-10-12 PROCEDURE — 88305 TISSUE EXAM BY PATHOLOGIST: CPT

## 2020-10-12 PROCEDURE — 1200000000 HC SEMI PRIVATE

## 2020-10-12 PROCEDURE — 85027 COMPLETE CBC AUTOMATED: CPT

## 2020-10-12 PROCEDURE — 36415 COLL VENOUS BLD VENIPUNCTURE: CPT

## 2020-10-12 PROCEDURE — 7100000001 HC PACU RECOVERY - ADDTL 15 MIN: Performed by: UROLOGY

## 2020-10-12 PROCEDURE — 3600000009 HC SURGERY ROBOT BASE: Performed by: UROLOGY

## 2020-10-12 PROCEDURE — 2500000003 HC RX 250 WO HCPCS

## 2020-10-12 PROCEDURE — 0TJB8ZZ INSPECTION OF BLADDER, VIA NATURAL OR ARTIFICIAL OPENING ENDOSCOPIC: ICD-10-PCS | Performed by: UROLOGY

## 2020-10-12 PROCEDURE — 2709999900 HC NON-CHARGEABLE SUPPLY: Performed by: UROLOGY

## 2020-10-12 RX ORDER — METOCLOPRAMIDE HYDROCHLORIDE 5 MG/ML
10 INJECTION INTRAMUSCULAR; INTRAVENOUS 2 TIMES DAILY
Status: DISCONTINUED | OUTPATIENT
Start: 2020-10-12 | End: 2020-10-13 | Stop reason: HOSPADM

## 2020-10-12 RX ORDER — MEPERIDINE HYDROCHLORIDE 25 MG/ML
12.5 INJECTION INTRAMUSCULAR; INTRAVENOUS; SUBCUTANEOUS EVERY 5 MIN PRN
Status: DISCONTINUED | OUTPATIENT
Start: 2020-10-12 | End: 2020-10-12 | Stop reason: HOSPADM

## 2020-10-12 RX ORDER — DOXAZOSIN MESYLATE 4 MG/1
8 TABLET ORAL NIGHTLY
Status: DISCONTINUED | OUTPATIENT
Start: 2020-10-12 | End: 2020-10-13 | Stop reason: HOSPADM

## 2020-10-12 RX ORDER — SODIUM CHLORIDE 9 MG/ML
INJECTION, SOLUTION INTRAVENOUS CONTINUOUS
Status: DISCONTINUED | OUTPATIENT
Start: 2020-10-12 | End: 2020-10-12

## 2020-10-12 RX ORDER — GLYCOPYRROLATE 1 MG/5 ML
SYRINGE (ML) INTRAVENOUS PRN
Status: DISCONTINUED | OUTPATIENT
Start: 2020-10-12 | End: 2020-10-12 | Stop reason: SDUPTHER

## 2020-10-12 RX ORDER — LIDOCAINE HYDROCHLORIDE 20 MG/ML
INJECTION, SOLUTION INTRAVENOUS PRN
Status: DISCONTINUED | OUTPATIENT
Start: 2020-10-12 | End: 2020-10-12 | Stop reason: SDUPTHER

## 2020-10-12 RX ORDER — HYDROCODONE BITARTRATE AND ACETAMINOPHEN 5; 325 MG/1; MG/1
1 TABLET ORAL EVERY 4 HOURS PRN
Status: DISCONTINUED | OUTPATIENT
Start: 2020-10-12 | End: 2020-10-13 | Stop reason: HOSPADM

## 2020-10-12 RX ORDER — ONDANSETRON 2 MG/ML
4 INJECTION INTRAMUSCULAR; INTRAVENOUS EVERY 12 HOURS PRN
Status: DISCONTINUED | OUTPATIENT
Start: 2020-10-12 | End: 2020-10-13 | Stop reason: HOSPADM

## 2020-10-12 RX ORDER — ONDANSETRON 2 MG/ML
INJECTION INTRAMUSCULAR; INTRAVENOUS PRN
Status: DISCONTINUED | OUTPATIENT
Start: 2020-10-12 | End: 2020-10-12 | Stop reason: SDUPTHER

## 2020-10-12 RX ORDER — CIPROFLOXACIN 2 MG/ML
400 INJECTION, SOLUTION INTRAVENOUS
Status: COMPLETED | OUTPATIENT
Start: 2020-10-12 | End: 2020-10-12

## 2020-10-12 RX ORDER — PROMETHAZINE HYDROCHLORIDE 25 MG/ML
25 INJECTION, SOLUTION INTRAMUSCULAR; INTRAVENOUS PRN
Status: DISCONTINUED | OUTPATIENT
Start: 2020-10-12 | End: 2020-10-12 | Stop reason: HOSPADM

## 2020-10-12 RX ORDER — ROCURONIUM BROMIDE 10 MG/ML
INJECTION, SOLUTION INTRAVENOUS PRN
Status: DISCONTINUED | OUTPATIENT
Start: 2020-10-12 | End: 2020-10-12 | Stop reason: SDUPTHER

## 2020-10-12 RX ORDER — PROPOFOL 10 MG/ML
INJECTION, EMULSION INTRAVENOUS PRN
Status: DISCONTINUED | OUTPATIENT
Start: 2020-10-12 | End: 2020-10-12 | Stop reason: SDUPTHER

## 2020-10-12 RX ORDER — DEXAMETHASONE SODIUM PHOSPHATE 10 MG/ML
INJECTION, SOLUTION INTRAMUSCULAR; INTRAVENOUS PRN
Status: DISCONTINUED | OUTPATIENT
Start: 2020-10-12 | End: 2020-10-12 | Stop reason: SDUPTHER

## 2020-10-12 RX ORDER — NEOSTIGMINE METHYLSULFATE 1 MG/ML
INJECTION, SOLUTION INTRAVENOUS PRN
Status: DISCONTINUED | OUTPATIENT
Start: 2020-10-12 | End: 2020-10-12 | Stop reason: SDUPTHER

## 2020-10-12 RX ORDER — SENNA AND DOCUSATE SODIUM 50; 8.6 MG/1; MG/1
1 TABLET, FILM COATED ORAL DAILY
Status: DISCONTINUED | OUTPATIENT
Start: 2020-10-12 | End: 2020-10-13 | Stop reason: HOSPADM

## 2020-10-12 RX ORDER — SODIUM CHLORIDE 0.9 % (FLUSH) 0.9 %
10 SYRINGE (ML) INJECTION PRN
Status: DISCONTINUED | OUTPATIENT
Start: 2020-10-12 | End: 2020-10-12 | Stop reason: HOSPADM

## 2020-10-12 RX ORDER — CIPROFLOXACIN 2 MG/ML
400 INJECTION, SOLUTION INTRAVENOUS EVERY 12 HOURS
Status: DISCONTINUED | OUTPATIENT
Start: 2020-10-12 | End: 2020-10-13 | Stop reason: HOSPADM

## 2020-10-12 RX ORDER — FENTANYL CITRATE 50 UG/ML
INJECTION, SOLUTION INTRAMUSCULAR; INTRAVENOUS PRN
Status: DISCONTINUED | OUTPATIENT
Start: 2020-10-12 | End: 2020-10-12 | Stop reason: SDUPTHER

## 2020-10-12 RX ORDER — BUPIVACAINE HYDROCHLORIDE 5 MG/ML
INJECTION, SOLUTION EPIDURAL; INTRACAUDAL PRN
Status: DISCONTINUED | OUTPATIENT
Start: 2020-10-12 | End: 2020-10-12 | Stop reason: ALTCHOICE

## 2020-10-12 RX ORDER — MIDAZOLAM HYDROCHLORIDE 1 MG/ML
INJECTION INTRAMUSCULAR; INTRAVENOUS PRN
Status: DISCONTINUED | OUTPATIENT
Start: 2020-10-12 | End: 2020-10-12 | Stop reason: SDUPTHER

## 2020-10-12 RX ORDER — MORPHINE SULFATE 2 MG/ML
2 INJECTION, SOLUTION INTRAMUSCULAR; INTRAVENOUS
Status: DISCONTINUED | OUTPATIENT
Start: 2020-10-12 | End: 2020-10-13 | Stop reason: HOSPADM

## 2020-10-12 RX ORDER — LABETALOL HYDROCHLORIDE 5 MG/ML
5 INJECTION, SOLUTION INTRAVENOUS EVERY 10 MIN PRN
Status: DISCONTINUED | OUTPATIENT
Start: 2020-10-12 | End: 2020-10-12 | Stop reason: HOSPADM

## 2020-10-12 RX ORDER — DEXTROSE, SODIUM CHLORIDE, AND POTASSIUM CHLORIDE 5; .45; .15 G/100ML; G/100ML; G/100ML
INJECTION INTRAVENOUS CONTINUOUS
Status: DISCONTINUED | OUTPATIENT
Start: 2020-10-12 | End: 2020-10-13 | Stop reason: HOSPADM

## 2020-10-12 RX ORDER — SODIUM CHLORIDE 0.9 % (FLUSH) 0.9 %
10 SYRINGE (ML) INJECTION EVERY 12 HOURS SCHEDULED
Status: DISCONTINUED | OUTPATIENT
Start: 2020-10-12 | End: 2020-10-12 | Stop reason: HOSPADM

## 2020-10-12 RX ADMIN — Medication 10 ML: at 10:00

## 2020-10-12 RX ADMIN — DOCUSATE SODIUM 50 MG AND SENNOSIDES 8.6 MG 1 TABLET: 8.6; 5 TABLET, FILM COATED ORAL at 21:10

## 2020-10-12 RX ADMIN — CIPROFLOXACIN 400 MG: 2 INJECTION, SOLUTION INTRAVENOUS at 10:21

## 2020-10-12 RX ADMIN — CIPROFLOXACIN 400 MG: 2 INJECTION, SOLUTION INTRAVENOUS at 21:10

## 2020-10-12 RX ADMIN — FENTANYL CITRATE 50 MCG: 50 INJECTION, SOLUTION INTRAMUSCULAR; INTRAVENOUS at 12:25

## 2020-10-12 RX ADMIN — Medication 0.6 MG: at 12:27

## 2020-10-12 RX ADMIN — LIDOCAINE HYDROCHLORIDE 100 MG: 20 INJECTION, SOLUTION INTRAVENOUS at 10:34

## 2020-10-12 RX ADMIN — POTASSIUM CHLORIDE, DEXTROSE MONOHYDRATE AND SODIUM CHLORIDE: 150; 5; 450 INJECTION, SOLUTION INTRAVENOUS at 16:52

## 2020-10-12 RX ADMIN — FENTANYL CITRATE 50 MCG: 50 INJECTION, SOLUTION INTRAMUSCULAR; INTRAVENOUS at 12:10

## 2020-10-12 RX ADMIN — ROCURONIUM BROMIDE 50 MG: 10 INJECTION, SOLUTION INTRAVENOUS at 10:34

## 2020-10-12 RX ADMIN — MIDAZOLAM 2 MG: 1 INJECTION INTRAMUSCULAR; INTRAVENOUS at 10:31

## 2020-10-12 RX ADMIN — METOCLOPRAMIDE HYDROCHLORIDE 10 MG: 5 INJECTION INTRAMUSCULAR; INTRAVENOUS at 17:00

## 2020-10-12 RX ADMIN — SODIUM CHLORIDE: 9 INJECTION, SOLUTION INTRAVENOUS at 10:10

## 2020-10-12 RX ADMIN — DOXAZOSIN 8 MG: 4 TABLET ORAL at 23:39

## 2020-10-12 RX ADMIN — ONDANSETRON HYDROCHLORIDE 4 MG: 2 INJECTION, SOLUTION INTRAMUSCULAR; INTRAVENOUS at 12:18

## 2020-10-12 RX ADMIN — PROPOFOL 200 MG: 10 INJECTION, EMULSION INTRAVENOUS at 10:34

## 2020-10-12 RX ADMIN — Medication 3 MG: at 12:27

## 2020-10-12 RX ADMIN — ROCURONIUM BROMIDE 10 MG: 10 INJECTION, SOLUTION INTRAVENOUS at 11:33

## 2020-10-12 RX ADMIN — FENTANYL CITRATE 50 MCG: 50 INJECTION, SOLUTION INTRAMUSCULAR; INTRAVENOUS at 12:21

## 2020-10-12 RX ADMIN — FENTANYL CITRATE 100 MCG: 50 INJECTION, SOLUTION INTRAMUSCULAR; INTRAVENOUS at 10:34

## 2020-10-12 RX ADMIN — POTASSIUM CHLORIDE, DEXTROSE MONOHYDRATE AND SODIUM CHLORIDE: 150; 5; 450 INJECTION, SOLUTION INTRAVENOUS at 21:50

## 2020-10-12 RX ADMIN — DEXAMETHASONE SODIUM PHOSPHATE 10 MG: 10 INJECTION, SOLUTION INTRAMUSCULAR; INTRAVENOUS at 10:34

## 2020-10-12 ASSESSMENT — PULMONARY FUNCTION TESTS
PIF_VALUE: 19
PIF_VALUE: 34
PIF_VALUE: 1
PIF_VALUE: 22
PIF_VALUE: 13
PIF_VALUE: 26
PIF_VALUE: 16
PIF_VALUE: 34
PIF_VALUE: 33
PIF_VALUE: 21
PIF_VALUE: 34
PIF_VALUE: 34
PIF_VALUE: 21
PIF_VALUE: 21
PIF_VALUE: 34
PIF_VALUE: 34
PIF_VALUE: 19
PIF_VALUE: 34
PIF_VALUE: 20
PIF_VALUE: 34
PIF_VALUE: 22
PIF_VALUE: 34
PIF_VALUE: 33
PIF_VALUE: 34
PIF_VALUE: 19
PIF_VALUE: 16
PIF_VALUE: 27
PIF_VALUE: 34
PIF_VALUE: 16
PIF_VALUE: 16
PIF_VALUE: 4
PIF_VALUE: 34
PIF_VALUE: 29
PIF_VALUE: 34
PIF_VALUE: 33
PIF_VALUE: 0
PIF_VALUE: 34
PIF_VALUE: 25
PIF_VALUE: 34
PIF_VALUE: 33
PIF_VALUE: 15
PIF_VALUE: 19
PIF_VALUE: 34
PIF_VALUE: 34
PIF_VALUE: 21
PIF_VALUE: 22
PIF_VALUE: 24
PIF_VALUE: 34
PIF_VALUE: 34
PIF_VALUE: 0
PIF_VALUE: 34
PIF_VALUE: 34
PIF_VALUE: 15
PIF_VALUE: 34
PIF_VALUE: 22
PIF_VALUE: 34
PIF_VALUE: 35
PIF_VALUE: 34
PIF_VALUE: 34
PIF_VALUE: 10
PIF_VALUE: 22
PIF_VALUE: 0
PIF_VALUE: 21
PIF_VALUE: 14
PIF_VALUE: 33
PIF_VALUE: 33
PIF_VALUE: 34
PIF_VALUE: 33
PIF_VALUE: 0
PIF_VALUE: 3
PIF_VALUE: 24
PIF_VALUE: 29
PIF_VALUE: 24
PIF_VALUE: 34
PIF_VALUE: 26
PIF_VALUE: 34
PIF_VALUE: 34
PIF_VALUE: 16
PIF_VALUE: 34
PIF_VALUE: 33
PIF_VALUE: 21
PIF_VALUE: 34
PIF_VALUE: 21
PIF_VALUE: 34
PIF_VALUE: 24
PIF_VALUE: 33
PIF_VALUE: 26
PIF_VALUE: 34
PIF_VALUE: 32
PIF_VALUE: 34
PIF_VALUE: 1
PIF_VALUE: 23
PIF_VALUE: 34
PIF_VALUE: 35
PIF_VALUE: 34
PIF_VALUE: 23
PIF_VALUE: 34
PIF_VALUE: 28
PIF_VALUE: 20
PIF_VALUE: 31
PIF_VALUE: 29
PIF_VALUE: 34
PIF_VALUE: 33
PIF_VALUE: 34

## 2020-10-12 ASSESSMENT — PAIN DESCRIPTION - LOCATION: LOCATION: ABDOMEN

## 2020-10-12 ASSESSMENT — PAIN - FUNCTIONAL ASSESSMENT: PAIN_FUNCTIONAL_ASSESSMENT: 0-10

## 2020-10-12 ASSESSMENT — PAIN SCALES - GENERAL: PAINLEVEL_OUTOF10: 0

## 2020-10-12 ASSESSMENT — PAIN DESCRIPTION - PAIN TYPE: TYPE: SURGICAL PAIN

## 2020-10-12 NOTE — OP NOTE
Operative Note      Patient: Ha Antonio  YOB: 1950  MRN: [id number]    Date of Procedure: 10/12/2020    Pre-Op Diagnosis: MASSIVE BLADDER DIVERTICULUM    Post-Op Diagnosis: Same       Procedure(s):  LAPAROSCOPIC ROBOTIC ASSISTED BLADDER DIVERTICULECTOMY WITH FLEXIBLE  CYSTOSCOPY    Surgeon(s):  Emma Jesus MD    Assistant:   First Assistant: Rodriguez [place]    Anesthesia: General    Estimated Blood Loss (mL): Minimal    Complications: None    Specimens:   ID Type Source Tests Collected by Time Destination   A : BLADDER DIVERTICULUM Tissue Tissue SURGICAL PATHOLOGY Emma Jesus MD 10/12/2020 1216        Implants:  * No implants in log *      Drains:   Closed/Suction Drain Right RLQ Bulb 15 Bulgarian (Active)   Dressing Status Clean;Dry; Intact 10/12/20 1311   Drainage Appearance Serosanguinous 10/12/20 1311   Status Compressed 10/12/20 1311       Urethral Catheter Straight-tip 20 fr (Active)       [REMOVED] Urethral Catheter Double-lumen 16 fr (Removed)       [REMOVED] Urethral Catheter Double-lumen 16 fr (Removed)   $ Urethral catheter insertion Inserted for procedure 10/12/20 1220         INDICATION FOR PROCEDURE: Ha Antonio is a 78 y.o. male diagnosed with urinary retention and has a large bladder diverticulum. He presents for robot-assisted laparoscopic diverticulectomy. He understands the risks, benefits and alternatives of the procedure, which are outlined in my office chart. The patient signed informed consent and agreed to proceed. PROCEDURE:       The patient was brought into the operating room and placed under general anesthetic. He was placed in the dorsal lithotomy position and all areas in contact with the surgical table were adequately padded. The arms were tucked and padded by anesthesia staff. The patient was shaven, prepped, and draped in a sterile fashion. A flexible cystoscope was passed through the urethra and into the bladder. The prostate was well resected. The bladder showed moderate to severe trabeculation. On the posterior bladder wall favoring the left side there was a small opening to a very large diverticulum. There were no tumors or stones identified. A Raymond catheter was replaced. An 8-mm supraumbilical incision was made through the skin and subcutaneous tissues, down to the rectus fascia. The Veress needle was placed safely into the abdominal cavity and the abdomen was insufflated to 15 mmHg with carbon dioxide. The patient was placed in the steep Trendelenburg position and a 8-mm nonbladed trocar was placed through the incision and safely into the abdominal cavity. The da Chance camera was immediately placed and the entire abdominal cavity was inspected and there was no injury to underlying structures during placement of the initial trocar. The remaining trocar was placed in the following configuration under direct visualization: An 8-mm da Chance trocar 1 handbreadth and lateral to the left side, to the camera port; an additional da Chance trocar 1 handbreadth and lateral to the initial da Chance trocar on the left side; a da Chance trocar 1 handbreadth lateral to the camera port on the right side; a 12-mm non-bladed trocar in the right lower quadrant 1 handbreadth lateral to the [employer] [facility] trocar. There was no injury to underlying structures during placement of these subsequent trocars. The da Chance robot was then safely docked to the patient and the instrument was inserted under direct visualization. The insufflation was decreased to 15 mmHg. The bladder was filled with saline through the Raymond catheter. A large diverticulum was identified on the left side the posterior aspect of the bladder. The peritoneum was incised and dissected off of the diverticulum. Dissection was carried out to a small base and this was incised viewing the bladder mucosa. The diverticulum was cut into 4 pieces and removed via a laparoscopic retrieval bag.     The mucosa was closed with a 30V lock suture in a running fashion. The muscularis was then closed over this with the same suture in a running fashion. The catheter was irrigated found to be watertight without leakage through the incision. Peritoneum was then closed using a 30V lock suture. A LORRAINE was left indwelling. The abdomen was desufflated. The trochars were removed. The skin was closed with 4-0 Vicryl suture in a running fashion and Dermabond was applied to the skin.       Electronically signed by Naveed Root MD on 10/12/2020 at 1:33 PM

## 2020-10-12 NOTE — ANESTHESIA PRE PROCEDURE
Department of Anesthesiology  Preprocedure Note       Name:  Milana Mccoy   Age:  78 y.o.  :  1950                                          MRN:  [id number]         Date:  10/12/2020      Surgeon: Coral Moreira):  Padmaja Perdomo MD    Procedure: Procedure(s):  LAPAROSCOPIC ROBOTIC ASSISTED BLADDER DIVERTICULECTOMY WITH FLEXIBLE  CYSTOSCOPY    Medications prior to admission:   Prior to Admission medications    Medication Sig Start Date End Date Taking?  Authorizing Provider   terazosin (HYTRIN) 10 MG capsule nightly  20  Yes Historical Provider, MD   rivaroxaban (XARELTO) 20 MG TABS tablet Take 1 tablet by mouth daily 7/23/20 10/21/20 Yes Doug Gomez MD   lisinopril-hydroCHLOROthiazide (PRINZIDE;ZESTORETIC) 20-12.5 MG per tablet Take 2 tablets by mouth every morning   Yes Historical Provider, MD   empagliflozin (JARDIANCE) 10 MG tablet Take 10 mg by mouth daily   Yes Historical Provider, MD   bethanechol (URECHOLINE) 25 MG tablet Take 25 mg by mouth 3 times daily   Yes Historical Provider, MD   Multiple Vitamins-Minerals (CENTRUM/CERTA-ANABEL WITH MINERALS ORAL) solution Take 15 mLs by mouth daily   Yes Historical Provider, MD   sodium chloride (OCEAN, BABY AYR) 0.65 % nasal spray 1 spray by Nasal route as needed for Congestion   Yes Historical Provider, MD   oxymetazoline (AFRIN) 0.05 % nasal spray 2 sprays by Nasal route 2 times daily   Yes Historical Provider, MD   diphenhydrAMINE (BENADRYL) 25 MG tablet Take 50 mg by mouth every 6 hours as needed for Itching   Yes Historical Provider, MD   albuterol sulfate HFA (VENTOLIN HFA) 108 (90 Base) MCG/ACT inhaler Inhale 2 puffs into the lungs every 4 hours as needed for Wheezing or Shortness of Breath    Historical Provider, MD       Current medications:    Current Facility-Administered Medications   Medication Dose Route Frequency Provider Last Rate Last Dose    0.9 % sodium chloride infusion   Intravenous Continuous Cuca Quevedo, APRN -  tolerance: poor (<4 METS),   (+) hypertension:, dysrhythmias: atrial fibrillation,       ECG reviewed      Echocardiogram reviewed                  Neuro/Psych:   Negative Neuro/Psych ROS              GI/Hepatic/Renal:   (+) morbid obesity          Endo/Other:    (+) Diabeteswell controlled, , blood dyscrasia: anticoagulation therapy, arthritis:., .                 Abdominal:           Vascular:                                      Anesthesia Plan      general     ASA 3       Induction: intravenous. MIPS: Postoperative opioids intended, Prophylactic antiemetics administered and Postoperative trial extubation. Anesthetic plan and risks discussed with patient. Plan discussed with attending.                   Evette Hamman, APRN - CRNA   10/12/2020

## 2020-10-12 NOTE — PROGRESS NOTES
COVID testing completed on: 10/8/2020  Results of the test: negative  The patient verbally confirms that they did adhere to the self-quarantine guidelines. No signs or symptoms expressed or observed.

## 2020-10-13 VITALS
HEIGHT: 74 IN | SYSTOLIC BLOOD PRESSURE: 127 MMHG | HEART RATE: 64 BPM | DIASTOLIC BLOOD PRESSURE: 72 MMHG | BODY MASS INDEX: 40.43 KG/M2 | RESPIRATION RATE: 18 BRPM | OXYGEN SATURATION: 95 % | TEMPERATURE: 98 F | WEIGHT: 315 LBS

## 2020-10-13 LAB
ANION GAP SERPL CALCULATED.3IONS-SCNC: 15 MMOL/L (ref 7–16)
BUN BLDV-MCNC: 17 MG/DL (ref 8–23)
CALCIUM SERPL-MCNC: 8.5 MG/DL (ref 8.6–10.2)
CHLORIDE BLD-SCNC: 99 MMOL/L (ref 98–107)
CO2: 21 MMOL/L (ref 22–29)
CREAT SERPL-MCNC: 0.9 MG/DL (ref 0.7–1.2)
CREATININE FLUID: 1.1 MG/DL
FLUID TYPE: NORMAL
GFR AFRICAN AMERICAN: >60
GFR NON-AFRICAN AMERICAN: >60 ML/MIN/1.73
GLUCOSE BLD-MCNC: 218 MG/DL (ref 74–99)
HCT VFR BLD CALC: 39.6 % (ref 37–54)
HEMOGLOBIN: 13.1 G/DL (ref 12.5–16.5)
METER GLUCOSE: 270 MG/DL (ref 74–99)
POTASSIUM SERPL-SCNC: 3.7 MMOL/L (ref 3.5–5)
SODIUM BLD-SCNC: 135 MMOL/L (ref 132–146)

## 2020-10-13 PROCEDURE — 85014 HEMATOCRIT: CPT

## 2020-10-13 PROCEDURE — 6360000002 HC RX W HCPCS: Performed by: UROLOGY

## 2020-10-13 PROCEDURE — 82570 ASSAY OF URINE CREATININE: CPT

## 2020-10-13 PROCEDURE — 82962 GLUCOSE BLOOD TEST: CPT

## 2020-10-13 PROCEDURE — 80048 BASIC METABOLIC PNL TOTAL CA: CPT

## 2020-10-13 PROCEDURE — 6370000000 HC RX 637 (ALT 250 FOR IP): Performed by: UROLOGY

## 2020-10-13 PROCEDURE — 2500000003 HC RX 250 WO HCPCS: Performed by: UROLOGY

## 2020-10-13 PROCEDURE — 36415 COLL VENOUS BLD VENIPUNCTURE: CPT

## 2020-10-13 PROCEDURE — 85018 HEMOGLOBIN: CPT

## 2020-10-13 RX ORDER — CIPROFLOXACIN 250 MG/1
250 TABLET, FILM COATED ORAL 2 TIMES DAILY
Qty: 6 TABLET | Refills: 0 | Status: SHIPPED | OUTPATIENT
Start: 2020-10-13 | End: 2020-10-16

## 2020-10-13 RX ORDER — ACETAMINOPHEN AND CODEINE PHOSPHATE 300; 30 MG/1; MG/1
1-2 TABLET ORAL EVERY 4 HOURS PRN
Qty: 10 TABLET | Refills: 0 | Status: SHIPPED | OUTPATIENT
Start: 2020-10-13 | End: 2020-10-20

## 2020-10-13 RX ADMIN — POTASSIUM CHLORIDE, DEXTROSE MONOHYDRATE AND SODIUM CHLORIDE: 150; 5; 450 INJECTION, SOLUTION INTRAVENOUS at 00:36

## 2020-10-13 RX ADMIN — HYDROCODONE BITARTRATE AND ACETAMINOPHEN 1 TABLET: 5; 325 TABLET ORAL at 06:10

## 2020-10-13 RX ADMIN — HYDROCODONE BITARTRATE AND ACETAMINOPHEN 1 TABLET: 5; 325 TABLET ORAL at 00:39

## 2020-10-13 RX ADMIN — METOCLOPRAMIDE HYDROCHLORIDE 10 MG: 5 INJECTION INTRAMUSCULAR; INTRAVENOUS at 09:51

## 2020-10-13 RX ADMIN — HYDROCODONE BITARTRATE AND ACETAMINOPHEN 1 TABLET: 5; 325 TABLET ORAL at 11:01

## 2020-10-13 RX ADMIN — DOCUSATE SODIUM 50 MG AND SENNOSIDES 8.6 MG 1 TABLET: 8.6; 5 TABLET, FILM COATED ORAL at 09:51

## 2020-10-13 RX ADMIN — CIPROFLOXACIN 400 MG: 2 INJECTION, SOLUTION INTRAVENOUS at 09:52

## 2020-10-13 ASSESSMENT — PAIN DESCRIPTION - ORIENTATION
ORIENTATION: MID
ORIENTATION: MID

## 2020-10-13 ASSESSMENT — PAIN DESCRIPTION - DESCRIPTORS
DESCRIPTORS: ACHING;CONSTANT;DISCOMFORT
DESCRIPTORS: ACHING;DISCOMFORT

## 2020-10-13 ASSESSMENT — PAIN DESCRIPTION - LOCATION
LOCATION: ABDOMEN
LOCATION: ABDOMEN

## 2020-10-13 ASSESSMENT — PAIN DESCRIPTION - FREQUENCY: FREQUENCY: CONTINUOUS

## 2020-10-13 ASSESSMENT — PAIN SCALES - GENERAL
PAINLEVEL_OUTOF10: 5
PAINLEVEL_OUTOF10: 3
PAINLEVEL_OUTOF10: 5
PAINLEVEL_OUTOF10: 4

## 2020-10-13 ASSESSMENT — PAIN DESCRIPTION - PAIN TYPE
TYPE: SURGICAL PAIN
TYPE: SURGICAL PAIN

## 2020-10-13 ASSESSMENT — PAIN DESCRIPTION - ONSET: ONSET: ON-GOING

## 2020-10-13 NOTE — PROGRESS NOTES
Discharge instructions given to pt. This RN went over all instructions with pt and pts wife.  Both pt and wife verbalized understanding

## 2020-10-13 NOTE — CARE COORDINATION
Discharge order noted. Patient is POD#1 for LAPAROSCOPIC ROBOTIC ASSISTED BLADDER DIVERTICULECTOMY WITH FLEXIBLE CYSTOSCOPY for MASSIVE BLADDER DIVERTICULUM. Per urology note today, discharge to home with Raymond to leg bag and with night bag. I met with patient and his wife Margarito Lebron in room to explain role and discuss transition of care. I offered home health care and they declined. Patient said that he has managed a catheter before and is able to manage it again. His wife said she will transport her  home today.   Rasheed Vincent RN CM

## 2020-10-13 NOTE — DISCHARGE SUMMARY
DISCHARGE SUMMARY    Sarmad Gonzales  For discharge on 10/13/2020 after admission for bladder diverticulectomy. During the hospital stay he had no issues. CONDITION AT DISCHARGE: stable    DISPOSITION: HOME    DISCHARGE INSTRUCTIONS: see discharge instructions under discharge instruction section of EHR    DISCHARGE MEDICATIONS: Scheduled Meds:   sennosides-docusate sodium  1 tablet Oral Daily    metoclopramide  10 mg Intravenous BID    ciprofloxacin  400 mg Intravenous Q12H    doxazosin  8 mg Oral Nightly     Continuous Infusions:   dextrose 5% and 0.45% NaCl with KCl 20 mEq 125 mL/hr at 10/13/20 0036     PRN Meds:.ondansetron, morphine, HYDROcodone-acetaminophen    FOLLOW-UP CARE: Follow-up with [facility] in 1-2 weeks. Call if any fever, chills, nausea or vomiting or any concerns.         Brinda Cadet   6:59 AM  10/13/2020

## 2020-10-13 NOTE — PROGRESS NOTES
CLINICAL PHARMACY NOTE: MEDS TO [address] Select Patient?: No  Total # of Prescriptions Filled: 1   The following medications were delivered to the patient:  · CIPRO 250 MG  Total # of Interventions Completed: 3  Time Spent (min): 15    Additional Documentation:

## 2020-10-13 NOTE — PROGRESS NOTES
Patient without complaints. Pain well controlled. Ambulated per protocol. No nausea or vomiting. Tolerating diet well. Vitals:    10/13/20 0039   BP: (!) 143/72   Pulse: 70   Resp: 16   Temp: 97.8 °F (36.6 °C)   SpO2: 93%       Abdomen soft, non-tender, non-distended  Incisions clean, dry, intact  Lower extremities without swelling, cords, tenderness, symmetric  Raymond draining well and urine clear. LORRAINE serosanguinous minimal drainage    LORRAINE Creatinine pending    Assessment / Plan:  S/P Robot-Assisted Laparoscopic Bladder Diverticulectomy POD #1:  -stable  -LORRAINE out today  -discharge to home with Raymond to leg bag and with night bag    I sat with patient and explained homegoing instruction. He is to maintain activity and call if any fever, chills, nausea or vomiting. He is to follow-up in 1 week for a cystogram and Raymond removal if cystogram shows no extravasation. The patients questions were answered in entirety.         Carina Wallis M.D.  10/13/2020  6:56 AM

## 2020-10-19 ENCOUNTER — HOSPITAL ENCOUNTER (OUTPATIENT)
Dept: GENERAL RADIOLOGY | Age: 70
Discharge: HOME | End: 2020-10-21
Payer: MEDICARE

## 2020-10-19 PROCEDURE — 74430 CONTRAST X-RAY BLADDER: CPT

## 2020-10-19 PROCEDURE — 6360000002 HC RX W HCPCS: Performed by: RADIOLOGY

## 2020-10-19 PROCEDURE — 51600 INJECTION FOR BLADDER X-RAY: CPT

## 2020-10-19 RX ADMIN — DIATRIZOATE MEGLUMINE 150 ML: 180 INJECTION, SOLUTION INTRAVESICAL at 13:30

## 2021-03-22 ENCOUNTER — TELEPHONE (OUTPATIENT)
Dept: CARDIOLOGY CLINIC | Age: 71
End: 2021-03-22

## 2021-03-22 NOTE — TELEPHONE ENCOUNTER
Called patient to schedule his annual OV. He does not wish to schedule at this time and will call us when he is ready.

## 2022-03-24 ENCOUNTER — APPOINTMENT (OUTPATIENT)
Dept: GENERAL RADIOLOGY | Age: 72
End: 2022-03-24
Payer: MEDICARE

## 2022-03-24 ENCOUNTER — HOSPITAL ENCOUNTER (INPATIENT)
Age: 72
LOS: 2 days | Discharge: HOME | DRG: 247 | End: 2022-03-26
Attending: STUDENT IN AN ORGANIZED HEALTH CARE EDUCATION/TRAINING PROGRAM | Admitting: STUDENT IN AN ORGANIZED HEALTH CARE EDUCATION/TRAINING PROGRAM
Payer: MEDICARE

## 2022-03-24 ENCOUNTER — HOSPITAL ENCOUNTER (EMERGENCY)
Age: 72
Discharge: ANOTHER HOSPITAL | End: 2022-03-24
Attending: EMERGENCY MEDICINE
Payer: MEDICARE

## 2022-03-24 VITALS
HEIGHT: 75 IN | TEMPERATURE: 98 F | HEART RATE: 79 BPM | DIASTOLIC BLOOD PRESSURE: 87 MMHG | WEIGHT: 315 LBS | SYSTOLIC BLOOD PRESSURE: 157 MMHG | BODY MASS INDEX: 39.17 KG/M2 | OXYGEN SATURATION: 95 % | RESPIRATION RATE: 20 BRPM

## 2022-03-24 DIAGNOSIS — E87.6 HYPOKALEMIA: ICD-10-CM

## 2022-03-24 DIAGNOSIS — I21.3 ST ELEVATION MYOCARDIAL INFARCTION (STEMI), UNSPECIFIED ARTERY (HCC): Primary | ICD-10-CM

## 2022-03-24 DIAGNOSIS — I25.10 CAD IN NATIVE ARTERY: ICD-10-CM

## 2022-03-24 LAB
ABO/RH: NORMAL
ANION GAP SERPL CALCULATED.3IONS-SCNC: 14 MMOL/L (ref 7–16)
ANTIBODY SCREEN: NORMAL
APTT: 27.2 SEC (ref 24.5–35.1)
BASOPHILS ABSOLUTE: 0.05 E9/L (ref 0–0.2)
BASOPHILS RELATIVE PERCENT: 0.7 % (ref 0–2)
BUN BLDV-MCNC: 13 MG/DL (ref 6–23)
CALCIUM SERPL-MCNC: 9 MG/DL (ref 8.6–10.2)
CHLORIDE BLD-SCNC: 101 MMOL/L (ref 98–107)
CO2: 22 MMOL/L (ref 22–29)
CREAT SERPL-MCNC: 0.8 MG/DL (ref 0.7–1.2)
EKG ATRIAL RATE: 79 BPM
EKG P AXIS: 46 DEGREES
EKG P-R INTERVAL: 180 MS
EKG Q-T INTERVAL: 428 MS
EKG QRS DURATION: 140 MS
EKG QTC CALCULATION (BAZETT): 490 MS
EKG R AXIS: -88 DEGREES
EKG T AXIS: 10 DEGREES
EKG VENTRICULAR RATE: 79 BPM
EOSINOPHILS ABSOLUTE: 0.15 E9/L (ref 0.05–0.5)
EOSINOPHILS RELATIVE PERCENT: 2 % (ref 0–6)
GFR AFRICAN AMERICAN: >60
GFR NON-AFRICAN AMERICAN: >60 ML/MIN/1.73
GLUCOSE BLD-MCNC: 261 MG/DL (ref 74–99)
HCT VFR BLD CALC: 43 % (ref 37–54)
HEMOGLOBIN: 14.9 G/DL (ref 12.5–16.5)
IMMATURE GRANULOCYTES #: 0.06 E9/L
IMMATURE GRANULOCYTES %: 0.8 % (ref 0–5)
LV EF: 50 %
LVEF MODALITY: NORMAL
LYMPHOCYTES ABSOLUTE: 1.47 E9/L (ref 1.5–4)
LYMPHOCYTES RELATIVE PERCENT: 19.8 % (ref 20–42)
MAGNESIUM: 1.9 MG/DL (ref 1.6–2.6)
MCH RBC QN AUTO: 29.9 PG (ref 26–35)
MCHC RBC AUTO-ENTMCNC: 34.7 % (ref 32–34.5)
MCV RBC AUTO: 86.2 FL (ref 80–99.9)
METER GLUCOSE: 154 MG/DL (ref 74–99)
MONOCYTES ABSOLUTE: 0.41 E9/L (ref 0.1–0.95)
MONOCYTES RELATIVE PERCENT: 5.5 % (ref 2–12)
NEUTROPHILS ABSOLUTE: 5.28 E9/L (ref 1.8–7.3)
NEUTROPHILS RELATIVE PERCENT: 71.2 % (ref 43–80)
PDW BLD-RTO: 14.2 FL (ref 11.5–15)
PLATELET # BLD: 188 E9/L (ref 130–450)
PMV BLD AUTO: 9.4 FL (ref 7–12)
POC ACT LR: 197 SECONDS
POC ACT LR: 211 SECONDS
POC ACT LR: 225 SECONDS
POTASSIUM REFLEX MAGNESIUM: 3.1 MMOL/L (ref 3.5–5)
RBC # BLD: 4.99 E12/L (ref 3.8–5.8)
SODIUM BLD-SCNC: 137 MMOL/L (ref 132–146)
TROPONIN, HIGH SENSITIVITY: 127 NG/L (ref 0–11)
TROPONIN, HIGH SENSITIVITY: 144 NG/L (ref 0–11)
WBC # BLD: 7.4 E9/L (ref 4.5–11.5)

## 2022-03-24 PROCEDURE — 83735 ASSAY OF MAGNESIUM: CPT

## 2022-03-24 PROCEDURE — 86900 BLOOD TYPING SEROLOGIC ABO: CPT

## 2022-03-24 PROCEDURE — 96376 TX/PRO/DX INJ SAME DRUG ADON: CPT

## 2022-03-24 PROCEDURE — 84484 ASSAY OF TROPONIN QUANT: CPT

## 2022-03-24 PROCEDURE — 2500000003 HC RX 250 WO HCPCS

## 2022-03-24 PROCEDURE — 92941 PRQ TRLML REVSC TOT OCCL AMI: CPT | Performed by: INTERNAL MEDICINE

## 2022-03-24 PROCEDURE — 93005 ELECTROCARDIOGRAM TRACING: CPT | Performed by: EMERGENCY MEDICINE

## 2022-03-24 PROCEDURE — 99285 EMERGENCY DEPT VISIT HI MDM: CPT

## 2022-03-24 PROCEDURE — 2500000003 HC RX 250 WO HCPCS: Performed by: EMERGENCY MEDICINE

## 2022-03-24 PROCEDURE — 96375 TX/PRO/DX INJ NEW DRUG ADDON: CPT

## 2022-03-24 PROCEDURE — B2151ZZ FLUOROSCOPY OF LEFT HEART USING LOW OSMOLAR CONTRAST: ICD-10-PCS | Performed by: INTERNAL MEDICINE

## 2022-03-24 PROCEDURE — 85730 THROMBOPLASTIN TIME PARTIAL: CPT

## 2022-03-24 PROCEDURE — 2580000003 HC RX 258: Performed by: INTERNAL MEDICINE

## 2022-03-24 PROCEDURE — 6370000000 HC RX 637 (ALT 250 FOR IP): Performed by: EMERGENCY MEDICINE

## 2022-03-24 PROCEDURE — B2111ZZ FLUOROSCOPY OF MULTIPLE CORONARY ARTERIES USING LOW OSMOLAR CONTRAST: ICD-10-PCS | Performed by: INTERNAL MEDICINE

## 2022-03-24 PROCEDURE — C1769 GUIDE WIRE: HCPCS

## 2022-03-24 PROCEDURE — 6360000002 HC RX W HCPCS: Performed by: EMERGENCY MEDICINE

## 2022-03-24 PROCEDURE — 86850 RBC ANTIBODY SCREEN: CPT

## 2022-03-24 PROCEDURE — 2140000000 HC CCU INTERMEDIATE R&B

## 2022-03-24 PROCEDURE — C1894 INTRO/SHEATH, NON-LASER: HCPCS

## 2022-03-24 PROCEDURE — C1874 STENT, COATED/COV W/DEL SYS: HCPCS

## 2022-03-24 PROCEDURE — 36415 COLL VENOUS BLD VENIPUNCTURE: CPT

## 2022-03-24 PROCEDURE — 85347 COAGULATION TIME ACTIVATED: CPT

## 2022-03-24 PROCEDURE — 6360000002 HC RX W HCPCS

## 2022-03-24 PROCEDURE — 71045 X-RAY EXAM CHEST 1 VIEW: CPT

## 2022-03-24 PROCEDURE — C1725 CATH, TRANSLUMIN NON-LASER: HCPCS

## 2022-03-24 PROCEDURE — 82962 GLUCOSE BLOOD TEST: CPT

## 2022-03-24 PROCEDURE — 99291 CRITICAL CARE FIRST HOUR: CPT | Performed by: INTERNAL MEDICINE

## 2022-03-24 PROCEDURE — C9606 PERC D-E COR REVASC W AMI S: HCPCS

## 2022-03-24 PROCEDURE — 80048 BASIC METABOLIC PNL TOTAL CA: CPT

## 2022-03-24 PROCEDURE — 93458 L HRT ARTERY/VENTRICLE ANGIO: CPT | Performed by: INTERNAL MEDICINE

## 2022-03-24 PROCEDURE — 2709999900 HC NON-CHARGEABLE SUPPLY

## 2022-03-24 PROCEDURE — 96374 THER/PROPH/DIAG INJ IV PUSH: CPT

## 2022-03-24 PROCEDURE — 6370000000 HC RX 637 (ALT 250 FOR IP)

## 2022-03-24 PROCEDURE — 4A023N7 MEASUREMENT OF CARDIAC SAMPLING AND PRESSURE, LEFT HEART, PERCUTANEOUS APPROACH: ICD-10-PCS | Performed by: INTERNAL MEDICINE

## 2022-03-24 PROCEDURE — 6370000000 HC RX 637 (ALT 250 FOR IP): Performed by: INTERNAL MEDICINE

## 2022-03-24 PROCEDURE — 93458 L HRT ARTERY/VENTRICLE ANGIO: CPT

## 2022-03-24 PROCEDURE — 86901 BLOOD TYPING SEROLOGIC RH(D): CPT

## 2022-03-24 PROCEDURE — 85025 COMPLETE CBC W/AUTO DIFF WBC: CPT

## 2022-03-24 PROCEDURE — C1887 CATHETER, GUIDING: HCPCS

## 2022-03-24 PROCEDURE — 027035Z DILATION OF CORONARY ARTERY, ONE ARTERY WITH TWO DRUG-ELUTING INTRALUMINAL DEVICES, PERCUTANEOUS APPROACH: ICD-10-PCS | Performed by: INTERNAL MEDICINE

## 2022-03-24 RX ORDER — DEXTROSE MONOHYDRATE 25 G/50ML
12.5 INJECTION, SOLUTION INTRAVENOUS PRN
Status: DISCONTINUED | OUTPATIENT
Start: 2022-03-24 | End: 2022-03-26 | Stop reason: HOSPADM

## 2022-03-24 RX ORDER — HEPARIN SODIUM 1000 [USP'U]/ML
10000 INJECTION, SOLUTION INTRAVENOUS; SUBCUTANEOUS ONCE
Status: COMPLETED | OUTPATIENT
Start: 2022-03-24 | End: 2022-03-24

## 2022-03-24 RX ORDER — DIPHENHYDRAMINE HCL 25 MG
50 TABLET ORAL EVERY 6 HOURS PRN
Status: DISCONTINUED | OUTPATIENT
Start: 2022-03-24 | End: 2022-03-26 | Stop reason: HOSPADM

## 2022-03-24 RX ORDER — HEPARIN SODIUM 10000 [USP'U]/100ML
5-30 INJECTION, SOLUTION INTRAVENOUS CONTINUOUS
Status: DISCONTINUED | OUTPATIENT
Start: 2022-03-24 | End: 2022-03-24

## 2022-03-24 RX ORDER — CLOPIDOGREL BISULFATE 75 MG/1
75 TABLET ORAL DAILY
Status: DISCONTINUED | OUTPATIENT
Start: 2022-03-25 | End: 2022-03-25

## 2022-03-24 RX ORDER — NITROGLYCERIN 20 MG/100ML
5-200 INJECTION INTRAVENOUS CONTINUOUS
Status: DISCONTINUED | OUTPATIENT
Start: 2022-03-24 | End: 2022-03-24 | Stop reason: HOSPADM

## 2022-03-24 RX ORDER — NITROGLYCERIN 0.4 MG/1
0.4 TABLET SUBLINGUAL EVERY 5 MIN PRN
Status: DISCONTINUED | OUTPATIENT
Start: 2022-03-24 | End: 2022-03-24 | Stop reason: HOSPADM

## 2022-03-24 RX ORDER — ACETAMINOPHEN 325 MG/1
650 TABLET ORAL EVERY 4 HOURS PRN
Status: DISCONTINUED | OUTPATIENT
Start: 2022-03-24 | End: 2022-03-26 | Stop reason: HOSPADM

## 2022-03-24 RX ORDER — ALBUTEROL SULFATE 2.5 MG/3ML
2.5 SOLUTION RESPIRATORY (INHALATION) EVERY 4 HOURS PRN
Status: DISCONTINUED | OUTPATIENT
Start: 2022-03-24 | End: 2022-03-26 | Stop reason: HOSPADM

## 2022-03-24 RX ORDER — HEPARIN SODIUM 1000 [USP'U]/ML
5000 INJECTION, SOLUTION INTRAVENOUS; SUBCUTANEOUS PRN
Status: DISCONTINUED | OUTPATIENT
Start: 2022-03-24 | End: 2022-03-24

## 2022-03-24 RX ORDER — NICOTINE POLACRILEX 4 MG
15 LOZENGE BUCCAL PRN
Status: DISCONTINUED | OUTPATIENT
Start: 2022-03-24 | End: 2022-03-26 | Stop reason: HOSPADM

## 2022-03-24 RX ORDER — SODIUM CHLORIDE 0.9 % (FLUSH) 0.9 %
SYRINGE (ML) INJECTION
Status: DISCONTINUED
Start: 2022-03-24 | End: 2022-03-24 | Stop reason: HOSPADM

## 2022-03-24 RX ORDER — POTASSIUM CHLORIDE 20 MEQ/1
40 TABLET, EXTENDED RELEASE ORAL ONCE
Status: COMPLETED | OUTPATIENT
Start: 2022-03-24 | End: 2022-03-24

## 2022-03-24 RX ORDER — ASPIRIN 81 MG/1
81 TABLET ORAL DAILY
Status: DISCONTINUED | OUTPATIENT
Start: 2022-03-25 | End: 2022-03-26 | Stop reason: HOSPADM

## 2022-03-24 RX ORDER — METOPROLOL SUCCINATE 25 MG/1
25 TABLET, EXTENDED RELEASE ORAL DAILY
Status: DISCONTINUED | OUTPATIENT
Start: 2022-03-25 | End: 2022-03-25

## 2022-03-24 RX ORDER — BETHANECHOL CHLORIDE 25 MG/1
25 TABLET ORAL 3 TIMES DAILY
Status: DISCONTINUED | OUTPATIENT
Start: 2022-03-24 | End: 2022-03-26 | Stop reason: HOSPADM

## 2022-03-24 RX ORDER — HEPARIN SODIUM 10000 [USP'U]/100ML
12 INJECTION, SOLUTION INTRAVENOUS CONTINUOUS
Status: DISCONTINUED | OUTPATIENT
Start: 2022-03-24 | End: 2022-03-24 | Stop reason: HOSPADM

## 2022-03-24 RX ORDER — ATORVASTATIN CALCIUM 80 MG/1
80 TABLET, FILM COATED ORAL NIGHTLY
Status: DISCONTINUED | OUTPATIENT
Start: 2022-03-24 | End: 2022-03-26 | Stop reason: HOSPADM

## 2022-03-24 RX ORDER — CLOPIDOGREL BISULFATE 75 MG/1
300 TABLET ORAL ONCE
Status: COMPLETED | OUTPATIENT
Start: 2022-03-24 | End: 2022-03-24

## 2022-03-24 RX ORDER — POTASSIUM BICARBONATE 25 MEQ/1
50 TABLET, EFFERVESCENT ORAL ONCE
Status: DISCONTINUED | OUTPATIENT
Start: 2022-03-24 | End: 2022-03-24 | Stop reason: CLARIF

## 2022-03-24 RX ORDER — ASPIRIN 81 MG/1
81 TABLET ORAL DAILY
Status: DISCONTINUED | OUTPATIENT
Start: 2022-03-24 | End: 2022-03-24 | Stop reason: SDUPTHER

## 2022-03-24 RX ORDER — HEPARIN SODIUM 1000 [USP'U]/ML
4000 INJECTION, SOLUTION INTRAVENOUS; SUBCUTANEOUS PRN
Status: DISCONTINUED | OUTPATIENT
Start: 2022-03-24 | End: 2022-03-24 | Stop reason: HOSPADM

## 2022-03-24 RX ORDER — HEPARIN SODIUM 1000 [USP'U]/ML
10000 INJECTION, SOLUTION INTRAVENOUS; SUBCUTANEOUS PRN
Status: DISCONTINUED | OUTPATIENT
Start: 2022-03-24 | End: 2022-03-24

## 2022-03-24 RX ORDER — OXYCODONE HYDROCHLORIDE AND ACETAMINOPHEN 5; 325 MG/1; MG/1
1 TABLET ORAL EVERY 4 HOURS PRN
Status: DISCONTINUED | OUTPATIENT
Start: 2022-03-24 | End: 2022-03-26 | Stop reason: HOSPADM

## 2022-03-24 RX ORDER — ALBUTEROL SULFATE 90 UG/1
2 AEROSOL, METERED RESPIRATORY (INHALATION) EVERY 4 HOURS PRN
Status: DISCONTINUED | OUTPATIENT
Start: 2022-03-24 | End: 2022-03-24 | Stop reason: CLARIF

## 2022-03-24 RX ORDER — DEXTROSE MONOHYDRATE 50 MG/ML
100 INJECTION, SOLUTION INTRAVENOUS PRN
Status: DISCONTINUED | OUTPATIENT
Start: 2022-03-24 | End: 2022-03-26 | Stop reason: HOSPADM

## 2022-03-24 RX ORDER — SODIUM CHLORIDE 9 MG/ML
INJECTION, SOLUTION INTRAVENOUS CONTINUOUS
Status: ACTIVE | OUTPATIENT
Start: 2022-03-24 | End: 2022-03-24

## 2022-03-24 RX ORDER — HEPARIN SODIUM 1000 [USP'U]/ML
2000 INJECTION, SOLUTION INTRAVENOUS; SUBCUTANEOUS PRN
Status: DISCONTINUED | OUTPATIENT
Start: 2022-03-24 | End: 2022-03-24 | Stop reason: HOSPADM

## 2022-03-24 RX ADMIN — NITROGLYCERIN 0.4 MG: 0.4 TABLET SUBLINGUAL at 13:23

## 2022-03-24 RX ADMIN — ATORVASTATIN CALCIUM 80 MG: 80 TABLET, FILM COATED ORAL at 20:48

## 2022-03-24 RX ADMIN — POTASSIUM BICARBONATE 40 MEQ: 782 TABLET, EFFERVESCENT ORAL at 14:59

## 2022-03-24 RX ADMIN — NITROGLYCERIN 0.4 MG: 0.4 TABLET SUBLINGUAL at 13:17

## 2022-03-24 RX ADMIN — Medication 6.62 UNITS/KG/HR: at 14:36

## 2022-03-24 RX ADMIN — NITROGLYCERIN 5 MCG/MIN: 20 INJECTION INTRAVENOUS at 14:38

## 2022-03-24 RX ADMIN — METOPROLOL TARTRATE 25 MG: 25 TABLET, FILM COATED ORAL at 14:38

## 2022-03-24 RX ADMIN — POTASSIUM CHLORIDE 40 MEQ: 20 TABLET, EXTENDED RELEASE ORAL at 19:26

## 2022-03-24 RX ADMIN — CLOPIDOGREL BISULFATE 300 MG: 75 TABLET ORAL at 19:27

## 2022-03-24 RX ADMIN — SODIUM CHLORIDE: 9 INJECTION, SOLUTION INTRAVENOUS at 19:27

## 2022-03-24 RX ADMIN — INSULIN LISPRO 2 UNITS: 100 INJECTION, SOLUTION INTRAVENOUS; SUBCUTANEOUS at 19:27

## 2022-03-24 RX ADMIN — HEPARIN SODIUM AND DEXTROSE 5.03 UNITS/KG/HR: 10000; 5 INJECTION INTRAVENOUS at 13:43

## 2022-03-24 RX ADMIN — HEPARIN SODIUM 10000 UNITS: 1000 INJECTION INTRAVENOUS; SUBCUTANEOUS at 13:41

## 2022-03-24 ASSESSMENT — PAIN DESCRIPTION - FREQUENCY: FREQUENCY: CONTINUOUS

## 2022-03-24 ASSESSMENT — ENCOUNTER SYMPTOMS
NAUSEA: 0
ABDOMINAL PAIN: 0
RHINORRHEA: 0
SHORTNESS OF BREATH: 0
COUGH: 0
BACK PAIN: 0
COLOR CHANGE: 0
VOMITING: 0

## 2022-03-24 ASSESSMENT — PAIN DESCRIPTION - LOCATION: LOCATION: CHEST

## 2022-03-24 ASSESSMENT — PAIN DESCRIPTION - ONSET: ONSET: OTHER (COMMENT)

## 2022-03-24 ASSESSMENT — PAIN SCALES - GENERAL
PAINLEVEL_OUTOF10: 0
PAINLEVEL_OUTOF10: 3

## 2022-03-24 ASSESSMENT — PAIN DESCRIPTION - ORIENTATION: ORIENTATION: LEFT;RIGHT

## 2022-03-24 ASSESSMENT — PAIN DESCRIPTION - PAIN TYPE: TYPE: ACUTE PAIN

## 2022-03-24 NOTE — PROCEDURES
Procedure:    1. Left heart cath  2. Percutaneous coronary artery intervention of the mid RCA with a sequential and overlapping 4.5 x 12 and 4.0 x 28 mm drug-eluting stents. Complications: None    Physician: Tracey River DO. Assistant: none    Indication: STEMI  AUC: 9  AUC indication: 2    PCI AUC: 9  PCI AUC incication: 1    Anesthesia: 2% Xylocaine, intravenous fentanyl     Sedation: Intravenous Versed    Sedation time: I was present for sedation administration at 1608. I ended sedation at 1702 for a total face-to-face sedation time of 55 minutes. Estimated blood loss: Minimal    Specimens: none    Contrast used: 160 cc    Hemodynamics:  Opening Aortic pressure: 14/81  LV systolic pressure: 264  LVEDP: 18  No significant gradient across the aortic valve. Angiographic Results/findings:  Left Main: No angiographically significant stenosis. LAD: Mild diffuse luminal irregularities. D1: Mild diffuse luminal irregularities. D2: Mild diffuse luminal irregularities. .  Cx: Mild diffuse luminal irregularities. OM1: No angiographically significant stenosis. .  RCA: Huge, hyperdominant vessel. Mid long diffuse 95% stenosis with LEFTY II-III flow. .  PDA: No angiographically significant stenosis. Donzell Pyo PLB: Proximal to mid diffuse 40 to 50% stenosis. .  LV: Low normal global systolic function. No regional wall motion abnormalities. Ejection fraction 50%. .    Interventional results:  Mid RCA lesion  PrePCI LEFTY 2-3 flow. Successful predilatation of the mid RCA lesion with a 3.0 mm balloon. This lesion was then crossed and stented with a 4.0 x 28 mm drug-eluting stent to 14 atmospheres of pressure. This resulted in 0 percent residual stenosis with LEFTY-3 flow. The stent was a little short to cover the proximal portion of the lesion.   Therefore a second 4.5 x 12 mm drug-eluting stent was placed in an overlapping fashion proximal to the first stent and covering an aneurysmal formation that was proximal to the lesion. This was deployed to 12 malathi pressure. The overlapping portion of the 2 stents was then postdilated with that second stent balloon to 14 malathi pressure twice. This resulted in 0% residual stenosis and LEFTY-3 flow. Summary of the procedure:  He was taken emergently to the cardiac Cath Lab where the area over the right radial artery was prepped and draped in a sterile fashion. Using ultrasound guidance and a micropuncture technique a 6 Bangladeshi slender rain sheath was placed in the right radial artery. This was aspirated & flushed several times throughout the procedure. This was medicated with verapamil nitroglycerin. A 5 f TIG diagnostic catheter was advanced over a wire to the root of the aorta. It was aspirated & flushed with saline. Pressures were obtained. This was then filled with contrast.  This was then manipulated into the left main coronary artery. 4 orthogonal views were obtained. A 5 f TIG diagnostic catheter was advanced & manipulated into the RCA using the same technique. An Armenian view was obtained. A 5 f angled pigtail was then advanced & manipulated into the LV. This was then aspirated & flushed with saline & pressures were obtained. An ZAMUDIO view was then obtained. The catheter was then aspirated & flushed with saline once again & pull back pressures were then obtained across the aortic vlave. They were then anticoagulated with heparin to maintain an ACT greater than 250. A 6 f R4 guiding catheter was used. They were already on ASA & they were loaded with Brilinta. A luge wire was advanced across the mid RCA lesion and placed in the distal vessel. The lesion was then crossed and predilated with a 3.0 mm balloon. The lesion was then crossed and stented with a 4.0 x 28 mm drug-eluting stent inflated to 14 atmospheres of pressure. This resulted in 0 percent residual stenosis LEFTY 3 flow. This stent was a little too short to cover the very proximal end of the lesion. There was also an aneurysm just proximal to the lesion. Therefore a 4.5 x 12 mm drug-eluting stent was placed in an overlapping portion proximal to the first stent. This was deployed to 12 malathi pressure. The overlapping portion of the 2 stents was then postdilated twice with that stent balloon to 14 malathi pressure. The balloon and wire were removed. A follow-up angiogram was performed. This demonstrated 0% residual stenosis and excellent LEFTY-3 flow. The radial sheath was removed and vas band placed with good patent hemostasis. They tolerated the procedure well with no complications. Note: This report was completed using computerized voice recognition software. Every effort has been made to ensure accuracy, however; and invert and computerized transcription errors may be present.

## 2022-03-24 NOTE — ED PROVIDER NOTES
Presents to the ED for evaluation of chest pain. Chest pain started around 1130 this morning. Described as a pressure in the center of his chest.  He also has associated sharp pain in both shoulders. He states the pain occasionally is in the left shoulder and then goes to the right shoulder. Right now is currently in the left shoulder. Described as being sharp in nature. Denies any ripping or tearing pains in his chest or back. Denies any pain between her shoulder blades. Denies any abdominal pain. Denies dizziness or lightness. States the pain was starting to ease up when EMS arrived. They gave him a sublingual nitroglycerin which added additional benefit. He was also given aspirin in route. States he has no history of heart disease. Denies being short of breath or nausea. Denies diaphoresis. Patient does report history of diabetes and hypertension. Non-smoker. Review of Systems   Constitutional: Negative for chills, diaphoresis, fatigue and fever. HENT: Negative for congestion and rhinorrhea. Eyes: Negative for visual disturbance. Respiratory: Negative for cough and shortness of breath. Cardiovascular: Positive for chest pain. Negative for palpitations and leg swelling. Gastrointestinal: Negative for abdominal pain, nausea and vomiting. Genitourinary: Negative for decreased urine volume, difficulty urinating and flank pain. Musculoskeletal: Negative for back pain and myalgias. Skin: Negative for color change and pallor. Neurological: Negative for dizziness, syncope, light-headedness and numbness. Hematological: Does not bruise/bleed easily. All other systems reviewed and are negative. Physical Exam  Vitals and nursing note reviewed. Constitutional:       General: He is not in acute distress. Appearance: He is well-developed. He is obese. He is not ill-appearing or diaphoretic. HENT:      Head: Normocephalic and atraumatic.    Eyes: Conjunctiva/sclera: Conjunctivae normal.   Cardiovascular:      Rate and Rhythm: Normal rate and regular rhythm. Heart sounds: Normal heart sounds. No murmur heard. Pulmonary:      Effort: Pulmonary effort is normal. No respiratory distress. Breath sounds: Normal breath sounds. No wheezing or rales. Abdominal:      General: Bowel sounds are normal.      Palpations: Abdomen is soft. There is no mass ( No pulsatile abdominal mass). Tenderness: There is no abdominal tenderness. There is no guarding or rebound. Musculoskeletal:         General: No tenderness ( No reproducible tenderness in bilateral shoulders. Normal range of motion. ). Cervical back: Normal range of motion and neck supple. Comments: There is no pretibial edema nor calf tenderness bilaterally      Skin:     General: Skin is warm and dry. Neurological:      Mental Status: He is alert and oriented to person, place, and time. Procedures     MDM   Patient presents to the ED for evaluation of chest pain. Patient states that started this morning. Improved slightly after given nitroglycerin by EMS. He was also given a full dose aspirin by EMS. Upon arrival we noticed patient's EKG was abnormal with ST elevation noted in the lead III. Slight changes noted in lead II. No elevations in aVF. I did consult cardiology and faxed the EKG to him which he evaluated. He agreed that there were some concerning findings but nothing that indicated a STEMI at this point. He had to wait for the troponin and also a repeat EKG. I did come back with a elevated troponin I 127. As well as EKG which is now more concerning for a STEMI. At this time he agreed to activate Cath Lab. We did so and patient has already been started on heparin drip and was given a bolus. He is also can be started on metoprolol and nitroglycerin drip. He was completely chest pain-free after 1 nitroglycerin in the ED. Additional labs were obtained. CBC showed no runs of leukocytosis or anemia. His BMP showed no electrolyte abnormalities other than mild hypokalemia at 3.1. He will be given potassium in the ED. Chest x-ray showed no acute cardiopulmonary disease. Patient is can be transferred to [facility] for further treatment evaluation. EKG Interpretation @ [id number]    Interpreted by emergency department physician    Rhythm: normal sinus   Rate: Borderline tachycardia  Axis: left  Ectopy: none  Conduction: right bundle branch block (incomplete)  ST Segments: ST elevation noted in lead III. No ST depressions noted. This is new from prior EKG in 2020. T Waves: normal  Q Waves: none    Clinical Impression: Normal sinus rhythm with right bundle-branch block. ST elevations noted in lead III. Question elevation in lead II. These 2 findings are new from prior EKG in 2020 were discussed with interventional cardiologist.    Sriram Cabral DO       EKG Interpretation @ [id number] [phone]    Interpreted by emergency department physician    Rhythm: normal sinus   Rate: normal  Axis: left  Ectopy: none  Conduction: right bundle branch block (incomplete)  ST Segments: ST elevations noted in lead II and III as well as subtle elevation in aVF  T Waves: no acute change  Q Waves: none    Clinical Impression: ST elevations noted in the inferior leads. Sriram Cabral DO     ED Course as of 03/24/22 1451   Thu Mar 24, 2022   1329 Spoke with Dr. Aster James (Cardiology). Discussed case. He states that the EKG is concerning especially lead III. He would like to wait and see a troponin. He would like the patient to be started on heparin and would like a repeat EKG and a call back once the troponin results. [MS]   [phone] Patient is now currently chest pain-free after 1 sublingual nitroglycerin. Rates his pain a 0 out of 10. Updated the patient and family present on current plan. [MS]   1413 Patient's initial troponin is 127. I did order repeat.   Patient is now getting a repeat EKG. Once I get the results I will contact Dr. Jumana Donnelly with the new EKG. [MS]   [phone] With the interventional cardiologist.  Faxing the repeat EKG as well as reviewed the troponin with him. He agrees that EKG now is more concerning. Will transfer patient to Cath Lab for heart cath. He recommended nitroglycerin IV as well as a beta-blocker. Patient is agreeable to transfer. [MS]   1447 Reports that his pain is starting to slightly come back. The nitroglycerin will be started soon. He is agreeable to transfer. [MS]   1451 We were unable to get ground transport. We tried multiple providers but no one has any availability. We did contact [facility] and the patient will be flown to [place] [address]. [MS]      ED Course User Index  [MS] Carlo Londono, DO       --------------------------------------------- PAST HISTORY ---------------------------------------------  Past Medical History:  has a past medical history of Arthritis, Asthma, Atrial fibrillation ([facility] H/O cardiovascular stress test, Hypertension, and Sleep apnea. Past Surgical History:  has a past surgical history that includes Cholecystectomy (1995); Tonsillectomy; TURP (08/04/2017); TURP (N/A, 6/5/2020); and Prostatectomy (N/A, 10/12/2020). Social History:  reports that he has never smoked. He has never used smokeless tobacco. He reports previous alcohol use. He reports that he does not use drugs. Family History: family history includes Cancer in his brother and mother; Heart Disease in his father; No Known Problems in his brother. The patients home medications have been reviewed.     Allergies: Penicillins    -------------------------------------------------- RESULTS -------------------------------------------------    LABS:  Results for orders placed or performed during the hospital encounter of 03/24/22   Troponin   Result Value Ref Range    Troponin, High Sensitivity 127 (H) 0 - 11 ng/L   CBC with Auto Differential   Result Value Ref Range    WBC 7.4 4.5 - 11.5 E9/L    RBC 4.99 3.80 - 5.80 E12/L    Hemoglobin 14.9 12.5 - 16.5 g/dL    Hematocrit 43.0 37.0 - 54.0 %    MCV 86.2 80.0 - 99.9 fL    MCH 29.9 26.0 - 35.0 pg    MCHC 34.7 (H) 32.0 - 34.5 %    RDW 14.2 11.5 - 15.0 fL    Platelets 942 343 - 477 E9/L    MPV 9.4 7.0 - 12.0 fL    Neutrophils % 71.2 43.0 - 80.0 %    Immature Granulocytes % 0.8 0.0 - 5.0 %    Lymphocytes % 19.8 (L) 20.0 - 42.0 %    Monocytes % 5.5 2.0 - 12.0 %    Eosinophils % 2.0 0.0 - 6.0 %    Basophils % 0.7 0.0 - 2.0 %    Neutrophils Absolute 5.28 1.80 - 7.30 E9/L    Immature Granulocytes # 0.06 E9/L    Lymphocytes Absolute 1.47 (L) 1.50 - 4.00 E9/L    Monocytes Absolute 0.41 0.10 - 0.95 E9/L    Eosinophils Absolute 0.15 0.05 - 0.50 E9/L    Basophils Absolute 0.05 0.00 - 0.20 H1/M   Basic Metabolic Panel w/ Reflex to MG   Result Value Ref Range    Sodium 137 132 - 146 mmol/L    Potassium reflex Magnesium 3.1 (L) 3.5 - 5.0 mmol/L    Chloride 101 98 - 107 mmol/L    CO2 22 22 - 29 mmol/L    Anion Gap 14 7 - 16 mmol/L    Glucose 261 (H) 74 - 99 mg/dL    BUN 13 6 - 23 mg/dL    CREATININE 0.8 0.7 - 1.2 mg/dL    GFR Non-African American >60 >=60 mL/min/1.73    GFR African American >60     Calcium 9.0 8.6 - 10.2 mg/dL   APTT   Result Value Ref Range    aPTT 27.2 24.5 - 35.1 sec   Magnesium   Result Value Ref Range    Magnesium 1.9 1.6 - 2.6 mg/dL   EKG 12 Lead   Result Value Ref Range    Ventricular Rate 79 BPM    Atrial Rate 79 BPM    P-R Interval 180 ms    QRS Duration 140 ms    Q-T Interval 428 ms    QTc Calculation (Bazett) 490 ms    P Axis 46 degrees    R Axis -88 degrees    T Axis 10 degrees       RADIOLOGY:  XR CHEST PORTABLE   Final Result   No acute process.                ------------------------- NURSING NOTES AND VITALS REVIEWED ---------------------------  Date / Time Roomed:  3/24/2022 12:58 PM  ED Bed Assignment:  09/09    The nursing notes within the ED encounter and vital signs as below have been reviewed. Patient Vitals for the past 24 hrs:   BP Temp Temp src Pulse Resp SpO2 Height Weight   03/24/22 1439 (!) 158/86   81 20 92 %     03/24/22 1347 134/78   98 20 93 %     03/24/22 1305       6' 3\" (1.905 m) (!) 333 lb (151 kg)   03/24/22 1301 (!) 172/107 98 °F (36.7 °C) Oral 109 18 91 %         Oxygen Saturation Interpretation: Normal    ------------------------------------------ PROGRESS NOTES ------------------------------------------  Re-evaluation(s):  Refer to ED course above. Counseling:  I have spoken with the patient and discussed todays results, in addition to providing specific details for the plan of care and counseling regarding the diagnosis and prognosis. Their questions are answered at this time and they are agreeable with the plan of admission.    --------------------------------- ADDITIONAL PROVIDER NOTES ---------------------------------  Consultations:  Time: 1422. Spoke with Dr. Pavel Napoles. Discussed case. He will accept patient to cath lab. This patient's ED course included: a personal history and physicial examination, re-evaluation prior to disposition, multiple bedside re-evaluations, IV medications, cardiac monitoring, continuous pulse oximetry and complex medical decision making and emergency management    Critical care:  Please note that the withdrawal or failure to initiate urgent interventions for this patient would likely result in a life threatening deterioration or permanent disability. Systems at risk for deterioration include: Inferior STEMI requiring IV heparin bolus and infusion as well as consult with cardiology and transfer to higher level of care for heart catheterization. Accordingly this patient received 49 minutes of critical care time, excluding separately billable procedures. This patient has remained hemodynamically stable during their ED course. Diagnosis:  1.  ST elevation myocardial infarction (STEMI), unspecified artery ([facility]    2. Hypokalemia        Disposition:  Patient's disposition: transfer to cath lab  Patient's condition is fair.            Valentin Dillard, DO  03/24/22 Darrell Jett, DO  03/24/22 Darrell Jett, DO  03/24/22 1456

## 2022-03-24 NOTE — ED NOTES
Patient 90% room air. 3l NC placed.   93%     Ted Caraballo, RORO  03/24/22 Yanna [id number], RN  03/24/22 [id number]

## 2022-03-24 NOTE — CONSULTS
Cardiology critical care consult note    Reason for consultation:  Inferior STEMI. History chief complaint: This is a 61-year-old gentleman with a past medical history of diabetes, asthma, and hypertension. He also states that in 2020 he had sepsis after a TURP. They thought he may have had a brief episode of paroxysmal atrial fibrillation and he was placed on anticoagulation at that time. However, subsequent to that he states that he had an echocardiogram and a stress test in 1 and he was told that these were both negative. He was also told that he did not have atrial fibrillation and his anticoagulation was discontinued. He states that he has been having intermittent episodes of chest discomfort over the past week. He states that these episodes lasted for 1 to 2 hours at a time. He was seen by his primary care physician on Tuesday, he states that an ECG was performed and he was told that this was negative. He also was then seen at an urgent care yesterday for chest discomfort. The chest discomfort then resolved and he was discharged home. The chest discomfort recurred again this morning. He then presented to [address] [place] [address] emergency room. An ECG was performed shows a right bundle branch block which is unchanged for him. He did have mild ST elevations but only in 1-lead (3). He was therefore placed on aspirin, heparin, and nitroglycerin. He then became completely pain-free however, repeat ECG then demonstrated worsening of the ST elevations in leads III and aVF. Even with this ECG changes he remain pain-free. Therefore, I have been in conference with his emergency room physician. I thought that he was at least having an acute coronary syndrome and intermittently having an inferior STEMI. I therefore recommended the above treatment as a stat transfer to the cardiac Cath Lab and was [facility] hospital.  It took 2 hours to transfer him. Upon arrival he continues to be asymptomatic.     Allergies: No known allergies. Medications: Lisinopril, hydrochlorothiazide, Jardiance, as needed albuterol, Hytrin, bethanechol. Past medical history: As below. Surgical history: Unobtainable. Family history: Unobtainable. Social history: Unobtainable. Physical exam:  General: He is alert and oriented x3, communicates well, in no distress. Vitals: Stable with PVCs  Neck: Thick, short, multiple chins, difficult to examine. Heart: Distant to auscultation. Regular rate and rhythm. Normal S1 and S2. I do not appreciate any murmurs. Lungs: Clear to auscultation bilaterally. Mildly decreased air movement. Abdomen: Super morbidly obese. Soft, nontender. Extremities: Full range of motion x4. Good distal pulses. Eyes: Extraocular muscles intact. Normal lids and conjunctiva. Skin: Warm, dry, intact. ECG: Sinus rhythm, 79 bpm.  Right bundle branch block. Q waves in the inferior leads. ST elevations in 3 and aVF. His ECG in June 2020 also demonstrated right bundle branch block with inferior Q waves. Labs:  Sodium 137, potassium 3.1, creatinine 0.8  Troponin I 44  Hemoglobin 14.9    Assessment:  1. Acute coronary syndrome. Probable intermittent inferior STEMI. 2. Morbid obesity. 3. Asthma. 4. Diabetes. 5. Hypertension. 6. BPH. Recommendations:  1. Aspirin  2. Heparin  3. Nitroglycerin  4. Beta-blocker  5. Statin  6. Stat cardiac catheterization possible PCI. 56 minutes critical care time.

## 2022-03-24 NOTE — ED NOTES
Pt reports bilateral shoulder pain 3/10 \"gone\" after second sl nitro- reports \"less than 1\" tightness in throat.  bp 126/64     Sarahi Godwin RN  03/24/22 0481

## 2022-03-24 NOTE — H&P
Hospitalist History & Physical      PCP: Milana Prieto MD    Date of Admission: 3/24/2022    Date of Service: Pt seen/examined on 3/24/2022 and was admitted to Inpatient with expected LOS greater than two midnights due to medical therapy. Chief Complaint:  had no chief complaint listed for this encounter. History Of Present Illness:    Mr. Maria Dolores Haddad, a 73y.o. year old male  who  has a past medical history of Arthritis, Asthma, Atrial fibrillation ([address] Diabetes mellitus ([address] H/O cardiovascular stress test, Hypertension, and Sleep apnea. Patient was [facility] form [facility] ED after been diagnosed with STEMI. Came straight to cardiac cath lab. Seen after procedure. Patient was having chest pain for a week but short duration. Yesterday lasted longer and went to urgent care but resolved and he was sent back home. Today pain returned, severe and lasted 30 min so he called 911 and was taken to [facility] ED where EKG showed ST elevation in one lead, given ASA, heparin drip and NGT. On repeat EKG he has new changes beingn pain free at the time.  Cardiology Dr Chelita Frost discussed case with [facility] ED attending and patient was transferred for urgent cath  IM was called for admission       Past Medical History:   Diagnosis Date    Arthritis     lower back    Asthma     Atrial fibrillation ([address]     Diabetes mellitus ([address]     diet controlled    H/O cardiovascular stress test 06/26/2020    Lexiscan    Hypertension     Sleep apnea        Past Surgical History:   Procedure Laterality Date    CHOLECYSTECTOMY  1995    PROSTATECTOMY N/A 10/12/2020    LAPAROSCOPIC ROBOTIC ASSISTED BLADDER DIVERTICULECTOMY WITH FLEXIBLE  CYSTOSCOPY performed by Ricky Alvarado MD at [facility] TURP  08/04/2017    cr    TURP N/A 6/5/2020    CYSTOSCOPY, RETROGRADE, PYELOGRAM. CYSTOGRAM. TRANSURETHRAL RESECTION PROSTATE performed by Vaughn Rouse Enrrique, DO at [address]       Prior to Admission medications    Medication Sig Start Date End Date Taking? Authorizing Provider   terazosin (HYTRIN) 10 MG capsule nightly  7/2/20   Historical Provider, MD   rivaroxaban (XARELTO) 20 MG TABS tablet Take 1 tablet by mouth daily 7/23/20 10/21/20  Criselda Jessica MD   lisinopril-hydroCHLOROthiazide (PRINZIDE;ZESTORETIC) 20-12.5 MG per tablet Take 2 tablets by mouth every morning    Historical Provider, MD   empagliflozin (JARDIANCE) 10 MG tablet Take 10 mg by mouth daily    Historical Provider, MD   albuterol sulfate HFA (VENTOLIN HFA) 108 (90 Base) MCG/ACT inhaler Inhale 2 puffs into the lungs every 4 hours as needed for Wheezing or Shortness of Breath    Historical Provider, MD   bethanechol (URECHOLINE) 25 MG tablet Take 25 mg by mouth 3 times daily    Historical Provider, MD   Multiple Vitamins-Minerals (CENTRUM/CERTA-ANABEL WITH MINERALS ORAL) solution Take 15 mLs by mouth daily    Historical Provider, MD   sodium chloride (OCEAN, BABY AYR) 0.65 % nasal spray 1 spray by Nasal route as needed for Congestion    Historical Provider, MD   oxymetazoline (AFRIN) 0.05 % nasal spray 2 sprays by Nasal route 2 times daily    Historical Provider, MD   diphenhydrAMINE (BENADRYL) 25 MG tablet Take 50 mg by mouth every 6 hours as needed for Itching    Historical Provider, MD         Allergies:  Penicillins    Social History:    TOBACCO:   reports that he has never smoked. He has never used smokeless tobacco.  ETOH:   reports previous alcohol use. Family History:    Reviewed in detail and negative for DM, CAD, Cancer, CVA. Positive as follows\"      Problem Relation Age of Onset    Cancer Mother         thyroid    Heart Disease Father     Cancer Brother         prostate    No Known Problems Brother        REVIEW OF SYSTEMS:   Pertinent positives as noted in the HPI. All other systems reviewed and negative.     PHYSICAL EXAM:    VS reviewed   General appearance: No apparent distress, appears stated age and cooperative. Increased BMI  HEENT: Normal cephalic, atraumatic without obvious deformity. Pupils equal, round, and reactive to light. Extra ocular muscles intact. Conjunctivae/corneas clear. Neck: Supple, with full range of motion. No jugular venous distention. Trachea midline. Respiratory:  Normal respiratory effort, no crackle, wheezing  Cardiovascular: RRR, no M/G/R  Abdomen: increased abdominal grid, ND, normal BS, NT  Musculoskeletal: No clubbing, cyanosis, edema of bilateral lower extremities. Brisk capillary refill. Skin: Normal skin color. No rashes or lesions. Neurologic:  Neurovascularly intact without any focal sensory/motor deficits. Cranial nerves: II-XII intact, grossly non-focal.  Psychiatric: Alert and oriented, thought content appropriate, normal insight    Reviewed EKG and CXR personally      CBC:   Recent Labs     03/24/22  1319   WBC 7.4   RBC 4.99   HGB 14.9   HCT 43.0   MCV 86.2   RDW 14.2        BMP:   Recent Labs     03/24/22  1319      K 3.1*      CO2 22   BUN 13   CREATININE 0.8   MG 1.9     LFT:  No results for input(s): PROT, ALB, ALKPHOS, ALT, AST, BILITOT, AMYLASE, LIPASE in the last 72 hours. CE:  No results for input(s): Jackie Councilman in the last 72 hours. PT/INR:   Recent Labs     03/24/22  1329   APTT 27.2     BNP: No results for input(s): BNP in the last 72 hours.   ESR: No results found for: SEDRATE  CRP: No results found for: CRP  D Dimer: No results found for: DDIMER   Folate and B12: No results found for: [id number], No results found for: FOLATE  Lactic Acid: No results found for: LACTA  Thyroid Studies: No results found for: TSH, O6XFMLH, O1YMDJP, THYROIDAB    Oupatient labs:  Lab Results   Component Value Date    PSA 1.76 06/21/2021    INR 1.3 06/24/2020    LABA1C 8.4 (H) 06/26/2020       Urinalysis:    Lab Results   Component Value Date    NITRU Negative 07/13/2020    WBCUA 5-10 07/13/2020    BACTERIA FEW 07/13/2020    RBCUA >20 07/13/2020 BLOODU LARGE 07/13/2020    SPECGRAV 1.020 07/13/2020    GLUCOSEU >=1000 07/13/2020       Imaging:  XR CHEST PORTABLE    Result Date: 3/24/2022  EXAMINATION: ONE XRAY VIEW OF THE CHEST 3/24/2022 1:36 pm COMPARISON: 06/24/2020 HISTORY: ORDERING SYSTEM PROVIDED HISTORY: cp TECHNOLOGIST PROVIDED HISTORY: Reason for exam:->cp FINDINGS: The lungs are without acute focal process. There is no effusion or pneumothorax. The cardiomediastinal silhouette is without acute process. The osseous structures are without acute process. No acute process. ASSESSMENT:    Principal Problem:    CAD in native artery  Resolved Problems:    * No resolved hospital problems. *  CAD on native artery  STEMI s/p cardiac cath   Paroxysmal A fibrillation of anticoagulation now  HTN  Obesity  Diabetes Mellitus type 2  Asthma  PANCHO on QHS CPAP settings \"11\"  BPH    PLAN:    Cardiac unit admission  Fasting lipid panel, A1C, electrolytes, renal function   DAPT  Follow up Cardiology recommendations      Diet: No diet orders on file  Code Status: Prior  Surrogate decision maker confirmed with patient:   Extended Emergency Contact Information  Primary Emergency Contact: An Rand  Address: [address] [place] [address] Phone: [phone]  Mobile Phone: [phone]  Relation: Spouse   needed? No    DVT Prophylaxis: []Lovenox []Heparin []PCD [] [address] []Encouraged ambulation  Disposition: []Med/Surg [] Intermediate [] ICU/CCU  Admit status: [] Observation [] Inpatient     +++++++++++++++++++++++++++++++++++++++++++++++++  Magda Car MD  [employer]ician - 2020 [facility], [facility]  +++++++++++++++++++++++++++++++++++++++++++++++++  NOTE: This report was transcribed using voice recognition software. Every effort was made to ensure accuracy; however, inadvertent computerized transcription errors may be present.

## 2022-03-25 ENCOUNTER — TELEPHONE (OUTPATIENT)
Dept: CARDIOLOGY CLINIC | Age: 72
End: 2022-03-25

## 2022-03-25 LAB
ANION GAP SERPL CALCULATED.3IONS-SCNC: 13 MMOL/L (ref 7–16)
BUN BLDV-MCNC: 13 MG/DL (ref 6–23)
CALCIUM SERPL-MCNC: 8.6 MG/DL (ref 8.6–10.2)
CHLORIDE BLD-SCNC: 103 MMOL/L (ref 98–107)
CHOLESTEROL, TOTAL: 174 MG/DL (ref 0–199)
CO2: 22 MMOL/L (ref 22–29)
CREAT SERPL-MCNC: 0.9 MG/DL (ref 0.7–1.2)
EKG ATRIAL RATE: 100 BPM
EKG ATRIAL RATE: 106 BPM
EKG ATRIAL RATE: 96 BPM
EKG P AXIS: 22 DEGREES
EKG P AXIS: 52 DEGREES
EKG P AXIS: 64 DEGREES
EKG P-R INTERVAL: 144 MS
EKG P-R INTERVAL: 146 MS
EKG P-R INTERVAL: 192 MS
EKG Q-T INTERVAL: 380 MS
EKG Q-T INTERVAL: 400 MS
EKG Q-T INTERVAL: 402 MS
EKG QRS DURATION: 128 MS
EKG QRS DURATION: 128 MS
EKG QRS DURATION: 136 MS
EKG QTC CALCULATION (BAZETT): 504 MS
EKG QTC CALCULATION (BAZETT): 507 MS
EKG QTC CALCULATION (BAZETT): 516 MS
EKG R AXIS: -86 DEGREES
EKG R AXIS: -87 DEGREES
EKG R AXIS: -88 DEGREES
EKG T AXIS: 32 DEGREES
EKG T AXIS: 37 DEGREES
EKG T AXIS: 41 DEGREES
EKG VENTRICULAR RATE: 100 BPM
EKG VENTRICULAR RATE: 106 BPM
EKG VENTRICULAR RATE: 96 BPM
GFR AFRICAN AMERICAN: >60
GFR NON-AFRICAN AMERICAN: >60 ML/MIN/1.73
GLUCOSE BLD-MCNC: 168 MG/DL (ref 74–99)
HBA1C MFR BLD: 9.2 % (ref 4–5.6)
HDLC SERPL-MCNC: 35 MG/DL
LDL CHOLESTEROL CALCULATED: 102 MG/DL (ref 0–99)
LV EF: 55 %
LVEF MODALITY: NORMAL
MAGNESIUM: 2.1 MG/DL (ref 1.6–2.6)
METER GLUCOSE: 151 MG/DL (ref 74–99)
METER GLUCOSE: 166 MG/DL (ref 74–99)
METER GLUCOSE: 187 MG/DL (ref 74–99)
POTASSIUM SERPL-SCNC: 3.3 MMOL/L (ref 3.5–5)
POTASSIUM SERPL-SCNC: 3.8 MMOL/L (ref 3.5–5)
SODIUM BLD-SCNC: 138 MMOL/L (ref 132–146)
TRIGL SERPL-MCNC: 186 MG/DL (ref 0–149)
VLDLC SERPL CALC-MCNC: 37 MG/DL

## 2022-03-25 PROCEDURE — 6370000000 HC RX 637 (ALT 250 FOR IP): Performed by: STUDENT IN AN ORGANIZED HEALTH CARE EDUCATION/TRAINING PROGRAM

## 2022-03-25 PROCEDURE — 82962 GLUCOSE BLOOD TEST: CPT

## 2022-03-25 PROCEDURE — 93306 TTE W/DOPPLER COMPLETE: CPT

## 2022-03-25 PROCEDURE — 80048 BASIC METABOLIC PNL TOTAL CA: CPT

## 2022-03-25 PROCEDURE — 99222 1ST HOSP IP/OBS MODERATE 55: CPT | Performed by: INTERNAL MEDICINE

## 2022-03-25 PROCEDURE — 83036 HEMOGLOBIN GLYCOSYLATED A1C: CPT

## 2022-03-25 PROCEDURE — 93005 ELECTROCARDIOGRAM TRACING: CPT | Performed by: INTERNAL MEDICINE

## 2022-03-25 PROCEDURE — 6370000000 HC RX 637 (ALT 250 FOR IP): Performed by: NURSE PRACTITIONER

## 2022-03-25 PROCEDURE — 80061 LIPID PANEL: CPT

## 2022-03-25 PROCEDURE — 36415 COLL VENOUS BLD VENIPUNCTURE: CPT

## 2022-03-25 PROCEDURE — 93010 ELECTROCARDIOGRAM REPORT: CPT | Performed by: INTERNAL MEDICINE

## 2022-03-25 PROCEDURE — APPSS45 APP SPLIT SHARED TIME 31-45 MINUTES: Performed by: NURSE PRACTITIONER

## 2022-03-25 PROCEDURE — 94660 CPAP INITIATION&MGMT: CPT

## 2022-03-25 PROCEDURE — 83735 ASSAY OF MAGNESIUM: CPT

## 2022-03-25 PROCEDURE — 6370000000 HC RX 637 (ALT 250 FOR IP): Performed by: FAMILY MEDICINE

## 2022-03-25 PROCEDURE — 6370000000 HC RX 637 (ALT 250 FOR IP): Performed by: INTERNAL MEDICINE

## 2022-03-25 PROCEDURE — 84132 ASSAY OF SERUM POTASSIUM: CPT

## 2022-03-25 PROCEDURE — 2140000000 HC CCU INTERMEDIATE R&B

## 2022-03-25 RX ORDER — ASPIRIN 81 MG/1
81 TABLET ORAL DAILY
Qty: 30 TABLET | Refills: 3 | Status: SHIPPED | OUTPATIENT
Start: 2022-03-26

## 2022-03-25 RX ORDER — DOXAZOSIN 8 MG/1
8 TABLET ORAL DAILY
Qty: 30 TABLET | Refills: 3 | Status: SHIPPED | OUTPATIENT
Start: 2022-03-25 | End: 2022-04-20

## 2022-03-25 RX ORDER — ATORVASTATIN CALCIUM 80 MG/1
80 TABLET, FILM COATED ORAL NIGHTLY
Qty: 30 TABLET | Refills: 3 | Status: SHIPPED | OUTPATIENT
Start: 2022-03-25

## 2022-03-25 RX ORDER — METOPROLOL SUCCINATE 50 MG/1
50 TABLET, EXTENDED RELEASE ORAL DAILY
Qty: 30 TABLET | Refills: 3 | Status: SHIPPED | OUTPATIENT
Start: 2022-03-26 | End: 2022-03-26 | Stop reason: HOSPADM

## 2022-03-25 RX ORDER — METOPROLOL SUCCINATE 50 MG/1
50 TABLET, EXTENDED RELEASE ORAL DAILY
Status: DISCONTINUED | OUTPATIENT
Start: 2022-03-26 | End: 2022-03-26

## 2022-03-25 RX ORDER — PRASUGREL 10 MG/1
10 TABLET, FILM COATED ORAL DAILY
Status: DISCONTINUED | OUTPATIENT
Start: 2022-03-26 | End: 2022-03-26 | Stop reason: HOSPADM

## 2022-03-25 RX ORDER — PRASUGREL 10 MG/1
60 TABLET, FILM COATED ORAL ONCE
Status: COMPLETED | OUTPATIENT
Start: 2022-03-25 | End: 2022-03-25

## 2022-03-25 RX ORDER — LISINOPRIL 10 MG/1
10 TABLET ORAL DAILY
Status: DISCONTINUED | OUTPATIENT
Start: 2022-03-25 | End: 2022-03-26

## 2022-03-25 RX ORDER — NITROGLYCERIN 0.4 MG/1
0.4 TABLET SUBLINGUAL EVERY 5 MIN PRN
Status: DISCONTINUED | OUTPATIENT
Start: 2022-03-25 | End: 2022-03-26 | Stop reason: HOSPADM

## 2022-03-25 RX ORDER — LISINOPRIL 10 MG/1
10 TABLET ORAL DAILY
Qty: 30 TABLET | Refills: 3 | Status: SHIPPED | OUTPATIENT
Start: 2022-03-25 | End: 2022-03-26 | Stop reason: HOSPADM

## 2022-03-25 RX ORDER — PRASUGREL 10 MG/1
10 TABLET, FILM COATED ORAL DAILY
Qty: 30 TABLET | Refills: 0 | Status: SHIPPED | OUTPATIENT
Start: 2022-03-26 | End: 2022-04-25

## 2022-03-25 RX ORDER — POTASSIUM CHLORIDE 20 MEQ/1
40 TABLET, EXTENDED RELEASE ORAL ONCE
Status: COMPLETED | OUTPATIENT
Start: 2022-03-25 | End: 2022-03-25

## 2022-03-25 RX ORDER — NITROGLYCERIN 0.4 MG/1
TABLET SUBLINGUAL
Qty: 25 TABLET | Refills: 3 | Status: SHIPPED | OUTPATIENT
Start: 2022-03-25

## 2022-03-25 RX ORDER — DOXAZOSIN MESYLATE 4 MG/1
8 TABLET ORAL DAILY
Status: DISCONTINUED | OUTPATIENT
Start: 2022-03-25 | End: 2022-03-26 | Stop reason: HOSPADM

## 2022-03-25 RX ADMIN — CLOPIDOGREL BISULFATE 75 MG: 75 TABLET ORAL at 08:21

## 2022-03-25 RX ADMIN — ATORVASTATIN CALCIUM 80 MG: 80 TABLET, FILM COATED ORAL at 19:49

## 2022-03-25 RX ADMIN — INSULIN LISPRO 2 UNITS: 100 INJECTION, SOLUTION INTRAVENOUS; SUBCUTANEOUS at 17:48

## 2022-03-25 RX ADMIN — DOXAZOSIN 8 MG: 4 TABLET ORAL at 19:50

## 2022-03-25 RX ADMIN — INSULIN LISPRO 2 UNITS: 100 INJECTION, SOLUTION INTRAVENOUS; SUBCUTANEOUS at 11:42

## 2022-03-25 RX ADMIN — ASPIRIN 81 MG: 81 TABLET, COATED ORAL at 08:21

## 2022-03-25 RX ADMIN — PRASUGREL 60 MG: 10 TABLET, FILM COATED ORAL at 17:48

## 2022-03-25 RX ADMIN — DOXAZOSIN 8 MG: 4 TABLET ORAL at 00:58

## 2022-03-25 RX ADMIN — METOPROLOL SUCCINATE 25 MG: 25 TABLET, EXTENDED RELEASE ORAL at 08:20

## 2022-03-25 RX ADMIN — INSULIN LISPRO 1 UNITS: 100 INJECTION, SOLUTION INTRAVENOUS; SUBCUTANEOUS at 20:01

## 2022-03-25 RX ADMIN — POTASSIUM CHLORIDE 40 MEQ: 20 TABLET, EXTENDED RELEASE ORAL at 11:42

## 2022-03-25 RX ADMIN — LISINOPRIL 10 MG: 10 TABLET ORAL at 17:48

## 2022-03-25 ASSESSMENT — PAIN SCALES - GENERAL
PAINLEVEL_OUTOF10: 0
PAINLEVEL_OUTOF10: 0

## 2022-03-25 NOTE — TELEPHONE ENCOUNTER
Eldon Castillo, DO Ena Tomas  If an echo was not done during this admission he needs an echo for his STEMI. Follow-up with me in 1 to 3 weeks.

## 2022-03-25 NOTE — PLAN OF CARE
Problem: Pain:  Goal: Recognizes and communicates pain  Description: Recognizes and communicates pain  3/25/2022 0757 by Mannie Young RN  Outcome: Met This Shift  3/25/2022 0109 by Shira Calabrese RN  Outcome: Met This Shift  3/24/2022 2003 by Rob Ortega RN  Outcome: Met This Shift  Goal: Pain level will decrease  Description: Pain level will decrease  3/25/2022 0757 by Mannie Young RN  Outcome: Met This Shift  3/25/2022 0109 by Shira Calabrese RN  Outcome: Met This Shift  3/24/2022 2003 by Rob Ortega RN  Outcome: Met This Shift     Problem: Tissue Perfusion - Cardiopulmonary, Altered:  Goal: Absence of angina  Description: Absence of angina  3/25/2022 0757 by Mannie Young RN  Outcome: Met This Shift  3/25/2022 0109 by Shira Calabrese RN  Outcome: Met This Shift  3/24/2022 2003 by Rob Ortega RN  Outcome: Met This Shift  Goal: Hemodynamic stability will improve  Description: Hemodynamic stability will improve  3/25/2022 0757 by Mannie Young RN  Outcome: Met This Shift  3/25/2022 0109 by Shira Calabrese RN  Outcome: Met This Shift  3/24/2022 2003 by Rob Ortega RN  Outcome: Met This Shift     Problem: Tissue Perfusion - Peripheral, Altered:  Goal: Absence of hematoma at arterial access site  Description: Absence of hematoma at arterial access site  3/25/2022 0757 by Mannie Young RN  Outcome: Met This Shift  3/25/2022 0109 by Shira Calabrese RN  Outcome: Met This Shift  3/24/2022 2003 by Rob Ortega RN  Outcome: Met This Shift  Goal: Circulatory function of lower extremities is within specified parameters  Description: Circulatory function of lower extremities is within specified parameters  3/25/2022 0757 by Mannie Young RN  Outcome: Met This Shift  3/25/2022 0109 by Shira Calabrese RN  Outcome: Met This Shift  3/24/2022 2003 by Rob Ortega RN  Outcome: Met This Shift     Problem: Tobacco Use:  Goal: Will participate in inpatient tobacco-use cessation counseling  Description: Will participate in inpatient tobacco-use cessation counseling  3/25/2022 0757 by Nader Palmer RN  Outcome: Met This Shift  3/25/2022 0109 by Mik Adhikari RN  Outcome: Met This Shift  3/24/2022 2003 by Kari Mortimer, RN  Outcome: Met This Shift     Problem: Falls - Risk of:  Goal: Will remain free from falls  Description: Will remain free from falls  3/25/2022 0757 by Nader Palmer RN  Outcome: Met This Shift  3/25/2022 0109 by Mik Adhikari RN  Outcome: Met This Shift  Goal: Absence of physical injury  Description: Absence of physical injury  3/25/2022 0757 by Nader Palmer RN  Outcome: Met This Shift  3/25/2022 0109 by Mik Adhikari RN  Outcome: Met This Shift

## 2022-03-25 NOTE — PROGRESS NOTES
Nutrition Education      Counseled patient on heart healthy/diabetic diet. Provided educational handouts and sample meal plans. Patient admits to eating out on the road often at fast food places. Recommend outpatient nutrition counseling for further education. · Written educational materials provided. · Contact name and number provided. · Refer to Patient Education activity for more details.     Electronically signed by Tan Ghosh RD, CRYSTAL on 3/25/22 at 12:47 PM EDT    Contact: [phone]

## 2022-03-25 NOTE — PROGRESS NOTES
Hospitalist Progress Note      SYNOPSIS: Patient admitted on 3/24/2022     Patient was transferred form [facility] ED after been diagnosed with STEMI. Came straight to cardiac cath lab. Seen after procedure. Patient was having chest pain for a week but short duration. Yesterday lasted longer and went to urgent care but resolved and he was sent back home. Today pain returned, severe and lasted 30 min so he called 911 and was taken to [facility] ED where EKG showed ST elevation in one lead, given ASA, heparin drip and NGT. On repeat EKG he has new changes beingn pain free at the time. Cardiology Dr Myron Cook discussed case with [facility] ED attending and patient was transferred for urgent cath  IM was called for admission        SUBJECTIVE:    Patient seen and examined   Doing well, no complaint, no CP or SOB   Records reviewed. Stable overnight. No other overnight issues reported. Temp (24hrs), Av.3 °F (36.3 °C), Min:96.4 °F (35.8 °C), Max:98 °F (36.7 °C)    DIET: ADULT DIET; Regular; 4 carb choices (60 gm/meal); Low Fat/Low Chol/High Fiber/2 gm Na  CODE: Prior  No intake or output data in the 24 hours ending 22 0914    OBJECTIVE:    /69   Pulse 99   Temp 97 °F (36.1 °C) (Oral)   Resp 18   Ht 6' 3\" (1.905 m)   Wt (!) 333 lb (151 kg)   SpO2 93%   BMI 41.62 kg/m²     General appearance: No apparent distress, appears stated age and cooperative. HEENT:  Conjunctivae/corneas clear. Neck: Supple. No jugular venous distention. Respiratory: Clear to auscultation bilaterally, normal respiratory effort  Cardiovascular: Regular rate rhythm, normal S1-S2  Abdomen: Soft, nontender, nondistended  Musculoskeletal: No clubbing, cyanosis, no bilateral lower extremity edema. Brisk capillary refill.    Skin:  No rashes  on visible skin  Neurologic: awake, alert and following commands     ASSESSMENT:    CAD on native artery  STEMI s/p cardiac cath   Paroxysmal A fibrillation of anticoagulation now  HTN  Obesity  Uncontrolled Diabetes Mellitus type 2: A1C 9.2. Home meds: jardiance  Asthma  PANCHO on QHS CPAP settings \"11\"  BPH  Hypokalemia   HLD: Fasting lipid panel: ,     PLAN:     DAPT: ASA and prasugrel  Atorvastatin 80 mg QHS  Tight glucose control  Follow up Cardiology recommendations  K replaced   Echo pending  Like DC in morning if remains stable     DISPOSITION: home when cleared by Cardio     Medications:  REVIEWED DAILY    Infusion Medications    dextrose       Scheduled Medications    doxazosin  8 mg Oral Daily    insulin lispro  0-12 Units SubCUTAneous TID WC    insulin lispro  0-6 Units SubCUTAneous Nightly    atorvastatin  80 mg Oral Nightly    aspirin  81 mg Oral Daily    clopidogrel  75 mg Oral Daily    metoprolol succinate  25 mg Oral Daily    bethanechol  25 mg Oral TID     PRN Meds: glucose, dextrose, glucagon (rDNA), dextrose, acetaminophen, oxyCODONE-acetaminophen, diphenhydrAMINE, sodium chloride, albuterol, perflutren lipid microspheres    Labs:     Recent Labs     03/24/22  1319   WBC 7.4   HGB 14.9   HCT 43.0          Recent Labs     03/24/22  1319 03/25/22  0541    138   K 3.1* 3.3*    103   CO2 22 22   BUN 13 13   CREATININE 0.8 0.9   CALCIUM 9.0 8.6       No results for input(s): PROT, ALB, ALKPHOS, ALT, AST, BILITOT, AMYLASE, LIPASE in the last 72 hours. No results for input(s): INR in the last 72 hours. No results for input(s): Malik Grates in the last 72 hours.     Chronic labs:    Lab Results   Component Value Date    CHOL 174 03/25/2022    TRIG 186 (H) 03/25/2022    HDL 35 03/25/2022    LDLCALC 102 (H) 03/25/2022    PSA 1.76 06/21/2021    INR 1.3 06/24/2020    LABA1C 9.2 (H) 03/25/2022       Radiology: REVIEWED DAILY    +++++++++++++++++++++++++++++++++++++++++++++++++  Meg Lamas MD  [facility] [place] Physician - [address], [place]  +++++++++++++++++++++++++++++++++++++++++++++++++  NOTE: This report was transcribed using voice recognition software. Every effort was made to ensure accuracy; however, inadvertent computerized transcription errors may be present.

## 2022-03-25 NOTE — CARE COORDINATION
3/25/22 Transition of Care: patient to er due to chest pain. To cath lab with findings of lesion and RAIN to mid RCA x2. Medication being adjusted. Cardiac rehab following for outpatient. He is alert and oriented. He resides with his wife and is independent. He follows with Dr Melissa Ryena and his pharmacy is [facility] in [place]. Plan is for patient to return home at discharge. Will follow.  Electronically signed by Carrington Ramsey RN CM on 3/25/2022 at 3:00 PM

## 2022-03-25 NOTE — CONSULTS
Met with patient and discussed that their physician has ordered a referral to our outpatient Phase II Cardiac Rehabilitation program. Reviewed the benefits of cardiac rehabilitation based on their diagnosis and personal risk factors. Patient demonstrates strong interest in Cardiac Rehabilitation at this time. Cardiac Rehabilitation brochure provided to patient/family. The Cardiac Rehabilitation Program has been provided the patient's referral information and pertinent patient details and history. The patient may call [facility] at [phone] for additional information or questions. Contact information for [facility] and other choices close to the patient's residence have been provided in the discharge instructions so that the patient may call and schedule an appointment when cleared by their physician.  Thank you for the referral.

## 2022-03-25 NOTE — PROGRESS NOTES
Date: 3/25/2022    Time: 1:09 AM    Patient Placed On BIPAP/CPAP/ Non-Invasive Ventilation? Yes    If no must comment. Facial area red/color change? No           If YES are Blister/Lesion present? No   If yes must notify nursing staff  BIPAP/CPAP skin barrier?   Yes    Skin barrier type:mepilexlite       Comments:        Corina Espinoza RCP

## 2022-03-25 NOTE — PROGRESS NOTES
CLINICAL PHARMACY NOTE: MEDS TO BEDS    Total # of Prescriptions Filled: 7   The following medications were delivered to the patient:  · Atorvastatin 80  · Doxazosin 4  · Metoprolol succinate er 50  · Lisinopril 10  · Aspiring 81  · prasugrel 10  · Nitroglycerin 0.4 sub     Additional Documentation:

## 2022-03-25 NOTE — CONSULTS
Thank you for your inpatient referral. Patient has already been educated by Cardiac Rehab. Thank you.

## 2022-03-25 NOTE — PROGRESS NOTES
Inpatient Cardiology Progress note     PATIENT IS BEING FOLLOWED FOR: Inferior STEMI s/p PCI     Lulú Buchanan is a 73year old  male who follows with Dr. Anju Urbina. SUBJECTIVE: Denies chest pain and dyspnea. Denies pain to right wrist cath site. States that he hasambulated without complaint. OBJECTIVE: No apparent distress     ROS:  Consist: Denies fevers, chills or night sweats  Heart: Denies chest pain, palpitations, lightheadedness, dizziness or syncope  Lungs: Denies SOB, cough, wheezing, orthopnea or PND  GI: Denies abdominal pain, vomiting or diarrhea    PHYSICAL EXAM:   /69   Pulse 99   Temp 97 °F (36.1 °C) (Oral)   Resp 18   Ht 6' 3\" (1.905 m)   Wt (!) 333 lb (151 kg)   SpO2 93%   BMI 41.62 kg/m²    CONST: Well developed, obese elderly  who appears stated age. Awake, alert and cooperative. No apparent distress  HEENT:   Head- Normocephalic, atraumatic   Eyes- Conjunctivae pink, anicteric  Throat- Oral mucosa pink and moist  Neck-  No stridor, trachea midline, no jugular venous distention. No carotid bruit  CHEST: Chest symmetrical and non-tender to palpation. No accessory muscle use or intercostal retractions  RESPIRATORY:  Lung sounds - clear throughout fields   CARDIOVASCULAR:     Heart Inspection- shows no noted pulsations  Heart Palpation- no heaves or thrills; PMI is non-displaced   Heart Ausculation- Regular rate and rhythm, no murmur. No s3, s4 or rub   PV: No lower extremity edema. No varicosities. Pedal pulses palpable, no clubbing or cyanosis. Right wrist cath site DSD removed with scant amount of dried bloody drainage. No ecchymosis noted. No hematoma or tenderness with palpation. Palpable right radial pulse  ABDOMEN: Soft, obese, non-tender to light palpation. Bowel sounds present. No palpable masses no organomegaly; no abdominal bruit  MS: Good muscle strength and tone. No atrophy or abnormal movements.    : Deferred  SKIN: Warm and dry no statis dermatitis or ulcers   NEURO / PSYCH: Oriented to person, place and time. Speech clear and appropriate. Follows all commands. Pleasant affect     Weight:   Wt Readings from Last 3 Encounters:   03/24/22 (!) 333 lb (151 kg)   03/24/22 (!) 333 lb (151 kg)   10/12/20 (!) 320 lb (145.2 kg)     Current Inpatient Medications:   doxazosin  8 mg Oral Daily    insulin lispro  0-12 Units SubCUTAneous TID WC    insulin lispro  0-6 Units SubCUTAneous Nightly    atorvastatin  80 mg Oral Nightly    aspirin  81 mg Oral Daily    clopidogrel  75 mg Oral Daily    metoprolol succinate  25 mg Oral Daily    bethanechol  25 mg Oral TID       IV Infusions (if any):   dextrose         DIAGNOSTIC/ LABORATORY DATA:  Labs:   CBC:   Recent Labs     03/24/22  1319   WBC 7.4   HGB 14.9   HCT 43.0        BMP:   Recent Labs     03/24/22  1319 03/25/22  0541    138   K 3.1* 3.3*   CO2 22 22   BUN 13 13   CREATININE 0.8 0.9   LABGLOM >60 >60   CALCIUM 9.0 8.6     Mag:   Recent Labs     03/24/22  1319   MG 1.9   HgA1c:   Lab Results   Component Value Date    LABA1C 9.2 (H) 03/25/2022   APTT:  Recent Labs     03/24/22  1329   APTT 27.2     CARDIAC ENZYMES:  Recent Labs     03/24/22  1319 03/24/22  1419   TROPHS 127* 144*     FASTING LIPID PANEL:  Lab Results   Component Value Date    CHOL 174 03/25/2022    HDL 35 03/25/2022    LDLCALC 102 03/25/2022    TRIG 186 03/25/2022 06/26/2020 TTE (Dr. Sandra Marcum):   Normal left ventricular chamber size. Normal left ventricular systolic function, LVEF is 70%. Moderate left ventricular concentric hypertrophy noted. Stage I diastolic dysfunction. Borderline right ventricular enlargement with normal function. Right atrium is enlarged. There is mild mitral regurgitation. No mitral valve prolapse. There is trace tricuspid regurgitation, RVSP 24mmHg. Normal aortic root size. No evidence of pericardial effusion. Epicardial fat.   Pericardium appears normal.  No intra cardiac mass or thrombus. No comparison study available. Lexiscan MPS 06/26/2020:  Normal Exam. EF 58%. No WMA. Cardiac Catheterization 03/24/2022 (Dr. Kobi Santos): Angiographic Results/findings:  Left Main: No angiographically significant stenosis. LAD: Mild diffuse luminal irregularities. D1: Mild diffuse luminal irregularities. D2: Mild diffuse luminal irregularities. .  Cx: Mild diffuse luminal irregularities. OM1: No angiographically significant stenosis. .  RCA: Huge, hyperdominant vessel. Mid long diffuse 95% stenosis with LEFTY II-III flow. .  PDA: No angiographically significant stenosis. Andres Skates PLB: Proximal to mid diffuse 40 to 50% stenosis. .  LV: Low normal global systolic function. No regional wall motion abnormalities. Ejection fraction 50%. Interventional results:  Mid RCA lesion  PrePCI LEFTY 2-3 flow. Successful predilatation of the mid RCA lesion with a 3.0 mm balloon. This lesion was then crossed and stented with a 4.0 x 28 mm drug-eluting stent to 14 atmospheres of pressure. This resulted in 0 percent residual stenosis with LEFTY-3 flow. The stent was a little short to cover the proximal portion of the lesion. Therefore a second 4.5 x 12 mm drug-eluting stent was placed in an overlapping fashion proximal to the first stent and covering an aneurysmal formation that was proximal to the lesion. This was deployed to 12 malathi pressure. The overlapping portion of the 2 stents was then postdilated with that second stent balloon to 14 malathi pressure twice. This resulted in 0% residual stenosis and LEFTY-3 flow. Telemetry: SR with first-degree AV block  12 lead EKG: N/A      ASSESSMENT:   1. Inferior STEMI s/p cardiac catheterization 03/24/2022 with RAIN placed to the mid RCA x 2. On DAPT. 2. HTN: Stable   3. HLD, on statin   4. Morbid Obesity  5. T2DM, HA1C 9.2 this admission   6. Asthma   7. PAF  8. Chronic anticoagulation with Eliquis discontinued in 2020  9. Chronic RBBB   10. BPH    11.  Hypokalemia, supplementation ordered      PLAN:  1. Importance of compliance with medications discussed with emphasis on DAPT. Patient verbalizes understanding and is agreeable. 2. Cardiac diet discussed. Patient is agreeable to comply. 3. Monitor BP/Renal Function. Consider resuming ACEI  4. Cardiac Rehab upon discharge  5. Follow-up appointment in the [facility] office with Dr. Mat Hudson on 05/13/2022 at 10:20 AM  6. Will discuss case with Dr. Justino Benítez    Electronically signed by ARLYN Coto CNP on 3/25/2022 at 9:24 AM   Addendum:  Case discussed with Dr. Justino Benítez. Will increase Toprol XL to 50mg daily. Will add Lisinopril 10mg daily. Will change Plavix to Effient. Will give Effient 60mg now and start 10mg daily as of tomorrow. Patient will need follow up with Dr. Hilary Hsieh in 4 weeks. Electronically signed by ARLYN Coto CNP on 3/25/22 at 2:51 PM EDT    PHYSICIAN ADDENDUM:  I independently reviewed the above documentation and made amendments as needed. I formulated the assessment and plan with the LISSET with my contribution being >50%.  Plan discussed with the patient/family/care team.      Louisa Morris MD, Neelam Lepe  Interventional Cardiology/Structural Heart Disease  Office: [phone]  Coordinator: Ruthie Guzman

## 2022-03-26 VITALS
HEART RATE: 81 BPM | WEIGHT: 315 LBS | OXYGEN SATURATION: 97 % | SYSTOLIC BLOOD PRESSURE: 133 MMHG | TEMPERATURE: 97.1 F | BODY MASS INDEX: 39.17 KG/M2 | DIASTOLIC BLOOD PRESSURE: 86 MMHG | HEIGHT: 75 IN | RESPIRATION RATE: 16 BRPM

## 2022-03-26 LAB
ANION GAP SERPL CALCULATED.3IONS-SCNC: 14 MMOL/L (ref 7–16)
BUN BLDV-MCNC: 14 MG/DL (ref 6–23)
CALCIUM SERPL-MCNC: 8.2 MG/DL (ref 8.6–10.2)
CHLORIDE BLD-SCNC: 103 MMOL/L (ref 98–107)
CO2: 22 MMOL/L (ref 22–29)
CREAT SERPL-MCNC: 0.7 MG/DL (ref 0.7–1.2)
GFR AFRICAN AMERICAN: >60
GFR NON-AFRICAN AMERICAN: >60 ML/MIN/1.73
GLUCOSE BLD-MCNC: 151 MG/DL (ref 74–99)
METER GLUCOSE: 188 MG/DL (ref 74–99)
POTASSIUM SERPL-SCNC: 3.3 MMOL/L (ref 3.5–5)
POTASSIUM SERPL-SCNC: 3.6 MMOL/L (ref 3.5–5)
SODIUM BLD-SCNC: 139 MMOL/L (ref 132–146)

## 2022-03-26 PROCEDURE — 94660 CPAP INITIATION&MGMT: CPT

## 2022-03-26 PROCEDURE — 82962 GLUCOSE BLOOD TEST: CPT

## 2022-03-26 PROCEDURE — 99233 SBSQ HOSP IP/OBS HIGH 50: CPT | Performed by: INTERNAL MEDICINE

## 2022-03-26 PROCEDURE — 6370000000 HC RX 637 (ALT 250 FOR IP): Performed by: INTERNAL MEDICINE

## 2022-03-26 PROCEDURE — 80048 BASIC METABOLIC PNL TOTAL CA: CPT

## 2022-03-26 PROCEDURE — 6370000000 HC RX 637 (ALT 250 FOR IP): Performed by: NURSE PRACTITIONER

## 2022-03-26 PROCEDURE — 84132 ASSAY OF SERUM POTASSIUM: CPT

## 2022-03-26 PROCEDURE — 36415 COLL VENOUS BLD VENIPUNCTURE: CPT

## 2022-03-26 PROCEDURE — 93005 ELECTROCARDIOGRAM TRACING: CPT | Performed by: INTERNAL MEDICINE

## 2022-03-26 RX ORDER — METOPROLOL SUCCINATE 25 MG/1
75 TABLET, EXTENDED RELEASE ORAL DAILY
Qty: 30 TABLET | Refills: 3 | Status: SHIPPED | OUTPATIENT
Start: 2022-03-27

## 2022-03-26 RX ORDER — LISINOPRIL 20 MG/1
20 TABLET ORAL DAILY
Qty: 30 TABLET | Refills: 3 | Status: SHIPPED | OUTPATIENT
Start: 2022-03-27

## 2022-03-26 RX ORDER — POTASSIUM CHLORIDE 20 MEQ/1
40 TABLET, EXTENDED RELEASE ORAL ONCE
Status: COMPLETED | OUTPATIENT
Start: 2022-03-26 | End: 2022-03-26

## 2022-03-26 RX ORDER — ISOSORBIDE MONONITRATE 30 MG/1
30 TABLET, EXTENDED RELEASE ORAL DAILY
Qty: 30 TABLET | Refills: 3 | Status: SHIPPED | OUTPATIENT
Start: 2022-03-27 | End: 2022-04-20

## 2022-03-26 RX ORDER — POTASSIUM CHLORIDE 20 MEQ/1
40 TABLET, EXTENDED RELEASE ORAL
Status: DISCONTINUED | OUTPATIENT
Start: 2022-03-27 | End: 2022-03-26 | Stop reason: HOSPADM

## 2022-03-26 RX ORDER — ISOSORBIDE MONONITRATE 30 MG/1
30 TABLET, EXTENDED RELEASE ORAL DAILY
Status: DISCONTINUED | OUTPATIENT
Start: 2022-03-26 | End: 2022-03-26 | Stop reason: HOSPADM

## 2022-03-26 RX ORDER — POTASSIUM CHLORIDE 750 MG/1
10 TABLET, EXTENDED RELEASE ORAL DAILY
Qty: 7 TABLET | Refills: 0 | Status: SHIPPED | OUTPATIENT
Start: 2022-03-26 | End: 2022-04-20

## 2022-03-26 RX ORDER — LISINOPRIL 20 MG/1
20 TABLET ORAL DAILY
Status: DISCONTINUED | OUTPATIENT
Start: 2022-03-27 | End: 2022-03-26 | Stop reason: HOSPADM

## 2022-03-26 RX ADMIN — METOPROLOL SUCCINATE 50 MG: 50 TABLET, EXTENDED RELEASE ORAL at 08:47

## 2022-03-26 RX ADMIN — ASPIRIN 81 MG: 81 TABLET, COATED ORAL at 08:47

## 2022-03-26 RX ADMIN — ACETAMINOPHEN 650 MG: 325 TABLET ORAL at 14:54

## 2022-03-26 RX ADMIN — INSULIN LISPRO 2 UNITS: 100 INJECTION, SOLUTION INTRAVENOUS; SUBCUTANEOUS at 08:48

## 2022-03-26 RX ADMIN — PRASUGREL 10 MG: 10 TABLET, FILM COATED ORAL at 08:47

## 2022-03-26 RX ADMIN — INSULIN LISPRO 2 UNITS: 100 INJECTION, SOLUTION INTRAVENOUS; SUBCUTANEOUS at 13:11

## 2022-03-26 RX ADMIN — ISOSORBIDE MONONITRATE 30 MG: 30 TABLET, EXTENDED RELEASE ORAL at 14:54

## 2022-03-26 RX ADMIN — POTASSIUM CHLORIDE 40 MEQ: 20 TABLET, EXTENDED RELEASE ORAL at 08:47

## 2022-03-26 RX ADMIN — LISINOPRIL 10 MG: 10 TABLET ORAL at 08:47

## 2022-03-26 ASSESSMENT — PAIN SCALES - GENERAL
PAINLEVEL_OUTOF10: 1
PAINLEVEL_OUTOF10: 0

## 2022-03-26 ASSESSMENT — PAIN DESCRIPTION - LOCATION: LOCATION: CHEST;BACK

## 2022-03-26 ASSESSMENT — PAIN DESCRIPTION - PAIN TYPE: TYPE: ACUTE PAIN

## 2022-03-26 ASSESSMENT — PAIN DESCRIPTION - DESCRIPTORS: DESCRIPTORS: ACHING;DULL;DISCOMFORT

## 2022-03-26 NOTE — PROGRESS NOTES
PROGRESS NOTE     CARDIOLOGY    Chief complaint: Seen today for follow up, management & recommendations for coronary artery disease    He denies chest pain or shortness of breath today. He was sitting in a chair at side of bed. Is comfortable and in no distress. Wt Readings from Last 3 Encounters:   03/24/22 (!) 333 lb (151 kg)   03/24/22 (!) 333 lb (151 kg)   10/12/20 (!) 320 lb (145.2 kg)     Temp Readings from Last 3 Encounters:   03/26/22 96.3 °F (35.7 °C) (Temporal)   03/24/22 98 °F (36.7 °C) (Oral)   10/13/20 98 °F (36.7 °C) (Temporal)     BP Readings from Last 3 Encounters:   03/26/22 125/73   03/24/22 (!) 157/87   10/13/20 127/72     Pulse Readings from Last 3 Encounters:   03/26/22 77   03/24/22 79   10/13/20 64       No intake or output data in the 24 hours ending 03/26/22 1553    Recent Labs     03/24/22  1319   WBC 7.4   HGB 14.9   HCT 43.0   MCV 86.2        Recent Labs     03/24/22  1319 03/25/22  0541 03/25/22  0541 03/25/22  1412 03/26/22  0515 03/26/22  1436    138  --   --  139  --    K 3.1* 3.3*   < > 3.8 3.3* 3.6    103  --   --  103  --    CO2 22 22  --   --  22  --    BUN 13 13  --   --  14  --    CREATININE 0.8 0.9  --   --  0.7  --    MG 1.9 2.1  --   --   --   --     < > = values in this interval not displayed. No results for input(s): PROTIME, INR in the last 72 hours. No results for input(s): CKTOTAL, CKMB, CKMBINDEX, TROPONINI in the last 72 hours. No results for input(s): BNP in the last 72 hours.   Recent Labs     03/25/22  0541   CHOL 174   HDL 35   TRIG 186*     Recent Labs     03/24/22  1319 03/24/22  1419   TROPHS 127* 144*         [START ON 3/27/2022] lisinopril (PRINIVIL;ZESTRIL) tablet 20 mg, Daily  [START ON 3/27/2022] metoprolol succinate (TOPROL XL) extended release tablet 75 mg, Daily  [START ON 3/27/2022] potassium chloride (KLOR-CON M) extended release tablet 40 mEq, Daily with breakfast  isosorbide mononitrate (IMDUR) extended release tablet 30 mg, Daily  doxazosin (CARDURA) tablet 8 mg, Daily  nitroGLYCERIN (NITROSTAT) SL tablet 0.4 mg, Q5 Min PRN  prasugrel (EFFIENT) tablet 10 mg, Daily  glucose (GLUTOSE) 40 % oral gel 15 g, PRN  dextrose 50 % IV solution, PRN  glucagon (rDNA) injection 1 mg, PRN  dextrose 5 % solution, PRN  insulin lispro (HUMALOG) injection vial 0-12 Units, TID WC  insulin lispro (HUMALOG) injection vial 0-6 Units, Nightly  atorvastatin (LIPITOR) tablet 80 mg, Nightly  aspirin EC tablet 81 mg, Daily  acetaminophen (TYLENOL) tablet 650 mg, Q4H PRN  oxyCODONE-acetaminophen (PERCOCET) 5-325 MG per tablet 1 tablet, Q4H PRN  diphenhydrAMINE (BENADRYL) tablet 50 mg, Q6H PRN  bethanechol (URECHOLINE) tablet 25 mg, TID  sodium chloride (OCEAN, BABY AYR) 0.65 % nasal spray 1 spray, PRN  albuterol (PROVENTIL) nebulizer solution 2.5 mg, Q4H PRN  perflutren lipid microspheres (DEFINITY) injection 1.65 mg, ONCE PRN        Review of systems:     Heart: as above   Lungs: as above   Eyes: denies changes in vision or discharge. Ears: denies changes in hearing or pain. Nose: denies epistaxis or masses   Throat: denies sore throat or trouble swallowing. Neuro: denies numbness, tingling, tremors. Skin: denies rashes or itching. : denies hematuria, dysuria   GI: denies vomiting, diarrhea   Psych: denies mood changed, anxiety, depression. Physical exam:    Constitutional: A&O x3, communicates well, no acute distress. Eyes: extraocular muscles intact, PERRL. Normal lids & conjunctiva. No icterus. ENT: clear, no bleeding. No external masses. Lips normal formation. Neck: supple, full ROM, no JVD, no bruits, no lymphadenopathy. No masses. trachea midline. Heart: regular rate & rhythm, normal S1 & S2, no abnormal murmurs. No heave. Lungs: CTA. No accessory muscles. Mildly decreased air movement  Abd: soft, non-tender. Normal bowel sounds. Super morbidly obese. Neuro: Full ROM X 4, EOMI, no tremors.   EXT: No bilateral lower extremity edema  Skin: warm, dry, intact. Good turgor. Psych: A&O x 3, normal behavior, not anxious. Assessment/Recommendations  1. Coronary artery disease. Successful sequential and overlapping 4.5 x 12 and 4.0 x 28 mm drug-eluting stents to the proximal and mid RCA. Good results. 2. STEMI. No symptoms today. 3. Morbid obesity. 4. Asthma  5. Diabetes  6. Hypertension. Not well controlled. I increased his lisinopril and Toprol today. 7. BPH    Note: This report was completed using computerized voice recognition software. Every effort has been made to ensure accuracy, however; and invert and computerized transcription errors may be present. Addendum:  I was called later after I saw him for atypical chest pain. He was given a sublingual nitroglycerin which he states relieved his pain. I have ordered Imdur. However, then it recurred so was called again. Upon my return his ECG demonstrated significant improvement with no acute ischemic changes. Upon my return he was now pain-free again. He states that the pain was in his right anterior shoulder and then radiated across to his left anterior shoulder. He states that it was similar to his presentation pain but less intense and now has resolved. He did have some tenderness but he states this did not reproduce his symptoms. I discussed this with him and recommended staying another day for further observation. He wishes not to. He does agree to stay for a few more hours and ambulate in the halls. The nurse will call me after few hours and his ambulation and if he is not having any symptoms he wishes to be discharged. I would be okay in that scenario. He understands to return if his symptoms recur.

## 2022-03-26 NOTE — PROGRESS NOTES
Episode of chest discomfort @ described as a 1/10, sl nitro given Bp 137/ 79 Dr. Cyril Ventura notified.  Pain releif after 1 nitro

## 2022-03-26 NOTE — DISCHARGE SUMMARY
Hospitalist Discharge Summary    Patient ID: Natalee Mcfadden   Patient : 1950  Patient's PCP: Dewayne Mendoza MD    Admit Date: 3/24/2022   Admitting Physician: Rivka Hanna MD    Discharge Date:  3/26/2022   Discharge Physician: Yasmine Coreas MD   Discharge Condition: Stable  Discharge Disposition: [facility] course in brief:  (Please refer to daily progress notes for a comprehensive review of the hospitalization by requesting medical records)    Mr. Natalee Mcfadden, a 73y.o. year old male  who  has a past medical history of Arthritis, Asthma, Atrial fibrillation ([address] Diabetes mellitus ([address] H/O cardiovascular stress test, Hypertension, and Sleep apnea.      Patient was transferred form [facility] ED after been diagnosed with STEMI. Came straight to cardiac cath lab. Seen after procedure. Patient was having chest pain for a week but short duration. Yesterday lasted longer and went to urgent care but resolved and he was sent back home. Today pain returned, severe and lasted 30 min so he called 911 and was taken to [facility] ED where EKG showed ST elevation in one lead, given ASA, heparin drip and NGT. On repeat EKG he has new changes beingn pain free at the time. Cardiology Dr Chava Manley discussed case with [facility] ED attending and patient was transferred for urgent cath  IM was called for admission. Patient underwent cardiac cath and stent placement for STEMI. Placed on dual antiplatelet therapy aspirin and prasugrel. Atorvastatin 800 mg nightly. Patient has history of PANCHO and is compliant with CPAP at night. You have uncontrolled diabetes mellitus A1c of 9.2. Patient will have appointment next week for adjustment of diabetes medication by his primary care provider. Echocardiogram demonstrated preserved ejection fraction. Hypertension was not at goal medications were treated accordingly. Patient developed chest pain around noon left with nitroglycerin. After that episode patient was walked on the hallway under the nurse's supervision and developed no further chest pain cardiology cleared patient for discharge. He was discharged home on stable condition. Consults:   IP CONSULT TO CARDIAC REHAB  IP CONSULT TO DIETITIAN  IP CONSULT TO RESPIRATORY CARE  IP CONSULT TO CARDIAC REHAB    Discharge Diagnoses:    CAD of native artery  STEMI status post cardiac cath and stent on dual antiplatelet therapy  Paroxysmal A fibrillation of anticoagulation now  HTN  Obesity  Uncontrolled Diabetes Mellitus type 2: A1C 9.2. Home meds: jardiance  Asthma  PANCHO on QHS CPAP  BPH  Hypokalemia. . Normal on discharge. Potassium chloride replacement given on discharge and instruction to follow-up with primary care provider  HLD: Fasting lipid panel: ,     Discharge Medications:      Medication List      START taking these medications    aspirin 81 MG EC tablet  Take 1 tablet by mouth daily     atorvastatin 80 MG tablet  Commonly known as: LIPITOR  Take 1 tablet by mouth nightly     doxazosin 8 MG tablet  Commonly known as: CARDURA  Take 1 tablet by mouth daily     isosorbide mononitrate 30 MG extended release tablet  Commonly known as: IMDUR  Take 1 tablet by mouth daily  Start taking on: March 27, 2022     lisinopril 20 MG tablet  Commonly known as: PRINIVIL;ZESTRIL  Take 1 tablet by mouth daily  Start taking on: March 27, 2022     metoprolol succinate 25 MG extended release tablet  Commonly known as: TOPROL XL  Take 3 tablets by mouth daily  Start taking on: March 27, 2022     nitroGLYCERIN 0.4 MG SL tablet  Commonly known as: NITROSTAT  up to max of 3 total doses. If no relief after 1 dose, call 911.      potassium chloride 10 MEQ extended release tablet  Commonly known as: KLOR-CON M  Take 1 tablet by mouth daily for 7 days     prasugrel 10 MG Tabs  Commonly known as: EFFIENT  Take 1 tablet by mouth daily        CONTINUE taking these medications    Benadryl 25 MG tablet  Generic drug: diphenhydrAMINE     CENTRUM/CERTA-ANABEL with minerals oral solution     Jardiance 10 MG tablet  Generic drug: empagliflozin     oxymetazoline 0.05 % nasal spray  Commonly known as: AFRIN     sodium chloride 0.65 % nasal spray  Commonly known as: RANDEE, BABY AYR     Ventolin  (90 Base) MCG/ACT inhaler  Generic drug: albuterol sulfate HFA        STOP taking these medications    lisinopril-hydroCHLOROthiazide 20-12.5 MG per tablet  Commonly known as: PRINZIDE;ZESTORETIC     terazosin 10 MG capsule  Commonly known as: HYTRIN           Where to Get Your Medications      These medications were sent to [facility] [address] [place] [address]    Phone: [phone]   · aspirin 81 MG EC tablet  · atorvastatin 80 MG tablet  · doxazosin 8 MG tablet  · nitroGLYCERIN 0.4 MG SL tablet  · prasugrel 10 MG Tabs     These medications were sent to [address] [place] [address], [place] - [address] [facility] [phone] Sharlett Duel [phone]  [address] [facility] [address]    Phone: [phone]   · isosorbide mononitrate 30 MG extended release tablet  · lisinopril 20 MG tablet  · metoprolol succinate 25 MG extended release tablet  · potassium chloride 10 MEQ extended release tablet         Time Spent on discharge is more than 45 minutes in the examination, evaluation, counseling and review of medications and discharge plan.    +++++++++++++++++++++++++++++++++++++++++++++++++  Bishop Jarret MD  [facility] Physician - [address] [place] [address]  +++++++++++++++++++++++++++++++++++++++++++++++++  NOTE: This report was transcribed using voice recognition software. Every effort was made to ensure accuracy; however, inadvertent computerized transcription errors may be present.

## 2022-03-28 LAB
EKG ATRIAL RATE: 76 BPM
EKG ATRIAL RATE: 78 BPM
EKG P AXIS: 31 DEGREES
EKG P AXIS: 36 DEGREES
EKG P-R INTERVAL: 196 MS
EKG P-R INTERVAL: 216 MS
EKG Q-T INTERVAL: 396 MS
EKG Q-T INTERVAL: 424 MS
EKG QRS DURATION: 126 MS
EKG QRS DURATION: 144 MS
EKG QTC CALCULATION (BAZETT): 448 MS
EKG QTC CALCULATION (BAZETT): 477 MS
EKG R AXIS: -77 DEGREES
EKG R AXIS: -78 DEGREES
EKG T AXIS: 10 DEGREES
EKG T AXIS: 12 DEGREES
EKG VENTRICULAR RATE: 76 BPM
EKG VENTRICULAR RATE: 77 BPM

## 2022-03-28 PROCEDURE — 93010 ELECTROCARDIOGRAM REPORT: CPT | Performed by: INTERNAL MEDICINE

## 2022-04-05 ENCOUNTER — TELEPHONE (OUTPATIENT)
Dept: CARDIOLOGY CLINIC | Age: 72
End: 2022-04-05

## 2022-04-05 NOTE — TELEPHONE ENCOUNTER
This is a dr Armin Valentin patient and better he stay with him  [facility]

## 2022-04-05 NOTE — TELEPHONE ENCOUNTER
You seen Hector Espinoza in office in 2020 - he only seen Dr Caro Sewell in the hospital once and is wanting to remain with you. Office visit scheduled in May. He had a recent stent placement and is wanting to attend cardiac rehab. OK to place order?

## 2022-04-20 ENCOUNTER — HOSPITAL ENCOUNTER (OUTPATIENT)
Dept: CARDIAC REHAB | Age: 72
Setting detail: THERAPIES SERIES
Discharge: HOME | End: 2022-04-20
Payer: MEDICARE

## 2022-04-20 VITALS
RESPIRATION RATE: 22 BRPM | HEIGHT: 74 IN | HEART RATE: 72 BPM | DIASTOLIC BLOOD PRESSURE: 72 MMHG | BODY MASS INDEX: 40.43 KG/M2 | SYSTOLIC BLOOD PRESSURE: 136 MMHG | OXYGEN SATURATION: 97 % | WEIGHT: 315 LBS

## 2022-04-20 PROCEDURE — 93798 PHYS/QHP OP CAR RHAB W/ECG: CPT

## 2022-04-20 PROCEDURE — 93797 PHYS/QHP OP CAR RHAB WO ECG: CPT

## 2022-04-20 RX ORDER — MULTIVIT WITH MINERALS/LUTEIN
250 TABLET ORAL DAILY
COMMUNITY

## 2022-04-20 RX ORDER — TERAZOSIN 5 MG/1
10 CAPSULE ORAL NIGHTLY
COMMUNITY

## 2022-04-20 ASSESSMENT — PATIENT HEALTH QUESTIONNAIRE - PHQ9
SUM OF ALL RESPONSES TO PHQ9 QUESTIONS 1 & 2: 0
4. FEELING TIRED OR HAVING LITTLE ENERGY: 1
5. POOR APPETITE OR OVEREATING: 0
8. MOVING OR SPEAKING SO SLOWLY THAT OTHER PEOPLE COULD HAVE NOTICED. OR THE OPPOSITE, BEING SO FIGETY OR RESTLESS THAT YOU HAVE BEEN MOVING AROUND A LOT MORE THAN USUAL: 0
9. THOUGHTS THAT YOU WOULD BE BETTER OFF DEAD, OR OF HURTING YOURSELF: 0
SUM OF ALL RESPONSES TO PHQ QUESTIONS 1-9: 1
3. TROUBLE FALLING OR STAYING ASLEEP: 0
10. IF YOU CHECKED OFF ANY PROBLEMS, HOW DIFFICULT HAVE THESE PROBLEMS MADE IT FOR YOU TO DO YOUR WORK, TAKE CARE OF THINGS AT HOME, OR GET ALONG WITH OTHER PEOPLE: 0
1. LITTLE INTEREST OR PLEASURE IN DOING THINGS: 0
6. FEELING BAD ABOUT YOURSELF - OR THAT YOU ARE A FAILURE OR HAVE LET YOURSELF OR YOUR FAMILY DOWN: 0
2. FEELING DOWN, DEPRESSED OR HOPELESS: 0
SUM OF ALL RESPONSES TO PHQ QUESTIONS 1-9: 1
7. TROUBLE CONCENTRATING ON THINGS, SUCH AS READING THE NEWSPAPER OR WATCHING TELEVISION: 0

## 2022-04-20 ASSESSMENT — EJECTION FRACTION
EF_VALUE: 55
EF_VALUE: 55

## 2022-04-25 ENCOUNTER — HOSPITAL ENCOUNTER (OUTPATIENT)
Dept: CARDIAC REHAB | Age: 72
Setting detail: THERAPIES SERIES
Discharge: HOME | End: 2022-04-25
Payer: MEDICARE

## 2022-04-25 PROCEDURE — 93798 PHYS/QHP OP CAR RHAB W/ECG: CPT

## 2022-04-27 ENCOUNTER — HOSPITAL ENCOUNTER (OUTPATIENT)
Dept: CARDIAC REHAB | Age: 72
Setting detail: THERAPIES SERIES
Discharge: HOME | End: 2022-04-27
Payer: MEDICARE

## 2022-04-27 PROCEDURE — 93798 PHYS/QHP OP CAR RHAB W/ECG: CPT

## 2022-04-28 ASSESSMENT — NEW YORK HEART ASSOCIATION (NYHA) CLASSIFICATION: NYHA FUNCTIONAL CLASS: NO NYHA CLASS OR UNABLE TO DETERMINE

## 2022-04-28 ASSESSMENT — EXERCISE STRESS TEST
PEAK_HR: 102
PEAK_RPD: 1
PEAK_BP: 156/74
PEAK_BP: 156/74

## 2022-04-29 ENCOUNTER — HOSPITAL ENCOUNTER (OUTPATIENT)
Dept: CARDIAC REHAB | Age: 72
Setting detail: THERAPIES SERIES
Discharge: HOME | End: 2022-04-29
Payer: MEDICARE

## 2022-04-29 PROCEDURE — 93798 PHYS/QHP OP CAR RHAB W/ECG: CPT

## 2022-05-02 ENCOUNTER — HOSPITAL ENCOUNTER (OUTPATIENT)
Dept: CARDIAC REHAB | Age: 72
Setting detail: THERAPIES SERIES
Discharge: HOME | End: 2022-05-02
Payer: MEDICARE

## 2022-05-02 PROCEDURE — 93798 PHYS/QHP OP CAR RHAB W/ECG: CPT

## 2022-05-04 ENCOUNTER — HOSPITAL ENCOUNTER (OUTPATIENT)
Dept: CARDIAC REHAB | Age: 72
Setting detail: THERAPIES SERIES
Discharge: HOME | End: 2022-05-04
Payer: MEDICARE

## 2022-05-04 PROCEDURE — 93798 PHYS/QHP OP CAR RHAB W/ECG: CPT

## 2022-05-05 ENCOUNTER — TELEPHONE (OUTPATIENT)
Dept: CARDIAC REHAB | Age: 72
End: 2022-05-05

## 2022-05-05 NOTE — TELEPHONE ENCOUNTER
5/4/2022 11:02 am Nutrition: Reviewed rate your plate form. Score: 59. Score indicates patient needs to make some healthy food choices. Plan to discuss at next available time. Will remain available.  Electronically signed by Sri Bhat RD, LD on 5/5/22 at 11:03 AM EDT

## 2022-05-06 ENCOUNTER — HOSPITAL ENCOUNTER (OUTPATIENT)
Dept: CARDIAC REHAB | Age: 72
Setting detail: THERAPIES SERIES
Discharge: HOME | End: 2022-05-06
Payer: MEDICARE

## 2022-05-06 PROCEDURE — 93798 PHYS/QHP OP CAR RHAB W/ECG: CPT

## 2022-05-09 ENCOUNTER — HOSPITAL ENCOUNTER (OUTPATIENT)
Dept: CARDIAC REHAB | Age: 72
Setting detail: THERAPIES SERIES
Discharge: HOME | End: 2022-05-09
Payer: MEDICARE

## 2022-05-09 PROCEDURE — 93798 PHYS/QHP OP CAR RHAB W/ECG: CPT

## 2022-05-11 ENCOUNTER — HOSPITAL ENCOUNTER (OUTPATIENT)
Dept: CARDIAC REHAB | Age: 72
Setting detail: THERAPIES SERIES
Discharge: HOME | End: 2022-05-11
Payer: MEDICARE

## 2022-05-11 PROCEDURE — 93798 PHYS/QHP OP CAR RHAB W/ECG: CPT

## 2022-05-13 ENCOUNTER — HOSPITAL ENCOUNTER (OUTPATIENT)
Dept: CARDIAC REHAB | Age: 72
Setting detail: THERAPIES SERIES
End: 2022-05-13
Payer: MEDICARE

## 2022-05-16 ENCOUNTER — HOSPITAL ENCOUNTER (OUTPATIENT)
Dept: CARDIAC REHAB | Age: 72
Setting detail: THERAPIES SERIES
Discharge: HOME | End: 2022-05-16
Payer: MEDICARE

## 2022-05-16 VITALS — BODY MASS INDEX: 40.87 KG/M2 | WEIGHT: 315 LBS

## 2022-05-16 PROCEDURE — 93798 PHYS/QHP OP CAR RHAB W/ECG: CPT

## 2022-05-16 ASSESSMENT — PATIENT HEALTH QUESTIONNAIRE - PHQ9
8. MOVING OR SPEAKING SO SLOWLY THAT OTHER PEOPLE COULD HAVE NOTICED. OR THE OPPOSITE, BEING SO FIGETY OR RESTLESS THAT YOU HAVE BEEN MOVING AROUND A LOT MORE THAN USUAL: 0
7. TROUBLE CONCENTRATING ON THINGS, SUCH AS READING THE NEWSPAPER OR WATCHING TELEVISION: 0
10. IF YOU CHECKED OFF ANY PROBLEMS, HOW DIFFICULT HAVE THESE PROBLEMS MADE IT FOR YOU TO DO YOUR WORK, TAKE CARE OF THINGS AT HOME, OR GET ALONG WITH OTHER PEOPLE: 0
SUM OF ALL RESPONSES TO PHQ QUESTIONS 1-9: 1
9. THOUGHTS THAT YOU WOULD BE BETTER OFF DEAD, OR OF HURTING YOURSELF: 0
SUM OF ALL RESPONSES TO PHQ QUESTIONS 1-9: 1
SUM OF ALL RESPONSES TO PHQ QUESTIONS 1-9: 1
4. FEELING TIRED OR HAVING LITTLE ENERGY: 0
6. FEELING BAD ABOUT YOURSELF - OR THAT YOU ARE A FAILURE OR HAVE LET YOURSELF OR YOUR FAMILY DOWN: 1
3. TROUBLE FALLING OR STAYING ASLEEP: 0
5. POOR APPETITE OR OVEREATING: 0
SUM OF ALL RESPONSES TO PHQ QUESTIONS 1-9: 1

## 2022-05-18 ENCOUNTER — HOSPITAL ENCOUNTER (OUTPATIENT)
Dept: CARDIAC REHAB | Age: 72
Setting detail: THERAPIES SERIES
Discharge: HOME | End: 2022-05-18
Payer: MEDICARE

## 2022-05-18 PROCEDURE — 93798 PHYS/QHP OP CAR RHAB W/ECG: CPT

## 2022-05-20 ENCOUNTER — HOSPITAL ENCOUNTER (OUTPATIENT)
Dept: CARDIAC REHAB | Age: 72
Setting detail: THERAPIES SERIES
Discharge: HOME | End: 2022-05-20
Payer: MEDICARE

## 2022-05-20 PROCEDURE — 93798 PHYS/QHP OP CAR RHAB W/ECG: CPT

## 2022-05-23 ENCOUNTER — HOSPITAL ENCOUNTER (OUTPATIENT)
Dept: CARDIAC REHAB | Age: 72
Setting detail: THERAPIES SERIES
Discharge: HOME | End: 2022-05-23
Payer: MEDICARE

## 2022-05-23 PROCEDURE — 93798 PHYS/QHP OP CAR RHAB W/ECG: CPT

## 2022-05-25 ENCOUNTER — HOSPITAL ENCOUNTER (OUTPATIENT)
Dept: CARDIAC REHAB | Age: 72
Setting detail: THERAPIES SERIES
Discharge: HOME | End: 2022-05-25
Payer: MEDICARE

## 2022-05-25 PROCEDURE — 93798 PHYS/QHP OP CAR RHAB W/ECG: CPT

## 2022-05-27 ENCOUNTER — HOSPITAL ENCOUNTER (OUTPATIENT)
Dept: CARDIAC REHAB | Age: 72
Setting detail: THERAPIES SERIES
Discharge: HOME | End: 2022-05-27
Payer: MEDICARE

## 2022-05-27 PROCEDURE — 93798 PHYS/QHP OP CAR RHAB W/ECG: CPT

## 2022-06-01 ENCOUNTER — HOSPITAL ENCOUNTER (OUTPATIENT)
Dept: CARDIAC REHAB | Age: 72
Setting detail: THERAPIES SERIES
Discharge: HOME | End: 2022-06-01
Payer: MEDICARE

## 2022-06-01 PROCEDURE — 93798 PHYS/QHP OP CAR RHAB W/ECG: CPT

## 2022-06-03 ENCOUNTER — HOSPITAL ENCOUNTER (OUTPATIENT)
Dept: CARDIAC REHAB | Age: 72
Setting detail: THERAPIES SERIES
Discharge: HOME | End: 2022-06-03
Payer: MEDICARE

## 2022-06-03 PROCEDURE — 93798 PHYS/QHP OP CAR RHAB W/ECG: CPT

## 2022-06-08 ENCOUNTER — HOSPITAL ENCOUNTER (OUTPATIENT)
Dept: CARDIAC REHAB | Age: 72
Setting detail: THERAPIES SERIES
Discharge: HOME | End: 2022-06-08
Payer: MEDICARE

## 2022-06-08 PROCEDURE — 93798 PHYS/QHP OP CAR RHAB W/ECG: CPT

## 2022-06-13 ENCOUNTER — HOSPITAL ENCOUNTER (OUTPATIENT)
Dept: CARDIAC REHAB | Age: 72
Setting detail: THERAPIES SERIES
Discharge: HOME | End: 2022-06-13
Payer: MEDICARE

## 2022-06-13 PROCEDURE — 93798 PHYS/QHP OP CAR RHAB W/ECG: CPT

## 2022-06-13 ASSESSMENT — PATIENT HEALTH QUESTIONNAIRE - PHQ9
SUM OF ALL RESPONSES TO PHQ QUESTIONS 1-9: 1
7. TROUBLE CONCENTRATING ON THINGS, SUCH AS READING THE NEWSPAPER OR WATCHING TELEVISION: 0
SUM OF ALL RESPONSES TO PHQ QUESTIONS 1-9: 1
5. POOR APPETITE OR OVEREATING: 0
SUM OF ALL RESPONSES TO PHQ QUESTIONS 1-9: 1
3. TROUBLE FALLING OR STAYING ASLEEP: 0
8. MOVING OR SPEAKING SO SLOWLY THAT OTHER PEOPLE COULD HAVE NOTICED. OR THE OPPOSITE, BEING SO FIGETY OR RESTLESS THAT YOU HAVE BEEN MOVING AROUND A LOT MORE THAN USUAL: 0
10. IF YOU CHECKED OFF ANY PROBLEMS, HOW DIFFICULT HAVE THESE PROBLEMS MADE IT FOR YOU TO DO YOUR WORK, TAKE CARE OF THINGS AT HOME, OR GET ALONG WITH OTHER PEOPLE: 0
6. FEELING BAD ABOUT YOURSELF - OR THAT YOU ARE A FAILURE OR HAVE LET YOURSELF OR YOUR FAMILY DOWN: 0
SUM OF ALL RESPONSES TO PHQ QUESTIONS 1-9: 1
4. FEELING TIRED OR HAVING LITTLE ENERGY: 1
1. LITTLE INTEREST OR PLEASURE IN DOING THINGS: 0
SUM OF ALL RESPONSES TO PHQ9 QUESTIONS 1 & 2: 0
9. THOUGHTS THAT YOU WOULD BE BETTER OFF DEAD, OR OF HURTING YOURSELF: 0
2. FEELING DOWN, DEPRESSED OR HOPELESS: 0

## 2022-06-14 ENCOUNTER — TELEPHONE (OUTPATIENT)
Dept: CARDIAC REHAB | Age: 72
End: 2022-06-14

## 2022-06-14 NOTE — TELEPHONE ENCOUNTER
6/14/2022 1355 Nutrition: Spoke with patient on this date by phone confirming appointment for 6/15/2022 at 12:00 noon. Will remain available.  Electronically signed by Mitzie Leventhal, RD, LD on 6/14/22 at 1:55 PM EDT

## 2022-06-15 ENCOUNTER — HOSPITAL ENCOUNTER (OUTPATIENT)
Dept: CARDIAC REHAB | Age: 72
Setting detail: THERAPIES SERIES
End: 2022-06-15
Payer: MEDICARE

## 2022-06-15 ENCOUNTER — TELEPHONE (OUTPATIENT)
Dept: CARDIAC REHAB | Age: 72
End: 2022-06-15

## 2022-06-15 NOTE — TELEPHONE ENCOUNTER
Patient presented to cardiac rehab today with complaints of reoccurring high blood pressures within the past week. Blood pressures ranging in the 150-160s over high 80s-90s. Stated that last night around 11:30pm, he experienced blurred vision, dizziness, and sweatiness. Denied any headache, chest pain or another other symptoms. Took blood pressure once able to see clearly and stated it was around 150/80. Blood sugar was around 150-160 at this time. Stated the episode lasted about 5 minutes. Woke up today with no symptoms. Vitals here today were: /98, SPO2 100% and HR was 67 bpm. Felt fine today, just a little tired. Patient stated he received the COVID vaccine just last week. Advised patient to call doctor about recent episode and high blood pressures. No exercise for today.

## 2022-06-15 NOTE — TELEPHONE ENCOUNTER
Also advised patient to go to ED if he experiences these symptoms, stated in message below, occur again

## 2022-06-20 ENCOUNTER — HOSPITAL ENCOUNTER (OUTPATIENT)
Dept: CARDIAC REHAB | Age: 72
Setting detail: THERAPIES SERIES
Discharge: HOME | End: 2022-06-20
Payer: MEDICARE

## 2022-06-20 PROCEDURE — 93798 PHYS/QHP OP CAR RHAB W/ECG: CPT

## 2022-06-22 ENCOUNTER — HOSPITAL ENCOUNTER (OUTPATIENT)
Dept: CARDIAC REHAB | Age: 72
Setting detail: THERAPIES SERIES
Discharge: HOME | End: 2022-06-22
Payer: MEDICARE

## 2022-06-22 PROCEDURE — 93798 PHYS/QHP OP CAR RHAB W/ECG: CPT

## 2022-06-24 ENCOUNTER — HOSPITAL ENCOUNTER (OUTPATIENT)
Dept: CARDIAC REHAB | Age: 72
Setting detail: THERAPIES SERIES
Discharge: HOME | End: 2022-06-24
Payer: MEDICARE

## 2022-06-24 PROCEDURE — 93798 PHYS/QHP OP CAR RHAB W/ECG: CPT

## 2022-06-27 ENCOUNTER — HOSPITAL ENCOUNTER (OUTPATIENT)
Dept: CARDIAC REHAB | Age: 72
Setting detail: THERAPIES SERIES
Discharge: HOME | End: 2022-06-27
Payer: MEDICARE

## 2022-06-27 PROCEDURE — 93798 PHYS/QHP OP CAR RHAB W/ECG: CPT

## 2022-06-28 ENCOUNTER — TELEPHONE (OUTPATIENT)
Dept: CARDIAC REHAB | Age: 72
End: 2022-06-28

## 2022-06-28 NOTE — TELEPHONE ENCOUNTER
6/28/2022 9:34 am Nutrition: Spoke with patient on this date by phone confirming appointment for 6/29/2022 at 10:00 am. Will remain available.  Electronically signed by Gilmar Arce RD, CRYSTAL on 6/28/22 at 9:35 AM EDT

## 2022-06-29 ENCOUNTER — HOSPITAL ENCOUNTER (OUTPATIENT)
Dept: CARDIAC REHAB | Age: 72
Setting detail: THERAPIES SERIES
Discharge: HOME | End: 2022-06-29
Payer: MEDICARE

## 2022-06-29 VITALS — WEIGHT: 313 LBS | BODY MASS INDEX: 40.17 KG/M2 | HEIGHT: 74 IN

## 2022-06-29 PROCEDURE — 93797 PHYS/QHP OP CAR RHAB WO ECG: CPT

## 2022-06-29 PROCEDURE — 93798 PHYS/QHP OP CAR RHAB W/ECG: CPT

## 2022-06-29 NOTE — PROGRESS NOTES
CARDIAC REHABILITATION NUTRITION ASSESSMENT    . NAME: Olinda Grande : 1950 AGE: 73 y.o. GENDER: male    CARDIAC REHAB ADMITTING DIAGNOSIS: NSTEMI then changed to STEMI, s/p PCI     PROBLEM LIST:    Patient Active Problem List   Diagnosis    COVID-19 virus infection    [facility]    Paroxysmal atrial fibrillation ([facility]    Essential hypertension    Morbid obesity ([facility]    Bladder diverticulum    CAD in native artery     Additional pertinent past medical/surgical history: Asthma, Arthritis    LABS:    Hemoglobin A1C   Date Value Ref Range Status   2022 9.2 (H) 4.0 - 5.6 % Final   2020 8.4 (H) 4.0 - 5.6 % Final       Lab Results   Component Value Date    CHOL 174 2022    HDL 35 2022    LDLCALC 102 2022    TRIG 186 2022    LABVLDL 37 2022     MEDICATIONS/SUPPLEMENTS:      Prior to Admission medications    Medication Sig Start Date End Date Taking? Authorizing Provider   terazosin (HYTRIN) 5 MG capsule Take 10 mg by mouth nightly    Historical Provider, MD   Ascorbic Acid (VITAMIN C) 250 MG tablet Take 250 mg by mouth daily    Historical Provider, MD   Misc Natural Products (GLUCOSAMINE CHOND CMP ADVANCED PO) Take 1 tablet by mouth daily    Historical Provider, MD   metoprolol succinate (TOPROL XL) 25 MG extended release tablet Take 3 tablets by mouth daily  Patient taking differently: Take 50 mg by mouth daily  3/27/22   Isabel Jimenez MD   lisinopril (PRINIVIL;ZESTRIL) 20 MG tablet Take 1 tablet by mouth daily 3/27/22   Isabel Jimenez MD   atorvastatin (LIPITOR) 80 MG tablet Take 1 tablet by mouth nightly 3/25/22   Isabel Jimenez MD   nitroGLYCERIN (NITROSTAT) 0.4 MG SL tablet up to max of 3 total doses.  If no relief after 1 dose, call 911. 3/25/22   Isabel Jimenez MD   prasugrel (EFFIENT) 10 MG TABS Take 1 tablet by mouth daily 3/26/22 4/25/22  Isabel Jimenez MD   aspirin 81 MG EC tablet Take 1 tablet by mouth daily 3/26/22   Kyrie Mahoney MD   empagliflozin (JARDIANCE) 10 MG tablet Take 25 mg by mouth daily     Historical Provider, MD   albuterol sulfate HFA (VENTOLIN HFA) 108 (90 Base) MCG/ACT inhaler Inhale 2 puffs into the lungs every 4 hours as needed for Wheezing or Shortness of Breath    Historical Provider, MD   Multiple Vitamins-Minerals (CENTRUM/CERTA-ANABEL WITH MINERALS ORAL) solution Take 15 mLs by mouth daily    Historical Provider, MD   oxymetazoline (AFRIN) 0.05 % nasal spray 2 sprays by Nasal route as needed     Historical Provider, MD   diphenhydrAMINE (BENADRYL) 25 MG tablet Take 50 mg by mouth every 6 hours as needed for Itching    Historical Provider, MD     Additional pertinent lipid lowering medications: Lipitor per order. No grapefruit or grapefruit products. Patient and wife verbalized understanding. ANTHROPOMETRICS:    Ht Readings from Last 1 Encounters:   06/29/22 6' 2\" (1.88 m)      Wt Readings from Last 10 Encounters:   06/29/22 (!) 313 lb (142 kg)   05/16/22 (!) 318 lb 4.8 oz (144.4 kg)   04/20/22 (!) 323 lb (146.5 kg)   03/24/22 (!) 333 lb (151 kg)   03/24/22 (!) 333 lb (151 kg)   10/12/20 (!) 320 lb (145.2 kg)   07/17/20 (!) 331 lb (150.1 kg)   07/13/20 (!) 330 lb (149.7 kg)   06/29/20 (!) 339 lb 8 oz (154 kg)   06/05/20 (!) 344 lb 5 oz (156.2 kg)       BMI Readings from Last 3 Encounters:   06/29/22 40.19 kg/m²   05/16/22 40.87 kg/m²   04/20/22 41.47 kg/m²           IBW:190  # +/- 10%       %IBW: 166 % +/- 10%                BMI: 40.19 (Obesity Class III)     Waist: 59 inches. Waist circumference under 40 inches is goal. Waist circumference high. Patient and wife verbalized understanding. Patient appears morbidly obese and wears glasses. Reported Wt Hx:Patient weight prior to cardiac event: 333 pounds ( stated); Patient cardiac rehab admit weight: 323 pounds. (4/20/22) : weight today: 313 pounds. Patient lost 10 pounds over 2 months indicating 3% weight loss.  Weight loss beneficial and significant. Patient on pre-planned weight loss. Reported Diet Hx:atient is eating 3 meals and 1 snack at night; appetite change since heart attack; eating less food; eating low fat, low cholesterol; allergic to strawberries, tomatoes, cheeries ( red food items); tomato based products; no cultural, Muslim or ethnic food preferences; no problems swallowing food; no chewing problems; own teeth; wife grocery shops ; wife cooks; rarely eats fast food; 2 to 3 times per week eats at sit down restaurants. Rate Your Plate Score: not available     (Score 58-72: Making many healthy choices; 41-57: Some choices need improving 24-40: many choices need improving)       24 HOUR DIET RECALL    Breakfast: 9 am, at home, 1 serving Shredded wheat with skim milk, 1/2 banana, water with pills     Lunch: 12 noon, at home, 1 slice low salt turkey breast and 1 low salt Swiss cheese, wrapped together with  no bread, 1 small cup no sugar applesauce, water  (alternate: peaches or pears)       Dinner:6:00 pm, at home, 1 skinless chicken breast, 1 cup ( cereal )   Salad with skinny girl Balsamic dressing, water to drink       Snacks: 8:00 pm,  At home, 2 to 3 sugar free popsicle and 1/2 cup  sugar free jello, few handful cheese curlers       Beverages  Water: 24 ounces/ ( 3 x per day) equals 72 ounces/day  ; has filted Lavonne pitcher ( occasionally/infrequently Diet Pepsi)    No coffee and no tea        Willow Goodrich is participating in rehab. Environmental/Social:has adequate funds for food; alcohol consumption: none on file; retired      NUTRITION INTERVENTION:    Nutrition 60 minute one-on-one education & goal setting with Willow Goodrich and wife, Cy Contreras. Reviewed with Willow Goodrich relevant labs compared to ideals.     Reviewed weight history and patient's verbalized weight goal as well as any real or perceived barriers to obtaining the goal. Collaborated with patient to set a specific short and long term weight goal.     Conducted a verbal diet recall. Assessed for environmental, financial, psychosocial, physical and comorbidities that may impact the food and eating patterns / behaviors of Negrita Skelton with patient to set specific nutrient goals as well as specific food / behavior changes that will help patient meet the overall goal of following a heart healthy eating pattern (using guidelines as set forth by the American Heart Association and modeled after healthful eating patterns as recognized by the USDA Dietary Guidelines such as DASH, Mediterranean or plant-based). Briefly reviewed with Jeffery Dodd the nutrition information: My Plate, dietary resolutions, serving size, food log, label reading and MNT Heart Healthy Consistent Carbohydrate information and encouraged Jeffery Dodd to read thoroughly, ask questions as needed, and use for future reference for heart healthy nutrition information. Jeffery Dodd  is not scheduled to participate in Cardiac Rehab group nutrition classes. PATIENT GOALS:     Weight Goals:Patient will try to lose 1 pound per week. Short Term Weight Goal:305- 310 lbs    Long Term Weight Goal:300 lbs and continue to re-evaluate BMI and make further adjustments. Nutrition Goals:Patient will try to follow my plate guidelines. Daily Recommendations: Patient will try to eat at least 3 meals and 1 to 2 snacks per day to meet estimated needs.      Calories: 2900 calories  /day minus calories for weight loss     (using [place] [address]. Jeor ( 2500 calories)  with AF 1.3 )    Estimated Total Protein needs: 69 to  86 grams/day ( based on 0.8 to one gram/kg IBW)    Estimated Total Fluid needs: 3500- 4200 ml/day ( based on 25 to 30 ml/kg actual body weight)      Saturated Fat: no more than 20  g/day    Trans Fat: 0 g/day    Sodium: no more than 1500 mg/day    Fruit: 3 cups / day    Vegetables: 2  cups/day         Other:    - read and compare food labels    -patient will eat less salty foods. Keeping a food diary was recommended. Patient is a phase II participant and ITP done. Questions addressed. Follow-up plans discussed Patient is scheduled for follow up visit on August 3, 2022. Contact information provided. Lieutenant Billingsley verbalized understanding. Svai Scott MA, RDN, LD  Contact phone number: [phone].

## 2022-07-01 ENCOUNTER — HOSPITAL ENCOUNTER (OUTPATIENT)
Dept: CARDIAC REHAB | Age: 72
Setting detail: THERAPIES SERIES
Discharge: HOME | End: 2022-07-01
Payer: MEDICARE

## 2022-07-01 PROCEDURE — 93798 PHYS/QHP OP CAR RHAB W/ECG: CPT

## 2022-07-04 ENCOUNTER — APPOINTMENT (OUTPATIENT)
Dept: CARDIAC REHAB | Age: 72
End: 2022-07-04
Payer: MEDICARE

## 2022-07-06 ENCOUNTER — HOSPITAL ENCOUNTER (OUTPATIENT)
Dept: CARDIAC REHAB | Age: 72
Setting detail: THERAPIES SERIES
End: 2022-07-06
Payer: MEDICARE

## 2022-07-08 ENCOUNTER — HOSPITAL ENCOUNTER (OUTPATIENT)
Dept: CARDIAC REHAB | Age: 72
Setting detail: THERAPIES SERIES
Discharge: HOME | End: 2022-07-08
Payer: MEDICARE

## 2022-07-08 PROCEDURE — 93798 PHYS/QHP OP CAR RHAB W/ECG: CPT

## 2022-07-11 ENCOUNTER — HOSPITAL ENCOUNTER (OUTPATIENT)
Dept: CARDIAC REHAB | Age: 72
Setting detail: THERAPIES SERIES
Discharge: HOME | End: 2022-07-11
Payer: MEDICARE

## 2022-07-11 PROCEDURE — 93798 PHYS/QHP OP CAR RHAB W/ECG: CPT

## 2022-07-13 ENCOUNTER — HOSPITAL ENCOUNTER (OUTPATIENT)
Dept: CARDIAC REHAB | Age: 72
Setting detail: THERAPIES SERIES
Discharge: HOME | End: 2022-07-13
Payer: MEDICARE

## 2022-07-13 PROCEDURE — 93798 PHYS/QHP OP CAR RHAB W/ECG: CPT

## 2022-07-13 ASSESSMENT — PATIENT HEALTH QUESTIONNAIRE - PHQ9
10. IF YOU CHECKED OFF ANY PROBLEMS, HOW DIFFICULT HAVE THESE PROBLEMS MADE IT FOR YOU TO DO YOUR WORK, TAKE CARE OF THINGS AT HOME, OR GET ALONG WITH OTHER PEOPLE: 0
SUM OF ALL RESPONSES TO PHQ QUESTIONS 1-9: 2
3. TROUBLE FALLING OR STAYING ASLEEP: 0
9. THOUGHTS THAT YOU WOULD BE BETTER OFF DEAD, OR OF HURTING YOURSELF: 0
1. LITTLE INTEREST OR PLEASURE IN DOING THINGS: 0
7. TROUBLE CONCENTRATING ON THINGS, SUCH AS READING THE NEWSPAPER OR WATCHING TELEVISION: 0
8. MOVING OR SPEAKING SO SLOWLY THAT OTHER PEOPLE COULD HAVE NOTICED. OR THE OPPOSITE, BEING SO FIGETY OR RESTLESS THAT YOU HAVE BEEN MOVING AROUND A LOT MORE THAN USUAL: 0
SUM OF ALL RESPONSES TO PHQ QUESTIONS 1-9: 2
4. FEELING TIRED OR HAVING LITTLE ENERGY: 2
SUM OF ALL RESPONSES TO PHQ QUESTIONS 1-9: 2
2. FEELING DOWN, DEPRESSED OR HOPELESS: 0
5. POOR APPETITE OR OVEREATING: 0
SUM OF ALL RESPONSES TO PHQ9 QUESTIONS 1 & 2: 0
6. FEELING BAD ABOUT YOURSELF - OR THAT YOU ARE A FAILURE OR HAVE LET YOURSELF OR YOUR FAMILY DOWN: 0
SUM OF ALL RESPONSES TO PHQ QUESTIONS 1-9: 2

## 2022-07-18 ENCOUNTER — HOSPITAL ENCOUNTER (OUTPATIENT)
Dept: CARDIAC REHAB | Age: 72
Setting detail: THERAPIES SERIES
Discharge: HOME | End: 2022-07-18
Payer: MEDICARE

## 2022-07-18 PROCEDURE — 93798 PHYS/QHP OP CAR RHAB W/ECG: CPT

## 2022-07-20 ENCOUNTER — HOSPITAL ENCOUNTER (OUTPATIENT)
Dept: CARDIAC REHAB | Age: 72
Setting detail: THERAPIES SERIES
Discharge: HOME | End: 2022-07-20
Payer: MEDICARE

## 2022-07-20 PROCEDURE — 93798 PHYS/QHP OP CAR RHAB W/ECG: CPT

## 2022-08-02 ENCOUNTER — TELEPHONE (OUTPATIENT)
Dept: CARDIAC REHAB | Age: 72
End: 2022-08-02

## 2022-08-02 NOTE — TELEPHONE ENCOUNTER
8/2/2022 1449 Nutrition: RD spoke with patient on this date by phone regarding appointment for 8/3/2022 at 10:30 am. Patient reports appointment cancelled. Patient reports tired and plans to see physician. Will remain available.  Electronically signed by Rivka Peralta RD, LD on 8/2/22 at 2:49 PM EDT

## 2022-08-15 ENCOUNTER — HOSPITAL ENCOUNTER (OUTPATIENT)
Dept: CARDIAC REHAB | Age: 72
Setting detail: THERAPIES SERIES
Discharge: HOME | End: 2022-08-15
Payer: MEDICARE

## 2022-08-15 PROCEDURE — 93798 PHYS/QHP OP CAR RHAB W/ECG: CPT

## 2022-08-17 ENCOUNTER — HOSPITAL ENCOUNTER (OUTPATIENT)
Dept: CARDIAC REHAB | Age: 72
Setting detail: THERAPIES SERIES
Discharge: HOME | End: 2022-08-17
Payer: MEDICARE

## 2022-08-17 PROCEDURE — 93798 PHYS/QHP OP CAR RHAB W/ECG: CPT

## 2022-08-22 ENCOUNTER — HOSPITAL ENCOUNTER (OUTPATIENT)
Dept: CARDIAC REHAB | Age: 72
Setting detail: THERAPIES SERIES
Discharge: HOME | End: 2022-08-22
Payer: MEDICARE

## 2022-08-22 PROCEDURE — 93798 PHYS/QHP OP CAR RHAB W/ECG: CPT

## 2022-08-24 ENCOUNTER — HOSPITAL ENCOUNTER (OUTPATIENT)
Dept: CARDIAC REHAB | Age: 72
Setting detail: THERAPIES SERIES
End: 2022-08-24
Payer: MEDICARE

## 2022-08-26 ENCOUNTER — HOSPITAL ENCOUNTER (OUTPATIENT)
Dept: CARDIAC REHAB | Age: 72
Setting detail: THERAPIES SERIES
Discharge: HOME | End: 2022-08-26
Payer: MEDICARE

## 2022-08-26 PROCEDURE — 93798 PHYS/QHP OP CAR RHAB W/ECG: CPT

## 2022-08-29 NOTE — DISCHARGE INSTRUCTIONS
[address] [facility]  [phone]    Upon completion of your initial Phase II portion of cardiopulmonary rehabilitation, you will need to continue to exercise to maintain your present level of fitness obtained during Phase II to prevent any complications that may occur with lack of activity. This home exercise program is designed to help you achieve your maximum fitness level based on your accomplishments in Phase II. Following are some guidelines to help you exercise safely and effectively. Make exercise part of your daily routine  Do not exercise if you are not feeling well  Wear comfortable shoes and clothes made for exercise  Do not engage in vigorous activities for at least one hour after a heavy meal  Do not drink alcoholic or caffeinated beverages two hours before exercising  Avoid smoking one hour before and right after exercising, try to quit ASAP  Plan to exercise in a climate-controlled environment. Do not exercise outside if it's hotter than 80F, colder than 30F or greater than 75% humidity, and/or very windy  After exercise, take a brief warm shower. Avoid extremely hot or cold water  When exercising outdoors consider:  Letting someone know that you are exercising outdoors. If possible, exercise with someone  Have a plan of action of what to do if you do not feel well during exercise  Carry your identification; especially if you are exercising alone  Wear colors that are easily seen  When exercising in cold weather:  Dress to maintain body heat, but avoid overheating  Dress in layers  Wear a hat and gloves to prevent heat loss  A mask or scarf over your nose and mouth will warm the air as you breathe. Also, inhale through your nose  During your exercise program, if you experience any of the following symptoms STOP!!!  Chest tightness, discomfort, or pain.  If chest pain persists after two minutes, take nitroglycerine pills if prescribed as instructed, make sure you are seated. Light headedness or dizziness. Wait a maximum of two minutes and if there is no relief or an increase in symptoms, STOP and contact your physician IMMEDIATELY! Unusual shortness of breath  Nausea or vomiting   Leg pain or weakness  Palpitations  Irregular heartbeat  Pale, cool, clammy skin    Pulmonary Patients:   Make sure to use your inhaler before exercise and remember to carry your inhaler with you always. Remember to check your oxygen tank before exercising to be sure that you have enough. If you have pulse ox monitor take it with you as well. To get the most out of your home exercise program, you will need to follow the recommended guidelines set up by the staff at the [place] [address]. These guidelines are based on your present conditions. Based on your age, medication and ejection fraction (% of blood pumped from the heart), your personal target heart rate range is  bpm.   (this is what you have been working between 88--100 this is working at your [employer] under [facility]). Frequency: According to ACSM guidelines, it is recommended to exercise most days of the week, maintaining 150 minutes or more of low to moderate intensity. This can be done in combination of aerobic (exercise increasing the cardiovascular system by exchanging oxygen) and dynamic (exercise involving movement of the joints to increase strength and stability).        Intensity:  Low                                               Moderate                                                       High      Modalities Level METs   Airdyne Bike     Arm ergometer 1 - 6 1.5 - 2.3   Elliptical     Nu-Step 1 - 6 1.9 - 2.5   Recumbent Bike     Rower     Treadmill     Steps                  Heart Rate Conversions     10 second count Beats/Minute 10 Second Count Beats/Minute   10 60 21 126    1 66 22 132   12 72 23 138   13 78 24 144   14 84 25 150   15 90 26 156   16 96 27 162   17 102 28 168   18 108 29 174   19 114 30 180   20 120 31 186       Rate of Perceived Dyspnea   0 Nothing at all    .5 Extremely Slight   1 Very Slight   2 Slight   3 Moderate    4 Somewhat Severe   5 Severe   6    7 Very Severe   8    9 Extremely Severe    10 Maximal        Sophia's Rate of Perceived Exertion   6 No Exertion   7 Extremely Light   8    9 Very Light   10    11 Light   12    13 Somewhat Hard   14    15 Hard (Heavy)   16    17 Very Hard   18    19 Extremely Hard   20 Maximal Exertion                          **RPD scale or rate of perceived dyspnea scale is a tool that can be used to determine how breathless you may feel while at rest or during exertion. It is common for your breathing to get heavier with exercise. Your RPD should not exceed a 5 while exercising. **  ** RPE scale or rate of perceived exertion is a tool that can be used to determine how hard you are exerting yourself during daily activities or working out. You should take caution when you feel that you are working at a 12 or higher and you should never work at a rate of 17 or higher.  **    Comments:             Approximate Energy Requirements of Selected Activities  Category Self or Home Care Occupational Recreational Physical Conditioning   VERY LIGHT  < 3 METS  <10mL/kg/min  <4kcals    1 MET= sitting and doing nothing Washing, shaving, dressing  Washing dishes  Driving automobile  Dusting, ironing  Shopping  Using hand tools  Kneading dough  Riding power mower    Sitting (clerical, assembling)  Standing (store clerking, tending bar)  Repairing radios and TVs  Desk work   Operating cranes Shuffleboard, darts  Horseshoes  Bait casting  Billiards, bowling  Archery, skeet  Golf (cart)  Piano playing  Card playing  Horseback riding (walk)  Kayaking, rowing, canoeing (2.5 mph)   Walking (level at 2 mph)  Bicycling (5 mph)  Very light calisthenics  Range of motion exercising    LIGHT  3-5 METs 11-18mL/kg/min  4-6kcals Cleaning windows  Raking leaves  Weeding  Waxing floors (slowly)  Painting  Carrying objects (15-30 lbs)  Gardening, hoeing  Power saw  Pushing  Stocking shelves (light to medium weight)  Moderate walking   Light carpentry  Machine assembly   Auto repair Plastering/drywall  Putting in a sidewalk  Hitching trailers  [address] work  [place]  Interior  Repair/remodeling  Plowing/operating   Tractor  Driving trailer truck [address] (social)  Golfing (walking)  Sailing  Horseback riding (trot)  Volleyball (6 person)  Archery  Table tennis  Softball (noncompetitive)  Ice boating  Kayaking, rowing, canoeing (3 mph) Walking (3-4 mph)  Bicycling (5 mph)  Light calisthenics   Light swimming  Stair climbing (moderate)  Moderate calisthenics    MODERATE  5-7 METs  18-25mL/kg/min  6-8kcals Easy digging in garden  Push mowing (level)   Carrying objects (30-60lbs)  Splitting wood Carpentry (exterior home building)  Shoveling dirt  Using pneumatic tools Badminton (competitive)  Tennis (singles)   Downhill skiing (light)  Backpacking (5 lbs)  Ice or roller skating  Horseback riding (post trot)  Softball (competitive)  Cross-country hiking   [employer], rowing, canoeing (4 mph) Walking (4-5 mph)  Bicycling (9-10 mph)  Swimming (breast stroke)   HEAVY-Not Recommended  7-9 METs  25-32 mL/kg/min  8-10kcals Sawing wood  Heavy shoveling   Climbing a ladder  Carrying heavy objects (60-90lbs)  Carrying 20lbs upstairs Hand sawing hardwood  Tending furnace  Digging ditches  Using pick and shovel  Using sledge hammer  Moving and pushing desks and filing cabinets  Laying railroad track [employer] climbing   [employer] riding (gallop)  Snow shoeing (3mph)  Sledding, tobogganing  Ice hockey  Paddle ball  Touch football  [place], rowing, canoeing (5mph)  Basketball (noncompetitive) Downhill skiing (vigorous)  Cross-country skiing (4 mph) Heavy calisthenics   Jogging (5mph)  Swimming (crawl)  Using rower machine  Walking (6mph)  Bicycling (11-12mph)  Stair climbing (fast)   VERY HEAVY-Not Recommended  >9 METs  >32 mL/kg/min  >10kcals Carrying Objects (>90lb)  Shoveling heavy snow  Shoveling 16lbs x10/min Logging   Heavy labor   Handball  Squash  Cross-country skiing  Basketball (competitive) Running (>6mph)  Bicycling (>13mph) or up a steep hill  Jumping rope

## 2024-08-15 ENCOUNTER — HOSPITAL ENCOUNTER (OUTPATIENT)
Age: 74
Setting detail: OBSERVATION
Discharge: HOME | End: 2024-08-16
Attending: STUDENT IN AN ORGANIZED HEALTH CARE EDUCATION/TRAINING PROGRAM | Admitting: INTERNAL MEDICINE
Payer: MEDICARE

## 2024-08-15 ENCOUNTER — APPOINTMENT (OUTPATIENT)
Dept: GENERAL RADIOLOGY | Age: 74
End: 2024-08-15
Payer: MEDICARE

## 2024-08-15 DIAGNOSIS — R06.02 SHORTNESS OF BREATH: Primary | ICD-10-CM

## 2024-08-15 DIAGNOSIS — R07.9 CHEST PAIN, UNSPECIFIED TYPE: ICD-10-CM

## 2024-08-15 PROBLEM — R94.31 ACUTE ELECTROCARDIOGRAPHY CHANGES: Status: ACTIVE | Noted: 2024-08-15

## 2024-08-15 LAB
ALBUMIN SERPL-MCNC: 3.8 G/DL (ref 3.5–5.2)
ALP SERPL-CCNC: 61 U/L (ref 40–129)
ALT SERPL-CCNC: 16 U/L (ref 0–40)
ANION GAP SERPL CALCULATED.3IONS-SCNC: 15 MMOL/L (ref 7–16)
AST SERPL-CCNC: 9 U/L (ref 0–39)
BASOPHILS # BLD: 0.05 K/UL (ref 0–0.2)
BASOPHILS NFR BLD: 1 % (ref 0–2)
BILIRUB SERPL-MCNC: 0.6 MG/DL (ref 0–1.2)
BNP SERPL-MCNC: 88 PG/ML (ref 0–125)
BUN SERPL-MCNC: 17 MG/DL (ref 6–23)
CALCIUM SERPL-MCNC: 9.4 MG/DL (ref 8.6–10.2)
CHLORIDE SERPL-SCNC: 102 MMOL/L (ref 98–107)
CO2 SERPL-SCNC: 24 MMOL/L (ref 22–29)
CREAT SERPL-MCNC: 1 MG/DL (ref 0.7–1.2)
EKG ATRIAL RATE: 75 BPM
EKG ATRIAL RATE: 78 BPM
EKG P AXIS: 50 DEGREES
EKG P-R INTERVAL: 188 MS
EKG Q-T INTERVAL: 412 MS
EKG Q-T INTERVAL: 446 MS
EKG QRS DURATION: 134 MS
EKG QRS DURATION: 140 MS
EKG QTC CALCULATION (BAZETT): 469 MS
EKG QTC CALCULATION (BAZETT): 498 MS
EKG R AXIS: -76 DEGREES
EKG R AXIS: -78 DEGREES
EKG T AXIS: 20 DEGREES
EKG T AXIS: 28 DEGREES
EKG VENTRICULAR RATE: 75 BPM
EKG VENTRICULAR RATE: 78 BPM
EOSINOPHIL # BLD: 0.13 K/UL (ref 0.05–0.5)
EOSINOPHILS RELATIVE PERCENT: 2 % (ref 0–6)
ERYTHROCYTE [DISTWIDTH] IN BLOOD BY AUTOMATED COUNT: 14 % (ref 11.5–15)
GFR, ESTIMATED: 76 ML/MIN/1.73M2
GLUCOSE SERPL-MCNC: 256 MG/DL (ref 74–99)
HCT VFR BLD AUTO: 41.9 % (ref 37–54)
HGB BLD-MCNC: 14.5 G/DL (ref 12.5–16.5)
IMM GRANULOCYTES # BLD AUTO: 0.03 K/UL (ref 0–0.58)
IMM GRANULOCYTES NFR BLD: 0 % (ref 0–5)
LYMPHOCYTES NFR BLD: 1.64 K/UL (ref 1.5–4)
LYMPHOCYTES RELATIVE PERCENT: 24 % (ref 20–42)
MAGNESIUM SERPL-MCNC: 2 MG/DL (ref 1.6–2.6)
MCH RBC QN AUTO: 30 PG (ref 26–35)
MCHC RBC AUTO-ENTMCNC: 34.6 G/DL (ref 32–34.5)
MCV RBC AUTO: 86.7 FL (ref 80–99.9)
MONOCYTES NFR BLD: 0.4 K/UL (ref 0.1–0.95)
MONOCYTES NFR BLD: 6 % (ref 2–12)
NEUTROPHILS NFR BLD: 67 % (ref 43–80)
NEUTS SEG NFR BLD: 4.57 K/UL (ref 1.8–7.3)
PLATELET # BLD AUTO: 166 K/UL (ref 130–450)
PMV BLD AUTO: 9.6 FL (ref 7–12)
POTASSIUM SERPL-SCNC: 3.5 MMOL/L (ref 3.5–5)
PROT SERPL-MCNC: 6.6 G/DL (ref 6.4–8.3)
RBC # BLD AUTO: 4.83 M/UL (ref 3.8–5.8)
SODIUM SERPL-SCNC: 141 MMOL/L (ref 132–146)
TROPONIN I SERPL HS-MCNC: 19 NG/L (ref 0–11)
TROPONIN I SERPL HS-MCNC: 20 NG/L (ref 0–11)
WBC OTHER # BLD: 6.8 K/UL (ref 4.5–11.5)

## 2024-08-15 PROCEDURE — 84484 ASSAY OF TROPONIN QUANT: CPT

## 2024-08-15 PROCEDURE — 93005 ELECTROCARDIOGRAM TRACING: CPT | Performed by: STUDENT IN AN ORGANIZED HEALTH CARE EDUCATION/TRAINING PROGRAM

## 2024-08-15 PROCEDURE — G0378 HOSPITAL OBSERVATION PER HR: HCPCS

## 2024-08-15 PROCEDURE — 83880 ASSAY OF NATRIURETIC PEPTIDE: CPT

## 2024-08-15 PROCEDURE — 85025 COMPLETE CBC W/AUTO DIFF WBC: CPT

## 2024-08-15 PROCEDURE — 93010 ELECTROCARDIOGRAM REPORT: CPT | Performed by: INTERNAL MEDICINE

## 2024-08-15 PROCEDURE — 71045 X-RAY EXAM CHEST 1 VIEW: CPT

## 2024-08-15 PROCEDURE — 83735 ASSAY OF MAGNESIUM: CPT

## 2024-08-15 PROCEDURE — 80053 COMPREHEN METABOLIC PANEL: CPT

## 2024-08-15 PROCEDURE — 99285 EMERGENCY DEPT VISIT HI MDM: CPT

## 2024-08-15 PROCEDURE — 36415 COLL VENOUS BLD VENIPUNCTURE: CPT

## 2024-08-15 RX ORDER — HYDROCHLOROTHIAZIDE 12.5 MG/1
12.5 TABLET ORAL DAILY
Status: DISCONTINUED | OUTPATIENT
Start: 2024-08-15 | End: 2024-08-15

## 2024-08-15 RX ORDER — LISINOPRIL 10 MG/1
20 TABLET ORAL DAILY
Status: DISCONTINUED | OUTPATIENT
Start: 2024-08-15 | End: 2024-08-15

## 2024-08-15 RX ORDER — ASPIRIN 81 MG/1
81 TABLET ORAL DAILY
Status: DISCONTINUED | OUTPATIENT
Start: 2024-08-16 | End: 2024-08-16 | Stop reason: HOSPADM

## 2024-08-15 RX ORDER — M-VIT,TX,IRON,MINS/CALC/FOLIC 27MG-0.4MG
1 TABLET ORAL DAILY
COMMUNITY

## 2024-08-15 RX ORDER — ONDANSETRON 4 MG/1
4 TABLET, ORALLY DISINTEGRATING ORAL EVERY 8 HOURS PRN
Status: DISCONTINUED | OUTPATIENT
Start: 2024-08-15 | End: 2024-08-16 | Stop reason: HOSPADM

## 2024-08-15 RX ORDER — METOPROLOL SUCCINATE 50 MG/1
50 TABLET, EXTENDED RELEASE ORAL 2 TIMES DAILY
Status: DISCONTINUED | OUTPATIENT
Start: 2024-08-15 | End: 2024-08-16

## 2024-08-15 RX ORDER — ONDANSETRON 2 MG/ML
4 INJECTION INTRAMUSCULAR; INTRAVENOUS EVERY 6 HOURS PRN
Status: DISCONTINUED | OUTPATIENT
Start: 2024-08-15 | End: 2024-08-16 | Stop reason: HOSPADM

## 2024-08-15 RX ORDER — DIPHENHYDRAMINE HCL 25 MG
50 TABLET ORAL EVERY 6 HOURS PRN
Status: DISCONTINUED | OUTPATIENT
Start: 2024-08-15 | End: 2024-08-16 | Stop reason: HOSPADM

## 2024-08-15 RX ORDER — ATORVASTATIN CALCIUM 80 MG/1
80 TABLET, FILM COATED ORAL NIGHTLY
Status: DISCONTINUED | OUTPATIENT
Start: 2024-08-15 | End: 2024-08-16 | Stop reason: HOSPADM

## 2024-08-15 RX ORDER — MAGNESIUM SULFATE IN WATER 40 MG/ML
2000 INJECTION, SOLUTION INTRAVENOUS PRN
Status: DISCONTINUED | OUTPATIENT
Start: 2024-08-15 | End: 2024-08-16 | Stop reason: HOSPADM

## 2024-08-15 RX ORDER — METOPROLOL SUCCINATE 25 MG/1
25 TABLET, EXTENDED RELEASE ORAL DAILY
Status: DISCONTINUED | OUTPATIENT
Start: 2024-08-15 | End: 2024-08-15

## 2024-08-15 RX ORDER — SODIUM CHLORIDE 0.9 % (FLUSH) 0.9 %
5-40 SYRINGE (ML) INJECTION PRN
Status: DISCONTINUED | OUTPATIENT
Start: 2024-08-15 | End: 2024-08-16 | Stop reason: HOSPADM

## 2024-08-15 RX ORDER — CALCIUM CARBONATE 500 MG/1
500 TABLET, CHEWABLE ORAL 3 TIMES DAILY PRN
Status: DISCONTINUED | OUTPATIENT
Start: 2024-08-15 | End: 2024-08-16 | Stop reason: HOSPADM

## 2024-08-15 RX ORDER — PRASUGREL 10 MG/1
10 TABLET, FILM COATED ORAL DAILY
Status: DISCONTINUED | OUTPATIENT
Start: 2024-08-16 | End: 2024-08-16 | Stop reason: HOSPADM

## 2024-08-15 RX ORDER — POTASSIUM CHLORIDE 20 MEQ/1
40 TABLET, EXTENDED RELEASE ORAL PRN
Status: DISCONTINUED | OUTPATIENT
Start: 2024-08-15 | End: 2024-08-16 | Stop reason: HOSPADM

## 2024-08-15 RX ORDER — BENZONATATE 100 MG/1
100 CAPSULE ORAL 3 TIMES DAILY PRN
Status: DISCONTINUED | OUTPATIENT
Start: 2024-08-15 | End: 2024-08-16 | Stop reason: HOSPADM

## 2024-08-15 RX ORDER — ACETAMINOPHEN 325 MG/1
650 TABLET ORAL EVERY 6 HOURS PRN
Status: DISCONTINUED | OUTPATIENT
Start: 2024-08-15 | End: 2024-08-16 | Stop reason: HOSPADM

## 2024-08-15 RX ORDER — METOPROLOL SUCCINATE 50 MG/1
50 TABLET, EXTENDED RELEASE ORAL 2 TIMES DAILY
COMMUNITY

## 2024-08-15 RX ORDER — SODIUM CHLORIDE 0.9 % (FLUSH) 0.9 %
5-40 SYRINGE (ML) INJECTION EVERY 12 HOURS SCHEDULED
Status: DISCONTINUED | OUTPATIENT
Start: 2024-08-15 | End: 2024-08-16 | Stop reason: HOSPADM

## 2024-08-15 RX ORDER — ALBUTEROL SULFATE 90 UG/1
2 AEROSOL, METERED RESPIRATORY (INHALATION) EVERY 4 HOURS PRN
Status: DISCONTINUED | OUTPATIENT
Start: 2024-08-15 | End: 2024-08-15 | Stop reason: CLARIF

## 2024-08-15 RX ORDER — LISINOPRIL AND HYDROCHLOROTHIAZIDE 20; 12.5 MG/1; MG/1
2 TABLET ORAL DAILY
COMMUNITY

## 2024-08-15 RX ORDER — LANOLIN ALCOHOL/MO/W.PET/CERES
3 CREAM (GRAM) TOPICAL NIGHTLY PRN
Status: DISCONTINUED | OUTPATIENT
Start: 2024-08-15 | End: 2024-08-16 | Stop reason: HOSPADM

## 2024-08-15 RX ORDER — AMLODIPINE BESYLATE 5 MG/1
5 TABLET ORAL DAILY
COMMUNITY

## 2024-08-15 RX ORDER — HYDROCHLOROTHIAZIDE 25 MG/1
12.5 TABLET ORAL 2 TIMES DAILY
Status: DISCONTINUED | OUTPATIENT
Start: 2024-08-15 | End: 2024-08-16

## 2024-08-15 RX ORDER — SODIUM CHLORIDE 9 MG/ML
INJECTION, SOLUTION INTRAVENOUS PRN
Status: DISCONTINUED | OUTPATIENT
Start: 2024-08-15 | End: 2024-08-16 | Stop reason: HOSPADM

## 2024-08-15 RX ORDER — LISINOPRIL 20 MG/1
20 TABLET ORAL 2 TIMES DAILY
Status: DISCONTINUED | OUTPATIENT
Start: 2024-08-15 | End: 2024-08-16

## 2024-08-15 RX ORDER — OXYMETAZOLINE HYDROCHLORIDE 0.05 G/100ML
2 SPRAY NASAL 2 TIMES DAILY PRN
Status: DISCONTINUED | OUTPATIENT
Start: 2024-08-15 | End: 2024-08-16 | Stop reason: HOSPADM

## 2024-08-15 RX ORDER — POTASSIUM CHLORIDE 20 MEQ/1
20 TABLET, EXTENDED RELEASE ORAL DAILY
COMMUNITY

## 2024-08-15 RX ORDER — AMLODIPINE BESYLATE 5 MG/1
5 TABLET ORAL DAILY
Status: DISCONTINUED | OUTPATIENT
Start: 2024-08-15 | End: 2024-08-16 | Stop reason: HOSPADM

## 2024-08-15 RX ORDER — POTASSIUM CHLORIDE 7.45 MG/ML
10 INJECTION INTRAVENOUS PRN
Status: DISCONTINUED | OUTPATIENT
Start: 2024-08-15 | End: 2024-08-16 | Stop reason: HOSPADM

## 2024-08-15 RX ORDER — PRASUGREL 10 MG/1
10 TABLET, FILM COATED ORAL DAILY
COMMUNITY

## 2024-08-15 RX ORDER — ACETAMINOPHEN 650 MG/1
650 SUPPOSITORY RECTAL EVERY 6 HOURS PRN
Status: DISCONTINUED | OUTPATIENT
Start: 2024-08-15 | End: 2024-08-16 | Stop reason: HOSPADM

## 2024-08-15 RX ORDER — POLYETHYLENE GLYCOL 3350 17 G/17G
17 POWDER, FOR SOLUTION ORAL DAILY PRN
Status: DISCONTINUED | OUTPATIENT
Start: 2024-08-15 | End: 2024-08-16 | Stop reason: HOSPADM

## 2024-08-15 RX ORDER — HYDRALAZINE HYDROCHLORIDE 20 MG/ML
10 INJECTION INTRAMUSCULAR; INTRAVENOUS EVERY 6 HOURS PRN
Status: DISCONTINUED | OUTPATIENT
Start: 2024-08-15 | End: 2024-08-16 | Stop reason: HOSPADM

## 2024-08-15 RX ORDER — ALBUTEROL SULFATE 2.5 MG/3ML
2.5 SOLUTION RESPIRATORY (INHALATION) EVERY 4 HOURS PRN
Status: DISCONTINUED | OUTPATIENT
Start: 2024-08-15 | End: 2024-08-16 | Stop reason: HOSPADM

## 2024-08-15 RX ORDER — METOPROLOL SUCCINATE 50 MG/1
50 TABLET, EXTENDED RELEASE ORAL DAILY
COMMUNITY
End: 2024-08-15

## 2024-08-15 NOTE — H&P
[facility] Hospitalist Group History and Physical      CHIEF COMPLAINT:  chest pain     History of Present Illness:      This is a 74 year old male with past medical history of atrial fibrillation, diabetes diet controlled, hypertension, sleep apnea, STEMI s/p PCI March 2022 who presents to ER with complaints of ongoing chest pain/pressure radiating to shoulder and jaw. He is in a band and played 4 shows in 6 days but states it doesn't feel muscular. He follows closely with cardiology, Dr Smith in [place].     Vitals stable. Lab workup reveals troponin 20 < 19, glucose 256. EKG abnormal. CXR negative.     Informant(s) for H&P: patient     REVIEW OF SYSTEMS:  A comprehensive review of systems was negative except for: what is in the HPI    PMH:  Past Medical History:   Diagnosis Date    Arthritis     lower back    Asthma     Atrial fibrillation (HCC)     Diabetes mellitus (HCC)     diet controlled    H/O cardiovascular stress test 06/26/2020    Lexiscan    Hypertension     Sleep apnea        Surgical History:  Past Surgical History:   Procedure Laterality Date    CHOLECYSTECTOMY  1995    PROSTATECTOMY N/A 10/12/2020    LAPAROSCOPIC ROBOTIC ASSISTED BLADDER DIVERTICULECTOMY WITH FLEXIBLE  CYSTOSCOPY performed by Vic Cadet MD at [facility] OR    TONSILLECTOMY      TURP  08/04/2017    cr    TURP N/A 6/5/2020    CYSTOSCOPY, RETROGRADE, PYELOGRAM. CYSTOGRAM. TRANSURETHRAL RESECTION PROSTATE performed by Jerson Osuna DO at [facility] OR       Medications Prior to Admission:    Prior to Admission medications    Medication Sig Start Date End Date Taking? Authorizing Provider   terazosin (HYTRIN) 5 MG capsule Take 10 mg by mouth nightly    Barbara Lagos MD   Ascorbic Acid (VITAMIN C) 250 MG tablet Take 250 mg by mouth daily    ProviderBarbara MD   Misc Natural Products (GLUCOSAMINE CHOND CMP ADVANCED PO) Take 1 tablet by mouth daily    Barbara Lagos MD   metoprolol succinate (TOPROL XL) 25 MG  atraumatic  Eyes: pupils equal, round, and reactive to light, extraocular eye movements intact, conjunctivae normal  Neck: neck supple and non tender without mass   Pulmonary/Chest: clear to auscultation bilaterally- no wheezes, rales or rhonchi, normal air movement, no respiratory distress  Cardiovascular: normal rate, normal S1 and S2 and no carotid bruits  Abdomen: soft, non-tender, non-distended, normal bowel sounds, no masses or organomegaly  Extremities: no cyanosis, no clubbing and BLE edema  Neurologic: no cranial nerve deficit and speech normal        LABS:  Recent Labs     08/15/24  0937      K 3.5      CO2 24   BUN 17   CREATININE 1.0   GLUCOSE 256*   CALCIUM 9.4       Recent Labs     08/15/24  0937   WBC 6.8   RBC 4.83   HGB 14.5   HCT 41.9   MCV 86.7   MCH 30.0   MCHC 34.6*   RDW 14.0      MPV 9.6       No results for input(s): \"POCGLU\" in the last 72 hours.    Radiology:   XR CHEST 1 VIEW   Final Result   No acute process.             EKG: reviewed - suspect pacer failure       ASSESSMENT:      Active Problems:    * No active hospital problems. *  Resolved Problems:    * No resolved hospital problems. *      PLAN:    Chest pain rule out ACS with extensive cardiac history - EKG abnormal, repeat pending. Troponin 20 < 19. CXR negative.   STEMI s/p PCI March 2022 - continue effient, Jardiance, aspirin, metoprolol.   HTN - continue amlodipine, lisinopril-HCTZ. Monitor BP and adjust accordingly.   Hld - continue statin   PANCHO - compliant with Cpap     Code Status: full   DVT prophylaxis: effient     30 minutes time spent reviewing patient chart, assessing patient, discussing plan of care with patient and family, discussing plan of care with collaborating physician, and charting.    NOTE: This report was transcribed using voice recognition software. Every effort was made to ensure accuracy; however, inadvertent computerized transcription errors may be present.  Electronically signed by

## 2024-08-16 ENCOUNTER — HOSPITAL ENCOUNTER (OUTPATIENT)
Age: 74
End: 2024-08-16
Payer: MEDICARE

## 2024-08-16 VITALS
TEMPERATURE: 97.3 F | OXYGEN SATURATION: 99 % | HEART RATE: 81 BPM | HEIGHT: 73 IN | DIASTOLIC BLOOD PRESSURE: 70 MMHG | WEIGHT: 312.2 LBS | BODY MASS INDEX: 41.38 KG/M2 | SYSTOLIC BLOOD PRESSURE: 104 MMHG | RESPIRATION RATE: 18 BRPM

## 2024-08-16 VITALS
BODY MASS INDEX: 41.35 KG/M2 | SYSTOLIC BLOOD PRESSURE: 148 MMHG | DIASTOLIC BLOOD PRESSURE: 80 MMHG | HEART RATE: 72 BPM | HEIGHT: 73 IN | WEIGHT: 312 LBS

## 2024-08-16 LAB
ALBUMIN SERPL-MCNC: 3.6 G/DL (ref 3.5–5.2)
ALP SERPL-CCNC: 58 U/L (ref 40–129)
ALT SERPL-CCNC: 14 U/L (ref 0–40)
ANION GAP SERPL CALCULATED.3IONS-SCNC: 16 MMOL/L (ref 7–16)
AST SERPL-CCNC: 8 U/L (ref 0–39)
BASOPHILS # BLD: 0.07 K/UL (ref 0–0.2)
BASOPHILS NFR BLD: 1 % (ref 0–2)
BILIRUB SERPL-MCNC: 0.4 MG/DL (ref 0–1.2)
BUN SERPL-MCNC: 18 MG/DL (ref 6–23)
CALCIUM SERPL-MCNC: 8.9 MG/DL (ref 8.6–10.2)
CHLORIDE SERPL-SCNC: 104 MMOL/L (ref 98–107)
CHOLEST SERPL-MCNC: 118 MG/DL
CO2 SERPL-SCNC: 22 MMOL/L (ref 22–29)
CREAT SERPL-MCNC: 0.8 MG/DL (ref 0.7–1.2)
ECHO BSA: 2.7 M2
EKG ATRIAL RATE: 74 BPM
EKG P-R INTERVAL: 198 MS
EKG Q-T INTERVAL: 446 MS
EKG QRS DURATION: 134 MS
EKG QTC CALCULATION (BAZETT): 495 MS
EKG R AXIS: -107 DEGREES
EKG T AXIS: 158 DEGREES
EKG VENTRICULAR RATE: 74 BPM
EOSINOPHIL # BLD: 0.22 K/UL (ref 0.05–0.5)
EOSINOPHILS RELATIVE PERCENT: 3 % (ref 0–6)
ERYTHROCYTE [DISTWIDTH] IN BLOOD BY AUTOMATED COUNT: 13.9 % (ref 11.5–15)
GFR, ESTIMATED: >90 ML/MIN/1.73M2
GLUCOSE SERPL-MCNC: 137 MG/DL (ref 74–99)
HBA1C MFR BLD: 9.2 % (ref 4–5.6)
HCT VFR BLD AUTO: 38 % (ref 37–54)
HDLC SERPL-MCNC: 35 MG/DL
HGB BLD-MCNC: 13.3 G/DL (ref 12.5–16.5)
IMM GRANULOCYTES # BLD AUTO: 0.03 K/UL (ref 0–0.58)
IMM GRANULOCYTES NFR BLD: 0 % (ref 0–5)
LDLC SERPL CALC-MCNC: 64 MG/DL
LYMPHOCYTES NFR BLD: 2.65 K/UL (ref 1.5–4)
LYMPHOCYTES RELATIVE PERCENT: 31 % (ref 20–42)
MAGNESIUM SERPL-MCNC: 2.1 MG/DL (ref 1.6–2.6)
MCH RBC QN AUTO: 30.8 PG (ref 26–35)
MCHC RBC AUTO-ENTMCNC: 35 G/DL (ref 32–34.5)
MCV RBC AUTO: 88 FL (ref 80–99.9)
MONOCYTES NFR BLD: 0.65 K/UL (ref 0.1–0.95)
MONOCYTES NFR BLD: 8 % (ref 2–12)
NEUTROPHILS NFR BLD: 58 % (ref 43–80)
NEUTS SEG NFR BLD: 4.99 K/UL (ref 1.8–7.3)
PLATELET # BLD AUTO: 159 K/UL (ref 130–450)
PMV BLD AUTO: 9.9 FL (ref 7–12)
POTASSIUM SERPL-SCNC: 3.4 MMOL/L (ref 3.5–5)
PROT SERPL-MCNC: 6 G/DL (ref 6.4–8.3)
RBC # BLD AUTO: 4.32 M/UL (ref 3.8–5.8)
SODIUM SERPL-SCNC: 142 MMOL/L (ref 132–146)
STRESS BASELINE DIAS BP: 80 MMHG
STRESS BASELINE HR: 74 BPM
STRESS BASELINE SYS BP: 148 MMHG
STRESS ESTIMATED WORKLOAD: 1 METS
STRESS PEAK DIAS BP: 80 MMHG
STRESS PEAK SYS BP: 148 MMHG
STRESS PERCENT HR ACHIEVED: 71 %
STRESS POST PEAK HR: 105 BPM
STRESS RATE PRESSURE PRODUCT: NORMAL BPM*MMHG
STRESS TARGET HR: 148 BPM
T4 FREE SERPL-MCNC: 1.5 NG/DL (ref 0.9–1.7)
TRIGL SERPL-MCNC: 94 MG/DL
TSH SERPL DL<=0.05 MIU/L-ACNC: 1.06 UIU/ML (ref 0.27–4.2)
VLDLC SERPL CALC-MCNC: 19 MG/DL
WBC OTHER # BLD: 8.6 K/UL (ref 4.5–11.5)

## 2024-08-16 PROCEDURE — 93018 CV STRESS TEST I&R ONLY: CPT | Performed by: INTERNAL MEDICINE

## 2024-08-16 PROCEDURE — G0378 HOSPITAL OBSERVATION PER HR: HCPCS

## 2024-08-16 PROCEDURE — 80053 COMPREHEN METABOLIC PANEL: CPT

## 2024-08-16 PROCEDURE — 93017 CV STRESS TEST TRACING ONLY: CPT

## 2024-08-16 PROCEDURE — 85025 COMPLETE CBC W/AUTO DIFF WBC: CPT

## 2024-08-16 PROCEDURE — 36415 COLL VENOUS BLD VENIPUNCTURE: CPT

## 2024-08-16 PROCEDURE — 6370000000 HC RX 637 (ALT 250 FOR IP): Performed by: INTERNAL MEDICINE

## 2024-08-16 PROCEDURE — 3430000000 HC RX DIAGNOSTIC RADIOPHARMACEUTICAL: Performed by: RADIOLOGY

## 2024-08-16 PROCEDURE — 6370000000 HC RX 637 (ALT 250 FOR IP): Performed by: NURSE PRACTITIONER

## 2024-08-16 PROCEDURE — 78452 HT MUSCLE IMAGE SPECT MULT: CPT

## 2024-08-16 PROCEDURE — 6360000002 HC RX W HCPCS: Performed by: NURSE PRACTITIONER

## 2024-08-16 PROCEDURE — 78452 HT MUSCLE IMAGE SPECT MULT: CPT | Performed by: INTERNAL MEDICINE

## 2024-08-16 PROCEDURE — 83735 ASSAY OF MAGNESIUM: CPT

## 2024-08-16 PROCEDURE — APPSS60 APP SPLIT SHARED TIME 46-60 MINUTES: Performed by: NURSE PRACTITIONER

## 2024-08-16 PROCEDURE — 99223 1ST HOSP IP/OBS HIGH 75: CPT | Performed by: INTERNAL MEDICINE

## 2024-08-16 PROCEDURE — 83036 HEMOGLOBIN GLYCOSYLATED A1C: CPT

## 2024-08-16 PROCEDURE — 80061 LIPID PANEL: CPT

## 2024-08-16 PROCEDURE — 84439 ASSAY OF FREE THYROXINE: CPT

## 2024-08-16 PROCEDURE — 93016 CV STRESS TEST SUPVJ ONLY: CPT | Performed by: INTERNAL MEDICINE

## 2024-08-16 PROCEDURE — A9500 TC99M SESTAMIBI: HCPCS | Performed by: RADIOLOGY

## 2024-08-16 PROCEDURE — 6370000000 HC RX 637 (ALT 250 FOR IP): Performed by: STUDENT IN AN ORGANIZED HEALTH CARE EDUCATION/TRAINING PROGRAM

## 2024-08-16 PROCEDURE — 84443 ASSAY THYROID STIM HORMONE: CPT

## 2024-08-16 PROCEDURE — 2580000003 HC RX 258: Performed by: INTERNAL MEDICINE

## 2024-08-16 RX ORDER — HYDROCHLOROTHIAZIDE 12.5 MG/1
12.5 TABLET ORAL DAILY
Status: DISCONTINUED | OUTPATIENT
Start: 2024-08-16 | End: 2024-08-16 | Stop reason: HOSPADM

## 2024-08-16 RX ORDER — METOPROLOL SUCCINATE 50 MG/1
50 TABLET, EXTENDED RELEASE ORAL DAILY
Status: DISCONTINUED | OUTPATIENT
Start: 2024-08-16 | End: 2024-08-16 | Stop reason: HOSPADM

## 2024-08-16 RX ORDER — REGADENOSON 0.08 MG/ML
0.4 INJECTION, SOLUTION INTRAVENOUS
Status: CANCELLED | OUTPATIENT
Start: 2024-08-16

## 2024-08-16 RX ORDER — PANTOPRAZOLE SODIUM 40 MG/1
40 TABLET, DELAYED RELEASE ORAL
Status: DISCONTINUED | OUTPATIENT
Start: 2024-08-16 | End: 2024-08-16 | Stop reason: HOSPADM

## 2024-08-16 RX ORDER — REGADENOSON 0.08 MG/ML
0.4 INJECTION, SOLUTION INTRAVENOUS
Status: COMPLETED | OUTPATIENT
Start: 2024-08-16 | End: 2024-08-16

## 2024-08-16 RX ORDER — POTASSIUM CHLORIDE 20 MEQ/1
40 TABLET, EXTENDED RELEASE ORAL ONCE
Status: COMPLETED | OUTPATIENT
Start: 2024-08-16 | End: 2024-08-16

## 2024-08-16 RX ORDER — LISINOPRIL 20 MG/1
20 TABLET ORAL DAILY
Status: DISCONTINUED | OUTPATIENT
Start: 2024-08-16 | End: 2024-08-16 | Stop reason: HOSPADM

## 2024-08-16 RX ORDER — ASPIRIN 81 MG/1
81 TABLET ORAL DAILY
Qty: 30 TABLET | Refills: 3 | Status: SHIPPED | OUTPATIENT
Start: 2024-08-17

## 2024-08-16 RX ORDER — TETRAKIS(2-METHOXYISOBUTYLISOCYANIDE)COPPER(I) TETRAFLUOROBORATE 1 MG/ML
13 INJECTION, POWDER, LYOPHILIZED, FOR SOLUTION INTRAVENOUS
Status: COMPLETED | OUTPATIENT
Start: 2024-08-16 | End: 2024-08-16

## 2024-08-16 RX ORDER — TETRAKIS(2-METHOXYISOBUTYLISOCYANIDE)COPPER(I) TETRAFLUOROBORATE 1 MG/ML
35 INJECTION, POWDER, LYOPHILIZED, FOR SOLUTION INTRAVENOUS
Status: COMPLETED | OUTPATIENT
Start: 2024-08-16 | End: 2024-08-16

## 2024-08-16 RX ORDER — TERAZOSIN 10 MG/1
10 CAPSULE ORAL NIGHTLY
Status: DISCONTINUED | OUTPATIENT
Start: 2024-08-16 | End: 2024-08-16 | Stop reason: HOSPADM

## 2024-08-16 RX ADMIN — METOPROLOL SUCCINATE 50 MG: 50 TABLET, EXTENDED RELEASE ORAL at 08:36

## 2024-08-16 RX ADMIN — Medication 36 MILLICURIE: at 13:05

## 2024-08-16 RX ADMIN — PANTOPRAZOLE SODIUM 40 MG: 40 TABLET, DELAYED RELEASE ORAL at 10:31

## 2024-08-16 RX ADMIN — LISINOPRIL 20 MG: 20 TABLET ORAL at 08:35

## 2024-08-16 RX ADMIN — EMPAGLIFLOZIN 10 MG: 10 TABLET, FILM COATED ORAL at 10:32

## 2024-08-16 RX ADMIN — TERAZOSIN 10 MG: 10 CAPSULE ORAL at 01:33

## 2024-08-16 RX ADMIN — PRASUGREL HYDROCHLORIDE 10 MG: 10 TABLET, FILM COATED ORAL at 10:32

## 2024-08-16 RX ADMIN — ATORVASTATIN CALCIUM 80 MG: 80 TABLET, FILM COATED ORAL at 01:33

## 2024-08-16 RX ADMIN — SODIUM CHLORIDE, PRESERVATIVE FREE 10 ML: 5 INJECTION INTRAVENOUS at 01:48

## 2024-08-16 RX ADMIN — REGADENOSON 0.4 MG: 0.08 INJECTION, SOLUTION INTRAVENOUS at 13:02

## 2024-08-16 RX ADMIN — SODIUM CHLORIDE, PRESERVATIVE FREE 10 ML: 5 INJECTION INTRAVENOUS at 08:36

## 2024-08-16 RX ADMIN — AMLODIPINE BESYLATE 5 MG: 5 TABLET ORAL at 08:35

## 2024-08-16 RX ADMIN — ALUMINUM HYDROXIDE, MAGNESIUM HYDROXIDE, AND SIMETHICONE: 1200; 120; 1200 SUSPENSION ORAL at 10:33

## 2024-08-16 RX ADMIN — Medication 13 MILLICURIE: at 11:45

## 2024-08-16 RX ADMIN — POTASSIUM CHLORIDE 40 MEQ: 1500 TABLET, EXTENDED RELEASE ORAL at 10:32

## 2024-08-16 RX ADMIN — HYDROCHLOROTHIAZIDE 12.5 MG: 25 TABLET ORAL at 08:44

## 2024-08-16 RX ADMIN — ASPIRIN 81 MG: 81 TABLET, COATED ORAL at 08:36

## 2024-08-16 ASSESSMENT — PAIN SCALES - GENERAL
PAINLEVEL_OUTOF10: 0
PAINLEVEL_OUTOF10: 0

## 2024-08-16 NOTE — CARE COORDINATION
Transition of care: Cardiology consulted. Pt went for stress test. Labs noted and chart reviewed. Met with pt's wife, An, in room. She said pt lives with her in a 1 story home. Independent with ADLs and drives. DME- CPAP, bp cuff/monitor and a pulse ox. Plan is to return home when medically ready with no needs identified at present. PCP is Dr Gabe Goodwin and pharmacy is [facility] in [place]. Cm/sw will follow.       Case Management Assessment  Initial Evaluation    Date/Time of Evaluation: 8/16/2024 2:30 PM  Assessment Completed by: Anni Adames RN    If patient is discharged prior to next notation, then this note serves as note for discharge by case management.    Patient Name: Herb Rand                   YOB: 1950  Diagnosis: Acute electrocardiography changes [R94.31]                   Date / Time: 8/15/2024  1:50 PM    Patient Admission Status: Observation   Readmission Risk (Low < 19, Mod (19-27), High > 27): No data recorded  Current PCP: Gabe Goodwin MD  PCP verified by CM? Yes    Chart Reviewed: Yes      History Provided by: Spouse  Patient Orientation: Other (see comment) (spoke with pt's wife)    Patient Cognition: Other (see comment) (spoke with pt's wife)    Hospitalization in the last 30 days (Readmission):  No    If yes, Readmission Assessment in  Navigator will be completed.    Advance Directives:      Code Status: Full Code       Discharge Planning:    Patient lives with: Spouse/Significant Other Type of Home: 1 story   Primary Care Giver: Self  Patient Support Systems include: Spouse/Significant Other, Children       Family can provide assistance at DC: Yes  Would you like Case Management to discuss the discharge plan with any other family members/significant others, and if so, who? No  Plans to Return to Present Housing: Yes      Financial    Payor: MEDICARE / Plan: MEDICARE PART A AND B / Product Type: *No Product type* /     Does insurance  require precert for SNF: No      The Plan for Transition of Care is related to the following treatment goals of Acute electrocardiography changes [R94.31]      The Patient and/or Patient Representative Agree with the Discharge Plan?  Yes     Anni Adames RN  Case Management Department  Ph: [phone]

## 2024-08-16 NOTE — ACP (ADVANCE CARE PLANNING)
Advance Care Planning   The patient has the following advanced directives on file:  Advance Directives       Power of  Living Will ACP-Advance Directive ACP-Power of     Filed on 10/12/20 Not on File Not on File Filed          Pt's contact:     Primary Decision Maker: An Rand - Spouse - [phone]    The Patient has the following current code status:    Code Status: Full Code

## 2024-08-16 NOTE — PROGRESS NOTES
Stress results reviewed with Dr Cain Wu for discharge from cardiology standpoint  Electronically signed by YAS KHAN on 8/16/2024 at 2:37 PM

## 2024-08-16 NOTE — PROGRESS NOTES
4 Eyes Skin Assessment     NAME:  Herb Rand  YOB: 1950  MEDICAL RECORD NUMBER:  [id number]    The patient is being assessed for  Admission    I agree that at least one RN has performed a thorough Head to Toe Skin Assessment on the patient. ALL assessment sites listed below have been assessed.      Areas assessed by both nurses:    Head, Face, Ears, Shoulders, Back, Chest, Arms, Elbows, Hands, Sacrum. Buttock, Coccyx, Ischium, Legs. Feet and Heels, and Under Medical Devices         Does the Patient have a Wound? No noted wound(s)       Manuel Prevention initiated by RN: No  Wound Care Orders initiated by RN: No    Pressure Injury (Stage 3,4, Unstageable, DTI, NWPT, and Complex wounds) if present, place Wound referral order by RN under : No    New Ostomies, if present place, Ostomy referral order under : No     Nurse 1 eSignature: Electronically signed by Dee Estrada RN on 8/15/24 at 11:16 PM EDT    **SHARE this note so that the co-signing nurse can place an eSignature**    Nurse 2 eSignature: Electronically signed by Lexi Keating RN on 8/16/24 at 12:56 AM EDT

## 2024-08-16 NOTE — FLOWSHEET NOTE
Patient discharged with the following belongings. IV removed, Heart monitor returned to nurses station. Discharge paperwork reviewed with patient. All questions answered.        08/15/24 0460   Belongings   Dental Appliances None   Vision - Corrective Lenses Eyeglasses   Hearing Aid None   Clothing Footwear;Undergarments;At bedside;Pants;Shirt;Socks;Belt   Jewelry Ring;Watch;At bedside   Body Piercings Removed N/A   Electronic Devices Cell Phone;   Weapons (Notify Protective Services/Security) None   Other Valuables Cpap/BiPap;At bedside   Home Medications Kept at bedside   Valuables Given To Patient   Provide Name(s) of Who Valuable(s) Were Given To Herb Rand   Responsible person(s) in the waiting room n/a   Patient approves for provider to speak to responsible person post operatively Yes     Alysia Marie RN

## 2024-08-16 NOTE — CONSULTS
Inpatient Cardiology Consultation      Reason for Consult:  EKG changes and elevated troponin     Consulting Physician: Dr Ricks    Requesting Physician:  Natalie STODDARD    Date of Consultation: 8/16/2024    HISTORY OF PRESENT ILLNESS: 75 yo obese  male known to Dr Smith.     Went to Urgent Care Wednesday due to dysuria and decreased urine output. Told he did not have a UTI. Also complained of upper chest pain feels like indigestion(ongoing for at least a month) with intermittent pain into shoulders and throat only getting @ rest.  Has been taking Tagamet, Gas-X and Pepcid without any relief. Same symptoms as his MI this time but not as intense. He was told his EKG was abnormal @ Urgent Care and was told to go to ED but due to lateness of the day went home. He has not been drinking well. 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 After leaving Urgent Care went home and drank a lot of water> Urine output picked up. Chest pain/indigestion continued. Came to ED 2/2 abnormal EKG.   Sleeps in bed, complaint with C-pap  Chronic BLE swelling. Does not wear compression hose. Schmidt snot wear O2 @ home    Had stress and TTE 11/2022 by Dr Smith(will attempt to obtain records)    [facility] 8/15/2024 //76 HR 60-80's SR,afebrile, and O2 saturation 94% on RA.    Troponin 20>19, p-BNP 88. K+ 3.5, Na 141, K+ 3.5, Bun/Cr 17/1.0, magnesium 2.0, , CBC WNL.    Norvasc 5 mg QD, ASA 81 mg QD, Lipitor 80 mg QD, Jardiance 10 mg QD, lisinopril 20 mg QD, HCTZ 12.5 mg QD, Toprol XL 50 mg QD, Effient 10 mg QD    Currently sitting up in bed in no distress but does still feel what he calls \"mild indigestion\" in his chest.     Please note: past medical records were reviewed per electronic medical record (EMR) - see detailed reports under Past Medical/ Surgical History.   Past Medical History:   cRBBB  CAD on Effient 10 mg QD  6/26/2020 TTE Dr Marquez: Normal LV size. EF 70%. Moderate  Able to climb steps. Laundry in basement and able to carry a basket up the steps by laying basket on step in front of him. No assistive devices.   Code Status: Full Code      Family History: Non-contributory secondary to age      REVIEW OF SYSTEMS:   See HPI    PHYSICAL EXAM:   BP (!) 140/71   Pulse 65   Temp 97.2 °F (36.2 °C) (Temporal)   Resp 20   Ht 1.854 m (6' 1\")   Wt (!) 141.6 kg (312 lb 3.2 oz)   SpO2 98%   BMI 41.19 kg/m²   CONST:  Well developed, obese elderly  male who appears of stated age. Awake, alert and cooperative. No apparent distress.   HEENT:   Head- Normocephalic, atraumatic   Eyes- Conjunctivae pink, anicteric  Throat- Oral mucosa pink and moist  Neck-  Thick. No stridor, trachea midline, no jugular venous distention. No carotid bruit.    CHEST: Chest symmetrical and non-tender to palpation. No accessory muscle use or intercostal retractions  RESPIRATORY: Lung sounds - diminished in the bases, on RA.   CARDIOVASCULAR:     Heart Ausculation- Regular rate and irregular rhythm, no murmur. No s3, s4 or rub   PV: 1+ BLE edema. No varicosities. Pedal pulses palpable, no clubbing or cyanosis   ABDOMEN: Soft, obese, non-tender to light palpation. Bowel sounds present. No palpable masses; no abdominal bruit  MS: Good muscle strength and tone. No atrophy or abnormal movements.   : Deferred  SKIN: Pale, warm and dry no statis dermatitis or ulcers   NEURO / PSYCH: Oriented to person, place and time. Speech clear and appropriate. Follows all commands. Pleasant affect     DATA:    ECG 8/15/2024 @ 0920, reviewed by me:  Sinus rhythm with frequent PACs.  Right bundle branch block.  Left anterior fascicular block.    ECG 8/15/2024 @ 1445, reviewed by me:   Sinus rhythm with sinus arrhythmia and frequent PACs.  Left atrial fascicular block.  Right bundle branch block.    Tele strips: Sinus rhythm with frequent PACs    Diagnostic:    CXR 8/15/2024: no acute process    Labs:   CBC:   Recent Labs

## 2024-08-16 NOTE — FLOWSHEET NOTE
Patient arrived with the following belongings:     08/15/24 0470   Belongings   Dental Appliances None   Vision - Corrective Lenses Eyeglasses   Hearing Aid None   Clothing Footwear;Undergarments;At bedside;Pants;Shirt;Socks   Jewelry Ring;Watch;At bedside   Body Piercings Removed N/A   Electronic Devices Cell Phone;   Weapons (Notify Protective Services/Security) None   Other Valuables Cpap/BiPap;At bedside   Home Medications Kept at bedside   Valuables Given To Patient   Provide Name(s) of Who Valuable(s) Were Given To Herb Rand   Responsible person(s) in the waiting room n/a   Patient approves for provider to speak to responsible person post operatively Yes   Orientation to Room   Patient/Responsible Party informed of Rights and Responsibilities Yes   Orientation to Room Performed Yes

## 2024-08-16 NOTE — PATIENT CARE CONFERENCE
[facility] Quality Flow/Interdisciplinary Rounds Progress Note        Quality Flow Rounds held on August 16, 2024    Disciplines Attending:  Bedside Nurse, , , and Nursing Unit Leadership    Herb Rand was admitted on 8/15/2024  1:50 PM    Anticipated Discharge Date:       Disposition:    Manuel Score:  Manuel Scale Score: 23    Readmission Risk              Risk of Unplanned Readmission:  0           Discussed patient goal for the day, patient clinical progression, and barriers to discharge.  The following Goal(s) of the Day/Commitment(s) have been identified:  Labs - Report Results and keep patient informed of any new orders      Nicole Delarosa RN  August 16, 2024

## 2024-08-18 NOTE — ED PROVIDER NOTES
CONTINUE these medications which have NOT CHANGED    Details   amLODIPine (NORVASC) 5 MG tablet Take 1 tablet by mouth dailyHistorical Med      lisinopril-hydroCHLOROthiazide (PRINZIDE;ZESTORETIC) 20-12.5 MG per tablet Take 2 tablets by mouth dailyHistorical Med      Multiple Vitamins-Minerals (THERAPEUTIC MULTIVITAMIN-MINERALS) tablet Take 1 tablet by mouth dailyHistorical Med      potassium chloride (KLOR-CON M) 20 MEQ extended release tablet Take 1 tablet by mouth dailyHistorical Med      prasugrel (EFFIENT) 10 MG TABS Take 1 tablet by mouth dailyHistorical Med      metoprolol succinate (TOPROL XL) 50 MG extended release tablet Take 1 tablet by mouth in the morning and at bedtimeHistorical Med      vitamin C (ASCORBIC ACID) 500 MG tablet Take 1 tablet by mouth dailyHistorical Med      Misc Natural Products (GLUCOSAMINE CHOND CMP ADVANCED) TABS Take 1 tablet by mouth dailyHistorical Med      empagliflozin (JARDIANCE) 25 MG tablet Take 1 tablet by mouth dailyHistorical Med      terazosin (HYTRIN) 10 MG capsule Take 1 capsule by mouth nightlyHistorical Med      atorvastatin (LIPITOR) 80 MG tablet Take 1 tablet by mouth nightly, Disp-30 tablet, R-3Normal      nitroGLYCERIN (NITROSTAT) 0.4 MG SL tablet up to max of 3 total doses. If no relief after 1 dose, call 911., Disp-25 tablet, R-3Normal      albuterol sulfate HFA (VENTOLIN HFA) 108 (90 Base) MCG/ACT inhaler Inhale 2 puffs into the lungs every 6 hours as needed for Wheezing or Shortness of BreathHistorical Med      oxymetazoline (AFRIN) 0.05 % nasal spray 2 sprays by Nasal route at bedtimeHistorical Med      diphenhydrAMINE (BENADRYL) 25 MG tablet Take 2 tablets by mouth every 6 hours as needed for AllergiesHistorical Med             ALLERGIES     Food and Penicillins    FAMILYHISTORY       Family History   Problem Relation Age of Onset    Cancer Mother         thyroid    Heart Disease Father     Cancer Brother         prostate    No Known Problems Brother

## 2024-08-19 LAB
EKG ATRIAL RATE: 75 BPM
EKG P AXIS: 50 DEGREES
EKG P-R INTERVAL: 188 MS
EKG Q-T INTERVAL: 446 MS
EKG QRS DURATION: 140 MS
EKG QTC CALCULATION (BAZETT): 498 MS
EKG R AXIS: -78 DEGREES
EKG T AXIS: 20 DEGREES
EKG VENTRICULAR RATE: 75 BPM

## 2024-08-20 LAB — PSA SERPL-MCNC: 2.42 NG/ML (ref 0–4)

## 2024-08-21 LAB
EKG ATRIAL RATE: 74 BPM
EKG P-R INTERVAL: 198 MS
EKG Q-T INTERVAL: 446 MS
EKG QRS DURATION: 134 MS
EKG QTC CALCULATION (BAZETT): 495 MS
EKG R AXIS: -107 DEGREES
EKG T AXIS: 158 DEGREES
EKG VENTRICULAR RATE: 74 BPM

## 2024-08-30 ENCOUNTER — HOSPITAL ENCOUNTER (INPATIENT)
Age: 74
LOS: 4 days | Discharge: ANOTHER HOSPITAL | DRG: 287 | End: 2024-09-03
Attending: EMERGENCY MEDICINE | Admitting: INTERNAL MEDICINE
Payer: MEDICARE

## 2024-08-30 ENCOUNTER — APPOINTMENT (OUTPATIENT)
Dept: GENERAL RADIOLOGY | Age: 74
DRG: 287 | End: 2024-08-30
Payer: MEDICARE

## 2024-08-30 ENCOUNTER — APPOINTMENT (OUTPATIENT)
Dept: CT IMAGING | Age: 74
DRG: 287 | End: 2024-08-30
Payer: MEDICARE

## 2024-08-30 DIAGNOSIS — D64.9 ACUTE ANEMIA: ICD-10-CM

## 2024-08-30 DIAGNOSIS — R55 SYNCOPE AND COLLAPSE: Primary | ICD-10-CM

## 2024-08-30 DIAGNOSIS — I25.10 CAD (CORONARY ARTERY DISEASE): ICD-10-CM

## 2024-08-30 DIAGNOSIS — R07.9 CHEST PAIN, UNSPECIFIED TYPE: ICD-10-CM

## 2024-08-30 LAB
ALBUMIN SERPL-MCNC: 3.2 G/DL (ref 3.5–5.2)
ALP SERPL-CCNC: 54 U/L (ref 40–129)
ALT SERPL-CCNC: 12 U/L (ref 0–40)
ANION GAP SERPL CALCULATED.3IONS-SCNC: 11 MMOL/L (ref 7–16)
AST SERPL-CCNC: 8 U/L (ref 0–39)
BASOPHILS # BLD: 0.04 K/UL (ref 0–0.2)
BASOPHILS NFR BLD: 0 % (ref 0–2)
BILIRUB SERPL-MCNC: 0.2 MG/DL (ref 0–1.2)
BNP SERPL-MCNC: 48 PG/ML (ref 0–450)
BUN SERPL-MCNC: 30 MG/DL (ref 6–23)
CALCIUM SERPL-MCNC: 8 MG/DL (ref 8.6–10.2)
CHLORIDE SERPL-SCNC: 101 MMOL/L (ref 98–107)
CO2 SERPL-SCNC: 22 MMOL/L (ref 22–29)
CREAT SERPL-MCNC: 0.8 MG/DL (ref 0.7–1.2)
EOSINOPHIL # BLD: 0.14 K/UL (ref 0.05–0.5)
EOSINOPHILS RELATIVE PERCENT: 1 % (ref 0–6)
ERYTHROCYTE [DISTWIDTH] IN BLOOD BY AUTOMATED COUNT: 13.9 % (ref 11.5–15)
GFR, ESTIMATED: >90 ML/MIN/1.73M2
GLUCOSE SERPL-MCNC: 308 MG/DL (ref 74–99)
HCT VFR BLD AUTO: 31.7 % (ref 37–54)
HGB BLD-MCNC: 10.8 G/DL (ref 12.5–16.5)
IMM GRANULOCYTES # BLD AUTO: 0.03 K/UL (ref 0–0.58)
IMM GRANULOCYTES NFR BLD: 0 % (ref 0–5)
LYMPHOCYTES NFR BLD: 1.57 K/UL (ref 1.5–4)
LYMPHOCYTES RELATIVE PERCENT: 16 % (ref 20–42)
MAGNESIUM SERPL-MCNC: 1.8 MG/DL (ref 1.6–2.6)
MCH RBC QN AUTO: 29.6 PG (ref 26–35)
MCHC RBC AUTO-ENTMCNC: 34.1 G/DL (ref 32–34.5)
MCV RBC AUTO: 86.8 FL (ref 80–99.9)
MONOCYTES NFR BLD: 0.54 K/UL (ref 0.1–0.95)
MONOCYTES NFR BLD: 6 % (ref 2–12)
NEUTROPHILS NFR BLD: 77 % (ref 43–80)
NEUTS SEG NFR BLD: 7.57 K/UL (ref 1.8–7.3)
PLATELET # BLD AUTO: 159 K/UL (ref 130–450)
PMV BLD AUTO: 9.4 FL (ref 7–12)
POTASSIUM SERPL-SCNC: 3 MMOL/L (ref 3.5–5)
PROT SERPL-MCNC: 5.1 G/DL (ref 6.4–8.3)
RBC # BLD AUTO: 3.65 M/UL (ref 3.8–5.8)
SODIUM SERPL-SCNC: 134 MMOL/L (ref 132–146)
TROPONIN I SERPL HS-MCNC: 14 NG/L (ref 0–11)
TROPONIN I SERPL HS-MCNC: 16 NG/L (ref 0–11)
WBC OTHER # BLD: 9.9 K/UL (ref 4.5–11.5)

## 2024-08-30 PROCEDURE — 71045 X-RAY EXAM CHEST 1 VIEW: CPT

## 2024-08-30 PROCEDURE — 84484 ASSAY OF TROPONIN QUANT: CPT

## 2024-08-30 PROCEDURE — 2060000000 HC ICU INTERMEDIATE R&B

## 2024-08-30 PROCEDURE — 93005 ELECTROCARDIOGRAM TRACING: CPT | Performed by: EMERGENCY MEDICINE

## 2024-08-30 PROCEDURE — 71275 CT ANGIOGRAPHY CHEST: CPT

## 2024-08-30 PROCEDURE — 6360000002 HC RX W HCPCS: Performed by: EMERGENCY MEDICINE

## 2024-08-30 PROCEDURE — 96374 THER/PROPH/DIAG INJ IV PUSH: CPT

## 2024-08-30 PROCEDURE — 6370000000 HC RX 637 (ALT 250 FOR IP): Performed by: EMERGENCY MEDICINE

## 2024-08-30 PROCEDURE — 83880 ASSAY OF NATRIURETIC PEPTIDE: CPT

## 2024-08-30 PROCEDURE — 80053 COMPREHEN METABOLIC PANEL: CPT

## 2024-08-30 PROCEDURE — 85025 COMPLETE CBC W/AUTO DIFF WBC: CPT

## 2024-08-30 PROCEDURE — 83735 ASSAY OF MAGNESIUM: CPT

## 2024-08-30 PROCEDURE — 99285 EMERGENCY DEPT VISIT HI MDM: CPT

## 2024-08-30 PROCEDURE — 6360000004 HC RX CONTRAST MEDICATION: Performed by: RADIOLOGY

## 2024-08-30 RX ORDER — IOPAMIDOL 755 MG/ML
75 INJECTION, SOLUTION INTRAVASCULAR
Status: COMPLETED | OUTPATIENT
Start: 2024-08-30 | End: 2024-08-30

## 2024-08-30 RX ORDER — POTASSIUM CHLORIDE 1500 MG/1
40 TABLET, EXTENDED RELEASE ORAL ONCE
Status: COMPLETED | OUTPATIENT
Start: 2024-08-30 | End: 2024-08-30

## 2024-08-30 RX ORDER — FENTANYL CITRATE 50 UG/ML
50 INJECTION, SOLUTION INTRAMUSCULAR; INTRAVENOUS ONCE
Status: COMPLETED | OUTPATIENT
Start: 2024-08-30 | End: 2024-08-30

## 2024-08-30 RX ADMIN — FENTANYL CITRATE 50 MCG: 50 INJECTION, SOLUTION INTRAMUSCULAR; INTRAVENOUS at 20:54

## 2024-08-30 RX ADMIN — IOPAMIDOL 70 ML: 755 INJECTION, SOLUTION INTRAVENOUS at 22:17

## 2024-08-30 RX ADMIN — POTASSIUM CHLORIDE 40 MEQ: 1500 TABLET, EXTENDED RELEASE ORAL at 23:02

## 2024-08-31 LAB
EKG ATRIAL RATE: 83 BPM
EKG P AXIS: 50 DEGREES
EKG P-R INTERVAL: 192 MS
EKG Q-T INTERVAL: 416 MS
EKG QRS DURATION: 136 MS
EKG QTC CALCULATION (BAZETT): 488 MS
EKG R AXIS: -85 DEGREES
EKG T AXIS: 27 DEGREES
EKG VENTRICULAR RATE: 83 BPM
GLUCOSE BLD-MCNC: 189 MG/DL (ref 74–99)
GLUCOSE BLD-MCNC: 214 MG/DL (ref 74–99)
HCT VFR BLD AUTO: 31.2 % (ref 37–54)
HGB BLD-MCNC: 10.3 G/DL (ref 12.5–16.5)
TROPONIN I SERPL HS-MCNC: 15 NG/L (ref 0–11)

## 2024-08-31 PROCEDURE — 2580000003 HC RX 258: Performed by: INTERNAL MEDICINE

## 2024-08-31 PROCEDURE — 93010 ELECTROCARDIOGRAM REPORT: CPT | Performed by: INTERNAL MEDICINE

## 2024-08-31 PROCEDURE — 93005 ELECTROCARDIOGRAM TRACING: CPT | Performed by: INTERNAL MEDICINE

## 2024-08-31 PROCEDURE — 85018 HEMOGLOBIN: CPT

## 2024-08-31 PROCEDURE — 85014 HEMATOCRIT: CPT

## 2024-08-31 PROCEDURE — 6360000002 HC RX W HCPCS: Performed by: INTERNAL MEDICINE

## 2024-08-31 PROCEDURE — 6370000000 HC RX 637 (ALT 250 FOR IP): Performed by: INTERNAL MEDICINE

## 2024-08-31 PROCEDURE — 36415 COLL VENOUS BLD VENIPUNCTURE: CPT

## 2024-08-31 PROCEDURE — 6360000002 HC RX W HCPCS: Performed by: EMERGENCY MEDICINE

## 2024-08-31 PROCEDURE — 84484 ASSAY OF TROPONIN QUANT: CPT

## 2024-08-31 PROCEDURE — 94660 CPAP INITIATION&MGMT: CPT

## 2024-08-31 PROCEDURE — 82962 GLUCOSE BLOOD TEST: CPT

## 2024-08-31 PROCEDURE — 2580000003 HC RX 258: Performed by: EMERGENCY MEDICINE

## 2024-08-31 PROCEDURE — 2060000000 HC ICU INTERMEDIATE R&B

## 2024-08-31 RX ORDER — AMLODIPINE BESYLATE 5 MG/1
5 TABLET ORAL DAILY
Status: DISCONTINUED | OUTPATIENT
Start: 2024-08-31 | End: 2024-09-03 | Stop reason: HOSPADM

## 2024-08-31 RX ORDER — PRASUGREL 10 MG/1
10 TABLET, FILM COATED ORAL DAILY
Status: DISCONTINUED | OUTPATIENT
Start: 2024-08-31 | End: 2024-09-03 | Stop reason: HOSPADM

## 2024-08-31 RX ORDER — ENOXAPARIN SODIUM 100 MG/ML
30 INJECTION SUBCUTANEOUS 2 TIMES DAILY
Status: DISCONTINUED | OUTPATIENT
Start: 2024-08-31 | End: 2024-08-31

## 2024-08-31 RX ORDER — ENOXAPARIN SODIUM 150 MG/ML
110 INJECTION SUBCUTANEOUS ONCE
Status: COMPLETED | OUTPATIENT
Start: 2024-09-01 | End: 2024-09-01

## 2024-08-31 RX ORDER — NITROGLYCERIN 0.4 MG/1
0.4 TABLET SUBLINGUAL EVERY 5 MIN PRN
Status: DISCONTINUED | OUTPATIENT
Start: 2024-08-31 | End: 2024-09-03 | Stop reason: HOSPADM

## 2024-08-31 RX ORDER — M-VIT,TX,IRON,MINS/CALC/FOLIC 27MG-0.4MG
1 TABLET ORAL DAILY
Status: DISCONTINUED | OUTPATIENT
Start: 2024-08-31 | End: 2024-09-03 | Stop reason: HOSPADM

## 2024-08-31 RX ORDER — METOPROLOL SUCCINATE 50 MG/1
50 TABLET, EXTENDED RELEASE ORAL 2 TIMES DAILY
Status: DISCONTINUED | OUTPATIENT
Start: 2024-08-31 | End: 2024-08-31

## 2024-08-31 RX ORDER — POTASSIUM CHLORIDE 1500 MG/1
40 TABLET, EXTENDED RELEASE ORAL PRN
Status: DISCONTINUED | OUTPATIENT
Start: 2024-08-31 | End: 2024-09-03 | Stop reason: HOSPADM

## 2024-08-31 RX ORDER — ENOXAPARIN SODIUM 150 MG/ML
140 INJECTION SUBCUTANEOUS 2 TIMES DAILY
Status: DISCONTINUED | OUTPATIENT
Start: 2024-09-01 | End: 2024-09-02

## 2024-08-31 RX ORDER — ACETAMINOPHEN 650 MG/1
650 SUPPOSITORY RECTAL EVERY 6 HOURS PRN
Status: DISCONTINUED | OUTPATIENT
Start: 2024-08-31 | End: 2024-09-03 | Stop reason: HOSPADM

## 2024-08-31 RX ORDER — ENOXAPARIN SODIUM 150 MG/ML
140 INJECTION SUBCUTANEOUS 2 TIMES DAILY
Status: DISCONTINUED | OUTPATIENT
Start: 2024-09-01 | End: 2024-08-31

## 2024-08-31 RX ORDER — ONDANSETRON 2 MG/ML
4 INJECTION INTRAMUSCULAR; INTRAVENOUS EVERY 6 HOURS PRN
Status: DISCONTINUED | OUTPATIENT
Start: 2024-08-31 | End: 2024-09-03 | Stop reason: HOSPADM

## 2024-08-31 RX ORDER — POLYETHYLENE GLYCOL 3350 17 G/17G
17 POWDER, FOR SOLUTION ORAL DAILY PRN
Status: DISCONTINUED | OUTPATIENT
Start: 2024-08-31 | End: 2024-09-03 | Stop reason: HOSPADM

## 2024-08-31 RX ORDER — ATORVASTATIN CALCIUM 40 MG/1
80 TABLET, FILM COATED ORAL NIGHTLY
Status: DISCONTINUED | OUTPATIENT
Start: 2024-08-31 | End: 2024-09-03 | Stop reason: HOSPADM

## 2024-08-31 RX ORDER — SUCRALFATE 1 G/1
1 TABLET ORAL
COMMUNITY

## 2024-08-31 RX ORDER — OXYMETAZOLINE HYDROCHLORIDE 0.05 G/100ML
2 SPRAY NASAL 2 TIMES DAILY
Status: DISCONTINUED | OUTPATIENT
Start: 2024-08-31 | End: 2024-08-31 | Stop reason: CLARIF

## 2024-08-31 RX ORDER — ASPIRIN 81 MG/1
81 TABLET ORAL DAILY
Status: DISCONTINUED | OUTPATIENT
Start: 2024-08-31 | End: 2024-09-03 | Stop reason: HOSPADM

## 2024-08-31 RX ORDER — SODIUM CHLORIDE 0.9 % (FLUSH) 0.9 %
5-40 SYRINGE (ML) INJECTION EVERY 12 HOURS SCHEDULED
Status: DISCONTINUED | OUTPATIENT
Start: 2024-08-31 | End: 2024-09-03 | Stop reason: HOSPADM

## 2024-08-31 RX ORDER — LANOLIN ALCOHOL/MO/W.PET/CERES
400 CREAM (GRAM) TOPICAL DAILY
COMMUNITY

## 2024-08-31 RX ORDER — ASCORBIC ACID 500 MG
500 TABLET ORAL DAILY
Status: DISCONTINUED | OUTPATIENT
Start: 2024-08-31 | End: 2024-09-03 | Stop reason: HOSPADM

## 2024-08-31 RX ORDER — DOXAZOSIN 2 MG/1
8 TABLET ORAL NIGHTLY
Status: DISCONTINUED | OUTPATIENT
Start: 2024-08-31 | End: 2024-08-31

## 2024-08-31 RX ORDER — TERAZOSIN 10 MG/1
10 CAPSULE ORAL NIGHTLY
Status: DISCONTINUED | OUTPATIENT
Start: 2024-08-31 | End: 2024-09-03 | Stop reason: HOSPADM

## 2024-08-31 RX ORDER — SODIUM CHLORIDE 0.9 % (FLUSH) 0.9 %
5-40 SYRINGE (ML) INJECTION PRN
Status: DISCONTINUED | OUTPATIENT
Start: 2024-08-31 | End: 2024-09-03 | Stop reason: HOSPADM

## 2024-08-31 RX ORDER — POTASSIUM CHLORIDE 1500 MG/1
20 TABLET, EXTENDED RELEASE ORAL DAILY
Status: DISCONTINUED | OUTPATIENT
Start: 2024-08-31 | End: 2024-09-03 | Stop reason: HOSPADM

## 2024-08-31 RX ORDER — GLUCOSAM/CHONDRO/HERB 149/HYAL 750-100 MG
1 TABLET ORAL DAILY
Status: DISCONTINUED | OUTPATIENT
Start: 2024-08-31 | End: 2024-08-31 | Stop reason: CLARIF

## 2024-08-31 RX ORDER — TERAZOSIN 5 MG/1
10 CAPSULE ORAL NIGHTLY
Status: DISCONTINUED | OUTPATIENT
Start: 2024-08-31 | End: 2024-08-31 | Stop reason: CLARIF

## 2024-08-31 RX ORDER — ACETAMINOPHEN 325 MG/1
650 TABLET ORAL EVERY 6 HOURS PRN
Status: DISCONTINUED | OUTPATIENT
Start: 2024-08-31 | End: 2024-09-03 | Stop reason: HOSPADM

## 2024-08-31 RX ORDER — ALBUTEROL SULFATE 0.83 MG/ML
2.5 SOLUTION RESPIRATORY (INHALATION) EVERY 6 HOURS PRN
Status: DISCONTINUED | OUTPATIENT
Start: 2024-08-31 | End: 2024-09-03 | Stop reason: HOSPADM

## 2024-08-31 RX ORDER — METOPROLOL SUCCINATE 25 MG/1
25 TABLET, EXTENDED RELEASE ORAL DAILY
Status: DISCONTINUED | OUTPATIENT
Start: 2024-09-01 | End: 2024-09-03 | Stop reason: HOSPADM

## 2024-08-31 RX ORDER — SODIUM CHLORIDE 9 MG/ML
INJECTION, SOLUTION INTRAVENOUS PRN
Status: DISCONTINUED | OUTPATIENT
Start: 2024-08-31 | End: 2024-09-03 | Stop reason: HOSPADM

## 2024-08-31 RX ORDER — MAGNESIUM SULFATE IN WATER 40 MG/ML
2000 INJECTION, SOLUTION INTRAVENOUS PRN
Status: DISCONTINUED | OUTPATIENT
Start: 2024-08-31 | End: 2024-09-03 | Stop reason: HOSPADM

## 2024-08-31 RX ORDER — ONDANSETRON 4 MG/1
4 TABLET, ORALLY DISINTEGRATING ORAL EVERY 8 HOURS PRN
Status: DISCONTINUED | OUTPATIENT
Start: 2024-08-31 | End: 2024-09-03 | Stop reason: HOSPADM

## 2024-08-31 RX ORDER — POTASSIUM CHLORIDE 7.45 MG/ML
10 INJECTION INTRAVENOUS PRN
Status: DISCONTINUED | OUTPATIENT
Start: 2024-08-31 | End: 2024-09-03 | Stop reason: HOSPADM

## 2024-08-31 RX ADMIN — POTASSIUM CHLORIDE 20 MEQ: 1500 TABLET, EXTENDED RELEASE ORAL at 10:31

## 2024-08-31 RX ADMIN — ATORVASTATIN CALCIUM 80 MG: 40 TABLET, FILM COATED ORAL at 20:55

## 2024-08-31 RX ADMIN — OXYCODONE HYDROCHLORIDE AND ACETAMINOPHEN 500 MG: 500 TABLET ORAL at 10:31

## 2024-08-31 RX ADMIN — ASPIRIN 81 MG: 81 TABLET, COATED ORAL at 10:32

## 2024-08-31 RX ADMIN — NITROGLYCERIN 0.4 MG: 0.4 TABLET, ORALLY DISINTEGRATING SUBLINGUAL at 21:01

## 2024-08-31 RX ADMIN — PRASUGREL 10 MG: 10 TABLET, FILM COATED ORAL at 10:31

## 2024-08-31 RX ADMIN — ENOXAPARIN SODIUM 30 MG: 100 INJECTION SUBCUTANEOUS at 06:57

## 2024-08-31 RX ADMIN — SODIUM CHLORIDE, PRESERVATIVE FREE 10 ML: 5 INJECTION INTRAVENOUS at 20:59

## 2024-08-31 RX ADMIN — ATORVASTATIN CALCIUM 80 MG: 40 TABLET, FILM COATED ORAL at 03:10

## 2024-08-31 RX ADMIN — EMPAGLIFLOZIN 25 MG: 10 TABLET, FILM COATED ORAL at 10:31

## 2024-08-31 RX ADMIN — Medication 10 ML: at 10:35

## 2024-08-31 RX ADMIN — AMLODIPINE BESYLATE 5 MG: 5 TABLET ORAL at 10:31

## 2024-08-31 RX ADMIN — NITROGLYCERIN 0.4 MG: 0.4 TABLET, ORALLY DISINTEGRATING SUBLINGUAL at 20:54

## 2024-08-31 RX ADMIN — Medication 1 TABLET: at 10:31

## 2024-08-31 RX ADMIN — ENOXAPARIN SODIUM 30 MG: 100 INJECTION SUBCUTANEOUS at 17:44

## 2024-08-31 RX ADMIN — PANTOPRAZOLE SODIUM 80 MG: 40 INJECTION, POWDER, FOR SOLUTION INTRAVENOUS at 04:14

## 2024-08-31 RX ADMIN — TERAZOSIN 10 MG: 10 CAPSULE ORAL at 20:56

## 2024-08-31 RX ADMIN — Medication 10 ML: at 21:00

## 2024-08-31 ASSESSMENT — PAIN SCALES - GENERAL
PAINLEVEL_OUTOF10: 0
PAINLEVEL_OUTOF10: 6
PAINLEVEL_OUTOF10: 6
PAINLEVEL_OUTOF10: 0
PAINLEVEL_OUTOF10: 6

## 2024-08-31 ASSESSMENT — PAIN DESCRIPTION - LOCATION
LOCATION: CHEST

## 2024-08-31 NOTE — PROGRESS NOTES
4 Eyes Skin Assessment     NAME:  Hreb Rand  YOB: 1950  MEDICAL RECORD NUMBER:  [id number]    The patient is being assessed for  Admission    I agree that at least one RN has performed a thorough Head to Toe Skin Assessment on the patient. ALL assessment sites listed below have been assessed.      Areas assessed by both nurses:    Head, Face, Ears, Shoulders, Back, Chest, Arms, Elbows, Hands, Sacrum. Buttock, Coccyx, Ischium, and Legs. Feet and Heels        Does the Patient have a Wound? No noted wound(s)       Manuel Prevention initiated by RN: Yes  Wound Care Orders initiated by RN: No    Pressure Injury (Stage 3,4, Unstageable, DTI, NWPT, and Complex wounds) if present, place Wound referral order by RN under : No    New Ostomies, if present place, Ostomy referral order under : No     Nurse 1 eSignature: Electronically signed by Melchor Gillis RN on 8/31/24 at 5:17 AM EDT    **SHARE this note so that the co-signing nurse can place an eSignature**    Nurse 2 eSignature: Electronically signed by Georgi Bar RN on 8/31/24 at 5:19 AM EDT

## 2024-08-31 NOTE — CONSULTS
and recurrent symptoms, I feel that he needs to have cardiac catheterization.  His syncopal episode was associated with seizure activity of his both arms which is concerning for brain ischemia that may have been triggered by ventricular arrhythmia.  If catheterization revealed patency of his RCA stents and no other significant disease then he will need to be evaluated for seizure disorder.          Vitor Key MD, MD, FACC

## 2024-08-31 NOTE — PROGRESS NOTES
Pharmacy Note    Herb Rand was ordered glucosamine.  As per the [facility] Formulary Committee Policy, herbals and certain dietary supplements will be discontinued.  The herbal or dietary supplement may be continued after discharge from the hospital.    Venice Duenas, PharmD 8/31/2024 2:06 AM  KASI: [phone]

## 2024-08-31 NOTE — ED PROVIDER NOTES
ED PROVIDER NOTE    Chief Complaint   Patient presents with    Loss of Consciousness       HPI:  8/30/24,   Time: 8:45 PM EDT       Herb Rand is a 74 y.o. male presenting to the ED for syncope.  Acute onset shortly prior to arrival.  Patient was sitting at rest and watching TV when he had sudden onset of shortness of breath, chest pain, diaphoresis, and subsequently lost consciousness.  Did not fall.  Currently still reports some chest pain and lightheadedness.  No shortness of breath at rest.  He does have chronic shortness of breath with exertion.  He has a history of asthma and CAD status post stent.  He does take prasugrel and baby aspirin.  Denies associated fever, chills, cough, vomiting, diarrhea.  No black or bloody stools.  He does report some abdominal pain in the epigastric area that has been ongoing for several weeks.  He has had some improvement with Carafate that is prescribed by his primary care provider.  He does have recent admission at [facility] 2 weeks ago for shortness of breath and chest pain.  He had a nuclear stress test performed at that time. Follows with Dr. Smith from cardiology.    Chart review: hx of afib, HTN, CAD s/p stent  Lab Results   Component Value Date    LVEF 55 03/25/2022     Reviewed inpatient cardiology consult note from 8/16/2024 by Dr. Ricks:  Dx mildly elevated troponin, no ischemic changes, bilateral shoulder discomfort musculoskeletal, midsternal chest discomfort.  Known CAD with history of inferior STEMI in 2022 and drug-eluting stents to the mid RCA at that time.  Left anterior fascicular block and right bundle branch block.  A-fib.  Rx add Imdur/sublingual nitroglycerin as needed, rest of cardiac medications the same, diabetes management, Lexiscan nuclear stress test.    Reviewed nuclear stress test from 8/16/2024:  Mildly diminished uptake in the basilar inferior segment without change pre and post regadenoson.  Low risk stress test.    Review

## 2024-09-01 LAB
ALBUMIN SERPL-MCNC: 3.3 G/DL (ref 3.5–5.2)
ALP SERPL-CCNC: 49 U/L (ref 40–129)
ALT SERPL-CCNC: 9 U/L (ref 0–40)
ANION GAP SERPL CALCULATED.3IONS-SCNC: 11 MMOL/L (ref 7–16)
AST SERPL-CCNC: 7 U/L (ref 0–39)
BILIRUB SERPL-MCNC: 0.4 MG/DL (ref 0–1.2)
BUN SERPL-MCNC: 41 MG/DL (ref 6–23)
CALCIUM SERPL-MCNC: 8.1 MG/DL (ref 8.6–10.2)
CHLORIDE SERPL-SCNC: 104 MMOL/L (ref 98–107)
CO2 SERPL-SCNC: 22 MMOL/L (ref 22–29)
CREAT SERPL-MCNC: 0.9 MG/DL (ref 0.7–1.2)
EKG ATRIAL RATE: 79 BPM
EKG P AXIS: 34 DEGREES
EKG P-R INTERVAL: 198 MS
EKG Q-T INTERVAL: 392 MS
EKG QRS DURATION: 140 MS
EKG QTC CALCULATION (BAZETT): 449 MS
EKG R AXIS: -64 DEGREES
EKG T AXIS: 16 DEGREES
EKG VENTRICULAR RATE: 79 BPM
ERYTHROCYTE [DISTWIDTH] IN BLOOD BY AUTOMATED COUNT: 14.1 % (ref 11.5–15)
GFR, ESTIMATED: >90 ML/MIN/1.73M2
GLUCOSE BLD-MCNC: 133 MG/DL (ref 74–99)
GLUCOSE SERPL-MCNC: 135 MG/DL (ref 74–99)
HCT VFR BLD AUTO: 28.5 % (ref 37–54)
HGB BLD-MCNC: 9.5 G/DL (ref 12.5–16.5)
MCH RBC QN AUTO: 29.6 PG (ref 26–35)
MCHC RBC AUTO-ENTMCNC: 33.3 G/DL (ref 32–34.5)
MCV RBC AUTO: 88.8 FL (ref 80–99.9)
PLATELET # BLD AUTO: 164 K/UL (ref 130–450)
PMV BLD AUTO: 9.8 FL (ref 7–12)
POTASSIUM SERPL-SCNC: 3 MMOL/L (ref 3.5–5)
POTASSIUM SERPL-SCNC: 3.8 MMOL/L (ref 3.5–5)
PROT SERPL-MCNC: 5.3 G/DL (ref 6.4–8.3)
RBC # BLD AUTO: 3.21 M/UL (ref 3.8–5.8)
SODIUM SERPL-SCNC: 137 MMOL/L (ref 132–146)
WBC OTHER # BLD: 8.6 K/UL (ref 4.5–11.5)

## 2024-09-01 PROCEDURE — 2700000000 HC OXYGEN THERAPY PER DAY

## 2024-09-01 PROCEDURE — 85027 COMPLETE CBC AUTOMATED: CPT

## 2024-09-01 PROCEDURE — 6360000002 HC RX W HCPCS: Performed by: INTERNAL MEDICINE

## 2024-09-01 PROCEDURE — 2580000003 HC RX 258: Performed by: INTERNAL MEDICINE

## 2024-09-01 PROCEDURE — 80053 COMPREHEN METABOLIC PANEL: CPT

## 2024-09-01 PROCEDURE — 93010 ELECTROCARDIOGRAM REPORT: CPT | Performed by: INTERNAL MEDICINE

## 2024-09-01 PROCEDURE — 6370000000 HC RX 637 (ALT 250 FOR IP): Performed by: INTERNAL MEDICINE

## 2024-09-01 PROCEDURE — 84132 ASSAY OF SERUM POTASSIUM: CPT

## 2024-09-01 PROCEDURE — 82962 GLUCOSE BLOOD TEST: CPT

## 2024-09-01 PROCEDURE — 93005 ELECTROCARDIOGRAM TRACING: CPT | Performed by: INTERNAL MEDICINE

## 2024-09-01 PROCEDURE — 36415 COLL VENOUS BLD VENIPUNCTURE: CPT

## 2024-09-01 PROCEDURE — 2060000000 HC ICU INTERMEDIATE R&B

## 2024-09-01 RX ADMIN — METOPROLOL SUCCINATE 25 MG: 25 TABLET, EXTENDED RELEASE ORAL at 10:16

## 2024-09-01 RX ADMIN — POTASSIUM CHLORIDE 10 MEQ: 7.46 INJECTION, SOLUTION INTRAVENOUS at 13:28

## 2024-09-01 RX ADMIN — POTASSIUM CHLORIDE 10 MEQ: 7.46 INJECTION, SOLUTION INTRAVENOUS at 14:28

## 2024-09-01 RX ADMIN — ASPIRIN 81 MG: 81 TABLET, COATED ORAL at 10:16

## 2024-09-01 RX ADMIN — PRASUGREL 10 MG: 10 TABLET, FILM COATED ORAL at 10:17

## 2024-09-01 RX ADMIN — EMPAGLIFLOZIN 25 MG: 10 TABLET, FILM COATED ORAL at 10:16

## 2024-09-01 RX ADMIN — ENOXAPARIN SODIUM 110 MG: 150 INJECTION SUBCUTANEOUS at 00:42

## 2024-09-01 RX ADMIN — ENOXAPARIN SODIUM 140 MG: 150 INJECTION SUBCUTANEOUS at 10:17

## 2024-09-01 RX ADMIN — AMLODIPINE BESYLATE 5 MG: 5 TABLET ORAL at 10:16

## 2024-09-01 RX ADMIN — OXYCODONE HYDROCHLORIDE AND ACETAMINOPHEN 500 MG: 500 TABLET ORAL at 10:16

## 2024-09-01 RX ADMIN — Medication 1 TABLET: at 10:16

## 2024-09-01 RX ADMIN — TERAZOSIN 10 MG: 10 CAPSULE ORAL at 22:11

## 2024-09-01 RX ADMIN — POLYETHYLENE GLYCOL 3350 17 G: 17 POWDER, FOR SOLUTION ORAL at 10:16

## 2024-09-01 RX ADMIN — Medication 10 ML: at 22:19

## 2024-09-01 RX ADMIN — ENOXAPARIN SODIUM 140 MG: 150 INJECTION SUBCUTANEOUS at 22:10

## 2024-09-01 RX ADMIN — ATORVASTATIN CALCIUM 80 MG: 40 TABLET, FILM COATED ORAL at 22:10

## 2024-09-01 RX ADMIN — POTASSIUM CHLORIDE 10 MEQ: 7.46 INJECTION, SOLUTION INTRAVENOUS at 10:28

## 2024-09-01 RX ADMIN — POTASSIUM CHLORIDE 10 MEQ: 7.46 INJECTION, SOLUTION INTRAVENOUS at 11:28

## 2024-09-01 RX ADMIN — POTASSIUM CHLORIDE 10 MEQ: 7.46 INJECTION, SOLUTION INTRAVENOUS at 15:46

## 2024-09-01 RX ADMIN — POTASSIUM CHLORIDE 10 MEQ: 7.46 INJECTION, SOLUTION INTRAVENOUS at 12:28

## 2024-09-01 NOTE — PROGRESS NOTES
Primary Care Physician: Gabe Goodwin MD   Admitting Physician:  Zack Lucero MD  Admission date and time: 8/30/2024  8:27 PM    Room:  [address] [place] [address]    Admitting diagnosis:   Syncope and collapse  Chest pain  Anemia  Coronary artery disease  Anemia  Malnutrition  Hypokalemia  Hypocalcemia  Morbid obesity  BMI 41    Patient Name: Herb Rand  MRN: [id number]    Date of Service: 9/1/2024     Subjective:  Herb is a 74 y.o. male who was seen and examined today,9/1/2024, at the bedside.  Patient had an episode of chest pain last night was given nitroglycerin with good resolution Lovenox increased to to 40 mg subcu every 12 hours patient offers no complaint at this time wife at the bedside    family present during my examination.    Review of System:   Constitutional:   Denies fever or chills, weight loss or gain, fatigue or malaise.  HEENT:   Denies ear pain, sore throat, sinus or eye problems.  Cardiovascular:   Denies any chest pain, irregular heartbeats, or palpitations.   Respiratory:   Denies shortness of breath, coughing, sputum production, hemoptysis, or wheezing.  Gastrointestinal:   Denies nausea, vomiting, diarrhea, or constipation.  Denies any abdominal pain.  Genitourinary:    Denies any urgency, frequency, hematuria. Voiding  without difficulty.  Extremities:   Denies lower extremity swelling, edema or cyanosis.   Neurology:    Denies any headache or focal neurological deficits, Denies generalized weakness or memory difficulty.   Psch:   Denies being anxious or depressed.  Musculoskeletal:    Denies  myalgias, joint complaints or back pain.   Integumentary:   Denies any rashes, ulcers, or excoriations.  Denies bruising.  Hematologic/Lymphatic:  Denies bruising or bleeding.    Physical Exam:  No intake/output data recorded.  No intake or output data in the 24 hours ending 09/01/24 1047No intake/output data recorded.  Patient Vitals for the past 96 hrs (Last 3 readings):   Weight   08/30/24 2047

## 2024-09-01 NOTE — PROGRESS NOTES
Date:  9/1/2024  Patient: Herb Rand  Admission:  8/30/2024  8:27 PM  Admit DX: Syncope and collapse [R55]  Chest pain, unspecified type [R07.9]  Age:  74 y.o., 1950     LOS: 2 days       SUBJECTIVE:        Patient had episode of chest pain last night.  It resolved after he had sublingual nitroglycerin.  His Lovenox was increased to 240 mg subcutaneously every 12 hours.  He is currently asymptomatic.  Vitals are stable.      OBJECTIVE:    /82   Pulse (!) 113   Temp 97.9 °F (36.6 °C) (Oral)   Resp 20   Ht 1.854 m (6' 1\")   Wt (!) 141.5 kg (312 lb)   SpO2 96%   BMI 41.16 kg/m²   No intake or output data in the 24 hours ending 09/01/24 0735      General: Alert and oriented, No acute distress.  Appears as stated age.  Eye:  Pupils are equal, round and reactive to light, Extraocular movements are intact, Normal conjunctiva.    Head: Normocephalic, Normal hearing, Oral mucosa is moist.  Neck: Supple, No carotid bruit, No jugular venous distention, No lymphadenopathy.  Respiratory: Lungs are clear to auscultation, Respirations are non-labored, Breath sounds are equal, Symmetrical chest wall expansion.  Cardiovascular: Normal rate, No murmur, No gallop, Good pulses equal in all extremities, No edema.  Gastrointestinal: Soft, Non-tender, Non-distended, Normal bowel sounds.  Musculoskeletal: Normal strength, No tenderness, No deformity.  Integumentary:  Warm, Dry.    Neurologic: Alert, Oriented, No focal deficits.  Cognition and Speech: Oriented, Speech clear and coherent.  Psychiatric: Cooperative, Appropriate mood & affect, Normal judgment.    Current Inpatient Medications:   empagliflozin  25 mg Oral Daily    atorvastatin  80 mg Oral Nightly    vitamin C  500 mg Oral Daily    amLODIPine  5 mg Oral Daily    therapeutic multivitamin-minerals  1 tablet Oral Daily    potassium chloride  20 mEq Oral Daily    prasugrel  10 mg Oral Daily    aspirin  81 mg Oral Daily    sodium chloride flush  5-40 mL

## 2024-09-01 NOTE — H&P
irregular heartbeats, or palpitations. No paroxysmal nocturnal dyspnea; ++ chest pain    Respiratory:   Denies coughing, sputum production, hemoptysis, or wheezing.  No orthopnea.; ++ shortness of breath    Gastrointestinal:   Denies nausea, vomiting, diarrhea, or constipation.  Denies change in bowel habits or stools.; ++ abdominal pain    Genito-Urinary:    Denies any urgency, frequency, hematuria.  Voiding without difficulty.    Musculoskeletal:   Denies joint pain, joint stiffness, joint swelling or muscle pain    Neurology:    Denies any headache or focal neurological deficits. No weakness or paresthesia.; ++ syncope    Derm:    Denies any rashes, ulcers, or excoriations.  Denies bruising.      Extremities:   Denies any lower extremity swelling or edema.      PHYSICAL EXAM:  VITALS:  Vitals:    08/31/24 2106   BP: (!) 98/55   Pulse: (!) 109   Resp:    Temp:    SpO2:          CONSTITUTIONAL:    Awake, alert, cooperative, no apparent distress, and appears stated age    EYES:    PERRL, EOMI, sclera clear, conjunctiva normal    ENT:    Normocephalic, atraumatic, sinuses nontender on palpation. External ears without lesions. Oral pharynx with moist mucus membranes.  Tonsils without erythema or exudates.    NECK:    Supple, symmetrical, trachea midline, no adenopathy, thyroid symmetric, not enlarged and no tenderness, skin normal, no bruits, no JVD    HEMATOLOGIC/LYMPHATICS:    No cervical lymphadenopathy and no supraclavicular lymphadenopathy    LUNGS:    Symmetric. No increased work of breathing, good air exchange, clear to auscultation bilaterally, no wheezes, rhonchi, or rales,     CARDIOVASCULAR:    Normal apical impulse, regular rate and rhythm, normal S1 and S2, no S3 or S4, and no murmur noted    ABDOMEN:    No scars, normal bowel sounds, soft, non-distended, no masses palpated, no hepatosplenomegaly, no rebound or guarding elicited on palpation; ++ epigastric abdominal tenderness    MUSCULOSKELETAL:    There

## 2024-09-02 PROCEDURE — 6360000002 HC RX W HCPCS: Performed by: INTERNAL MEDICINE

## 2024-09-02 PROCEDURE — 6370000000 HC RX 637 (ALT 250 FOR IP): Performed by: INTERNAL MEDICINE

## 2024-09-02 PROCEDURE — 2580000003 HC RX 258: Performed by: INTERNAL MEDICINE

## 2024-09-02 PROCEDURE — 2060000000 HC ICU INTERMEDIATE R&B

## 2024-09-02 PROCEDURE — 2700000000 HC OXYGEN THERAPY PER DAY

## 2024-09-02 RX ADMIN — AMLODIPINE BESYLATE 5 MG: 5 TABLET ORAL at 09:26

## 2024-09-02 RX ADMIN — METOPROLOL SUCCINATE 25 MG: 25 TABLET, EXTENDED RELEASE ORAL at 09:26

## 2024-09-02 RX ADMIN — OXYCODONE HYDROCHLORIDE AND ACETAMINOPHEN 500 MG: 500 TABLET ORAL at 09:26

## 2024-09-02 RX ADMIN — POTASSIUM CHLORIDE 20 MEQ: 1500 TABLET, EXTENDED RELEASE ORAL at 09:26

## 2024-09-02 RX ADMIN — PRASUGREL 10 MG: 10 TABLET, FILM COATED ORAL at 09:26

## 2024-09-02 RX ADMIN — ASPIRIN 81 MG: 81 TABLET, COATED ORAL at 09:26

## 2024-09-02 RX ADMIN — ENOXAPARIN SODIUM 140 MG: 150 INJECTION SUBCUTANEOUS at 09:27

## 2024-09-02 RX ADMIN — Medication 10 ML: at 09:29

## 2024-09-02 RX ADMIN — Medication 10 ML: at 20:22

## 2024-09-02 RX ADMIN — Medication 1 TABLET: at 09:26

## 2024-09-02 RX ADMIN — ATORVASTATIN CALCIUM 80 MG: 40 TABLET, FILM COATED ORAL at 20:22

## 2024-09-02 RX ADMIN — TERAZOSIN 10 MG: 10 CAPSULE ORAL at 20:22

## 2024-09-02 RX ADMIN — EMPAGLIFLOZIN 25 MG: 10 TABLET, FILM COATED ORAL at 09:26

## 2024-09-02 NOTE — PROGRESS NOTES
Primary Care Physician: Gabe Goodiwn MD   Admitting Physician:  Zack Lucero MD  Admission date and time: 8/30/2024  8:27 PM    Room:  [address] [place] [address]    Admitting diagnosis:   Syncope and collapse  Chest pain  Anemia  Coronary artery disease  Anemia  Malnutrition  Hypokalemia  Hypocalcemia  Morbid obesity  BMI 41    Patient Name: Herb Rand  MRN: [id number]    Date of Service: 9/2/2024     Subjective:  Herb is a 74 y.o. male who was seen and examined today,9/2/2024, at the bedside.  Patient had an episode of chest pain last night was given nitroglycerin with good resolution Lovenox increased to to 40 mg subcu every 12 hours patient offers no complaint at this time wife at the bedside    family present during my examination.    Review of System:   Constitutional:   Denies fever or chills, weight loss or gain, fatigue or malaise.  HEENT:   Denies ear pain, sore throat, sinus or eye problems.  Cardiovascular:   Denies any chest pain, irregular heartbeats, or palpitations.   Respiratory:   Denies shortness of breath, coughing, sputum production, hemoptysis, or wheezing.  Gastrointestinal:   Denies nausea, vomiting, diarrhea, or constipation.  Denies any abdominal pain.  Genitourinary:    Denies any urgency, frequency, hematuria. Voiding  without difficulty.  Extremities:   Denies lower extremity swelling, edema or cyanosis.   Neurology:    Denies any headache or focal neurological deficits, Denies generalized weakness or memory difficulty.   Psch:   Denies being anxious or depressed.  Musculoskeletal:    Denies  myalgias, joint complaints or back pain.   Integumentary:   Denies any rashes, ulcers, or excoriations.  Denies bruising.  Hematologic/Lymphatic:  Denies bruising or bleeding.    Physical Exam:  No intake/output data recorded.  No intake or output data in the 24 hours ending 09/02/24 1626No intake/output data recorded.  Patient Vitals for the past 96 hrs (Last 3 readings):   Weight   08/30/24 2047

## 2024-09-02 NOTE — PROGRESS NOTES
Date:  9/2/2024  Patient: Herb Rand  Admission:  8/30/2024  8:27 PM  Admit DX: Syncope and collapse [R55]  Chest pain, unspecified type [R07.9]  Age:  74 y.o., 1950     LOS: 3 days       SUBJECTIVE:        Patient is seen and examined  He is doing fairly well now.  He has not had any chest pain in the last 24 hours  His wife is in the room.      OBJECTIVE:    /67   Pulse 68   Temp 98.9 °F (37.2 °C) (Oral)   Resp 20   Ht 1.854 m (6' 1\")   Wt (!) 141.5 kg (312 lb)   SpO2 96%   BMI 41.16 kg/m²   No intake or output data in the 24 hours ending 09/02/24 1342      General: Alert and oriented, No acute distress.  Appears as stated age.  Eye:  Pupils are equal, round and reactive to light, Extraocular movements are intact, Normal conjunctiva.    Head: Normocephalic, Normal hearing, Oral mucosa is moist.  Neck: Supple, No carotid bruit, No jugular venous distention, No lymphadenopathy.  Respiratory: Lungs are clear to auscultation, Respirations are non-labored, Breath sounds are equal, Symmetrical chest wall expansion.  Cardiovascular: Normal rate, No murmur, No gallop, Good pulses equal in all extremities, No edema.  Gastrointestinal: Soft, Non-tender, Non-distended, Normal bowel sounds.  Musculoskeletal: Normal strength, No tenderness, No deformity.  Integumentary:  Warm, Dry.    Neurologic: Alert, Oriented, No focal deficits.  Cognition and Speech: Oriented, Speech clear and coherent.  Psychiatric: Cooperative, Appropriate mood & affect, Normal judgment.    Current Inpatient Medications:   empagliflozin  25 mg Oral Daily    atorvastatin  80 mg Oral Nightly    vitamin C  500 mg Oral Daily    amLODIPine  5 mg Oral Daily    therapeutic multivitamin-minerals  1 tablet Oral Daily    potassium chloride  20 mEq Oral Daily    prasugrel  10 mg Oral Daily    aspirin  81 mg Oral Daily    sodium chloride flush  5-40 mL IntraVENous 2 times per day    metoprolol succinate  25 mg Oral Daily    terazosin  10 mg

## 2024-09-03 VITALS
BODY MASS INDEX: 41.35 KG/M2 | OXYGEN SATURATION: 95 % | RESPIRATION RATE: 19 BRPM | DIASTOLIC BLOOD PRESSURE: 85 MMHG | TEMPERATURE: 97.9 F | HEIGHT: 73 IN | HEART RATE: 75 BPM | WEIGHT: 312 LBS | SYSTOLIC BLOOD PRESSURE: 158 MMHG

## 2024-09-03 LAB
EKG ATRIAL RATE: 80 BPM
EKG P AXIS: 49 DEGREES
EKG P-R INTERVAL: 200 MS
EKG Q-T INTERVAL: 442 MS
EKG QRS DURATION: 140 MS
EKG QTC CALCULATION (BAZETT): 509 MS
EKG R AXIS: -63 DEGREES
EKG T AXIS: 25 DEGREES
EKG VENTRICULAR RATE: 80 BPM

## 2024-09-03 PROCEDURE — 2580000003 HC RX 258: Performed by: INTERNAL MEDICINE

## 2024-09-03 PROCEDURE — 6360000002 HC RX W HCPCS: Performed by: INTERNAL MEDICINE

## 2024-09-03 PROCEDURE — 6360000004 HC RX CONTRAST MEDICATION: Performed by: INTERNAL MEDICINE

## 2024-09-03 PROCEDURE — 2500000003 HC RX 250 WO HCPCS: Performed by: SPECIALIST

## 2024-09-03 PROCEDURE — 4A023N7 MEASUREMENT OF CARDIAC SAMPLING AND PRESSURE, LEFT HEART, PERCUTANEOUS APPROACH: ICD-10-PCS | Performed by: INTERNAL MEDICINE

## 2024-09-03 PROCEDURE — 93462 L HRT CATH TRNSPTL PUNCTURE: CPT

## 2024-09-03 PROCEDURE — 93458 L HRT ARTERY/VENTRICLE ANGIO: CPT

## 2024-09-03 PROCEDURE — B2151ZZ FLUOROSCOPY OF LEFT HEART USING LOW OSMOLAR CONTRAST: ICD-10-PCS | Performed by: INTERNAL MEDICINE

## 2024-09-03 PROCEDURE — B2111ZZ FLUOROSCOPY OF MULTIPLE CORONARY ARTERIES USING LOW OSMOLAR CONTRAST: ICD-10-PCS | Performed by: INTERNAL MEDICINE

## 2024-09-03 PROCEDURE — 6370000000 HC RX 637 (ALT 250 FOR IP): Performed by: INTERNAL MEDICINE

## 2024-09-03 RX ORDER — MIDAZOLAM HYDROCHLORIDE 1 MG/ML
INJECTION INTRAMUSCULAR; INTRAVENOUS PRN
Status: DISCONTINUED | OUTPATIENT
Start: 2024-09-03 | End: 2024-09-03 | Stop reason: HOSPADM

## 2024-09-03 RX ORDER — FENTANYL CITRATE 50 UG/ML
INJECTION, SOLUTION INTRAMUSCULAR; INTRAVENOUS PRN
Status: DISCONTINUED | OUTPATIENT
Start: 2024-09-03 | End: 2024-09-03 | Stop reason: HOSPADM

## 2024-09-03 RX ORDER — IOPAMIDOL 755 MG/ML
INJECTION, SOLUTION INTRAVASCULAR PRN
Status: DISCONTINUED | OUTPATIENT
Start: 2024-09-03 | End: 2024-09-03 | Stop reason: HOSPADM

## 2024-09-03 RX ORDER — CLOPIDOGREL 300 MG/1
300 TABLET, FILM COATED ORAL ONCE
Status: DISCONTINUED | OUTPATIENT
Start: 2024-09-03 | End: 2024-09-03 | Stop reason: ALTCHOICE

## 2024-09-03 RX ORDER — SODIUM CHLORIDE 9 MG/ML
INJECTION, SOLUTION INTRAVENOUS CONTINUOUS PRN
Status: COMPLETED | OUTPATIENT
Start: 2024-09-03 | End: 2024-09-03

## 2024-09-03 RX ADMIN — ACETAMINOPHEN 650 MG: 325 TABLET ORAL at 09:46

## 2024-09-03 RX ADMIN — Medication 10 ML: at 08:35

## 2024-09-03 ASSESSMENT — PAIN SCALES - GENERAL: PAINLEVEL_OUTOF10: 3

## 2024-09-03 ASSESSMENT — PAIN DESCRIPTION - DESCRIPTORS: DESCRIPTORS: SHARP

## 2024-09-03 ASSESSMENT — PAIN DESCRIPTION - LOCATION: LOCATION: OTHER (COMMENT)

## 2024-09-03 ASSESSMENT — PAIN DESCRIPTION - ORIENTATION: ORIENTATION: RIGHT

## 2024-09-03 NOTE — PROGRESS NOTES
Patient being transferred to [facility] room 415A report given cody READ. Talked to Natalie at [facility] cath lab report given and face sheet faxed

## 2024-09-03 NOTE — CARE COORDINATION
JORJE.  Plan to transfer to [facility] for Cardiac intervention following his Heart Cath here this am.  Plan will be home at [place], No needs currently noted.    LORI Briseno  Case Management Department  Ph: [phone]

## 2024-09-03 NOTE — PROGRESS NOTES
Primary Care Physician: Gabe Goodwin MD   Admitting Physician:  Zack Lucero MD  Admission date and time: 8/30/2024  8:27 PM    Room:  [facility] ROOM/NONE    Admitting diagnosis:   Syncope and collapse  Chest pain  Anemia  Coronary artery disease  Anemia  Malnutrition  Hypokalemia  Hypocalcemia  Morbid obesity  BMI 41    Patient Name: Herb Rand  MRN: [id number]    Date of Service: 9/3/2024     Subjective:  Herb is a 74 y.o. male who was seen and examined today,9/3/2024, at the bedside.  Patient had an episode of chest pain last night was given nitroglycerin with good resolution Lovenox increased to to 40 mg subcu every 12 hours patient offers no complaint at this time wife at the bedside    family present during my examination.    Review of System:   Constitutional:   Denies fever or chills, weight loss or gain, fatigue or malaise.  HEENT:   Denies ear pain, sore throat, sinus or eye problems.  Cardiovascular:   Denies any chest pain, irregular heartbeats, or palpitations.   Respiratory:   Denies shortness of breath, coughing, sputum production, hemoptysis, or wheezing.  Gastrointestinal:   Denies nausea, vomiting, diarrhea, or constipation.  Denies any abdominal pain.  Genitourinary:    Denies any urgency, frequency, hematuria. Voiding  without difficulty.  Extremities:   Denies lower extremity swelling, edema or cyanosis.   Neurology:    Denies any headache or focal neurological deficits, Denies generalized weakness or memory difficulty.   Psch:   Denies being anxious or depressed.  Musculoskeletal:    Denies  myalgias, joint complaints or back pain.   Integumentary:   Denies any rashes, ulcers, or excoriations.  Denies bruising.  Hematologic/Lymphatic:  Denies bruising or bleeding.    Physical Exam:  No intake/output data recorded.  No intake or output data in the 24 hours ending 09/03/24 0803No intake/output data recorded.  Patient Vitals for the past 96 hrs (Last 3 readings):   Weight

## 2024-09-03 NOTE — PLAN OF CARE
Problem: Discharge Planning  Goal: Discharge to home or other facility with appropriate resources  Outcome: Progressing     Problem: ABCDS Injury Assessment  Goal: Absence of physical injury  Outcome: Progressing     Problem: Safety - Adult  Goal: Free from fall injury  Outcome: Progressing     Problem: Risk for Elopement  Goal: Patient will not exit the unit/facility without proper excort  Outcome: Progressing     
  Problem: Discharge Planning  Goal: Discharge to home or other facility with appropriate resources  Outcome: Progressing     Problem: ABCDS Injury Assessment  Goal: Absence of physical injury  Outcome: Progressing     Problem: Safety - Adult  Goal: Free from fall injury  Outcome: Progressing     Problem: Risk for Elopement  Goal: Patient will not exit the unit/facility without proper excort  Outcome: Progressing  Flowsheets (Taken 9/1/2024 6478)  Nursing Interventions for Elopement Risk: Make sure patient has all necessary personal care items     Problem: Pain  Goal: Verbalizes/displays adequate comfort level or baseline comfort level  Outcome: Progressing     Problem: Chronic Conditions and Co-morbidities  Goal: Patient's chronic conditions and co-morbidity symptoms are monitored and maintained or improved  Outcome: Progressing     
  Problem: Discharge Planning  Goal: Discharge to home or other facility with appropriate resources  Outcome: Progressing  Flowsheets (Taken 9/2/2024 0925)  Discharge to home or other facility with appropriate resources: Identify barriers to discharge with patient and caregiver     Problem: ABCDS Injury Assessment  Goal: Absence of physical injury  Outcome: Progressing  Flowsheets (Taken 9/2/2024 1111)  Absence of Physical Injury: Implement safety measures based on patient assessment     Problem: Safety - Adult  Goal: Free from fall injury  Outcome: Progressing  Flowsheets (Taken 9/2/2024 1111)  Free From Fall Injury: Instruct family/caregiver on patient safety     Problem: Risk for Elopement  Goal: Patient will not exit the unit/facility without proper excort  Outcome: Progressing  Flowsheets (Taken 9/2/2024 0925)  Nursing Interventions for Elopement Risk: Assist with personal care needs such as toileting, eating, dressing, as needed to reduce the risk of wandering     Problem: Pain  Goal: Verbalizes/displays adequate comfort level or baseline comfort level  Outcome: Progressing  Flowsheets (Taken 9/2/2024 0925)  Verbalizes/displays adequate comfort level or baseline comfort level:   Encourage patient to monitor pain and request assistance   Assess pain using appropriate pain scale     Problem: Chronic Conditions and Co-morbidities  Goal: Patient's chronic conditions and co-morbidity symptoms are monitored and maintained or improved  Outcome: Progressing  Flowsheets (Taken 9/2/2024 0925)  Care Plan - Patient's Chronic Conditions and Co-Morbidity Symptoms are Monitored and Maintained or Improved: Monitor and assess patient's chronic conditions and comorbid symptoms for stability, deterioration, or improvement     
Caitlyn Vuong, RN  Outcome: Progressing  9/2/2024 1519 by Morena Meedllin RN  Outcome: Progressing  Flowsheets (Taken 9/2/2024 0925)  Verbalizes/displays adequate comfort level or baseline comfort level:   Encourage patient to monitor pain and request assistance   Assess pain using appropriate pain scale     Problem: Chronic Conditions and Co-morbidities  Goal: Patient's chronic conditions and co-morbidity symptoms are monitored and maintained or improved  9/3/2024 0207 by Caitlny Vuong RN  Outcome: Progressing  Flowsheets (Taken 9/2/2024 2035)  Care Plan - Patient's Chronic Conditions and Co-Morbidity Symptoms are Monitored and Maintained or Improved: Monitor and assess patient's chronic conditions and comorbid symptoms for stability, deterioration, or improvement  9/2/2024 1519 by Morena Medellin, RN  Outcome: Progressing  Flowsheets (Taken 9/2/2024 0925)  Care Plan - Patient's Chronic Conditions and Co-Morbidity Symptoms are Monitored and Maintained or Improved: Monitor and assess patient's chronic conditions and comorbid symptoms for stability, deterioration, or improvement

## 2024-09-03 NOTE — PROGRESS NOTES
Cardiology  Progress Note      SUBJECTIVE: Patient was sleeping with CPAP mask on when I came to see him this morning.  He was chest pain-free this morning    Current Inpatient Medications  Current Facility-Administered Medications: albuterol (PROVENTIL) (2.5 MG/3ML) 0.083% nebulizer solution 2.5 mg, 2.5 mg, Nebulization, Q6H PRN  empagliflozin (JARDIANCE) tablet 25 mg, 25 mg, Oral, Daily  atorvastatin (LIPITOR) tablet 80 mg, 80 mg, Oral, Nightly  nitroGLYCERIN (NITROSTAT) SL tablet 0.4 mg, 0.4 mg, SubLINGual, Q5 Min PRN  ascorbic acid (VITAMIN C) tablet 500 mg, 500 mg, Oral, Daily  amLODIPine (NORVASC) tablet 5 mg, 5 mg, Oral, Daily  therapeutic multivitamin-minerals 1 tablet, 1 tablet, Oral, Daily  potassium chloride (KLOR-CON M) extended release tablet 20 mEq, 20 mEq, Oral, Daily  prasugrel (EFFIENT) tablet 10 mg, 10 mg, Oral, Daily  aspirin EC tablet 81 mg, 81 mg, Oral, Daily  sodium chloride flush 0.9 % injection 5-40 mL, 5-40 mL, IntraVENous, 2 times per day  sodium chloride flush 0.9 % injection 5-40 mL, 5-40 mL, IntraVENous, PRN  0.9 % sodium chloride infusion, , IntraVENous, PRN  potassium chloride (KLOR-CON M) extended release tablet 40 mEq, 40 mEq, Oral, PRN **OR** potassium bicarb-citric acid (EFFER-K) effervescent tablet 40 mEq, 40 mEq, Oral, PRN **OR** potassium chloride 10 mEq/100 mL IVPB (Peripheral Line), 10 mEq, IntraVENous, PRN  magnesium sulfate 2000 mg in 50 mL IVPB premix, 2,000 mg, IntraVENous, PRN  ondansetron (ZOFRAN-ODT) disintegrating tablet 4 mg, 4 mg, Oral, Q8H PRN **OR** ondansetron (ZOFRAN) injection 4 mg, 4 mg, IntraVENous, Q6H PRN  polyethylene glycol (GLYCOLAX) packet 17 g, 17 g, Oral, Daily PRN  acetaminophen (TYLENOL) tablet 650 mg, 650 mg, Oral, Q6H PRN **OR** acetaminophen (TYLENOL) suppository 650 mg, 650 mg, Rectal, Q6H PRN  metoprolol succinate (TOPROL XL) extended release tablet 25 mg, 25 mg, Oral, Daily  terazosin (HYTRIN) capsule 10 mg (Patient Supplied), 10 mg, Oral,

## 2024-09-03 NOTE — PROGRESS NOTES
Nurse to nurse report given to RORO Berg per phone.  Patient transported to 5th floor via bed in stable condition with cardiac monitoring intact.

## 2024-09-19 ENCOUNTER — TELEPHONE (OUTPATIENT)
Dept: CARDIAC REHAB | Age: 74
End: 2024-09-19

## 2024-09-25 ENCOUNTER — HOSPITAL ENCOUNTER (OUTPATIENT)
Dept: CARDIAC REHAB | Age: 74
Setting detail: THERAPIES SERIES
Discharge: HOME | End: 2024-09-25
Payer: MEDICARE

## 2024-09-25 VITALS
HEIGHT: 74 IN | BODY MASS INDEX: 38.95 KG/M2 | WEIGHT: 303.5 LBS | OXYGEN SATURATION: 100 % | HEART RATE: 84 BPM | SYSTOLIC BLOOD PRESSURE: 94 MMHG | DIASTOLIC BLOOD PRESSURE: 60 MMHG | RESPIRATION RATE: 18 BRPM

## 2024-09-25 PROCEDURE — 93798 PHYS/QHP OP CAR RHAB W/ECG: CPT

## 2024-09-25 PROCEDURE — 93797 PHYS/QHP OP CAR RHAB WO ECG: CPT

## 2024-09-25 ASSESSMENT — PATIENT HEALTH QUESTIONNAIRE - PHQ9
4. FEELING TIRED OR HAVING LITTLE ENERGY: NEARLY EVERY DAY
9. THOUGHTS THAT YOU WOULD BE BETTER OFF DEAD, OR OF HURTING YOURSELF: NOT AT ALL
6. FEELING BAD ABOUT YOURSELF - OR THAT YOU ARE A FAILURE OR HAVE LET YOURSELF OR YOUR FAMILY DOWN: NOT AT ALL
SUM OF ALL RESPONSES TO PHQ QUESTIONS 1-9: 3
SUM OF ALL RESPONSES TO PHQ9 QUESTIONS 1 & 2: 0
3. TROUBLE FALLING OR STAYING ASLEEP: NOT AT ALL
SUM OF ALL RESPONSES TO PHQ QUESTIONS 1-9: 3
SUM OF ALL RESPONSES TO PHQ QUESTIONS 1-9: 3
7. TROUBLE CONCENTRATING ON THINGS, SUCH AS READING THE NEWSPAPER OR WATCHING TELEVISION: NOT AT ALL
5. POOR APPETITE OR OVEREATING: NOT AT ALL
8. MOVING OR SPEAKING SO SLOWLY THAT OTHER PEOPLE COULD HAVE NOTICED. OR THE OPPOSITE, BEING SO FIGETY OR RESTLESS THAT YOU HAVE BEEN MOVING AROUND A LOT MORE THAN USUAL: NOT AT ALL
SUM OF ALL RESPONSES TO PHQ QUESTIONS 1-9: 3
10. IF YOU CHECKED OFF ANY PROBLEMS, HOW DIFFICULT HAVE THESE PROBLEMS MADE IT FOR YOU TO DO YOUR WORK, TAKE CARE OF THINGS AT HOME, OR GET ALONG WITH OTHER PEOPLE: NOT DIFFICULT AT ALL
2. FEELING DOWN, DEPRESSED OR HOPELESS: NOT AT ALL
1. LITTLE INTEREST OR PLEASURE IN DOING THINGS: NOT AT ALL

## 2024-09-30 ENCOUNTER — HOSPITAL ENCOUNTER (OUTPATIENT)
Dept: CARDIAC REHAB | Age: 74
Setting detail: THERAPIES SERIES
Discharge: HOME | End: 2024-09-30
Payer: MEDICARE

## 2024-09-30 PROCEDURE — 93798 PHYS/QHP OP CAR RHAB W/ECG: CPT

## 2024-10-02 ENCOUNTER — HOSPITAL ENCOUNTER (OUTPATIENT)
Dept: CARDIAC REHAB | Age: 74
Setting detail: THERAPIES SERIES
Discharge: HOME | End: 2024-10-02
Payer: MEDICARE

## 2024-10-02 PROCEDURE — 93798 PHYS/QHP OP CAR RHAB W/ECG: CPT

## 2024-10-07 ENCOUNTER — APPOINTMENT (OUTPATIENT)
Dept: CARDIAC REHAB | Age: 74
End: 2024-10-07
Payer: MEDICARE

## 2024-10-09 ENCOUNTER — APPOINTMENT (OUTPATIENT)
Dept: CARDIAC REHAB | Age: 74
End: 2024-10-09
Payer: MEDICARE

## 2024-10-14 ENCOUNTER — APPOINTMENT (OUTPATIENT)
Dept: CARDIAC REHAB | Age: 74
End: 2024-10-14
Payer: MEDICARE

## 2024-10-16 ENCOUNTER — APPOINTMENT (OUTPATIENT)
Dept: CARDIAC REHAB | Age: 74
End: 2024-10-16
Payer: MEDICARE

## 2024-10-17 ENCOUNTER — APPOINTMENT (OUTPATIENT)
Dept: GENERAL RADIOLOGY | Age: 74
End: 2024-10-17
Payer: MEDICARE

## 2024-10-17 ENCOUNTER — HOSPITAL ENCOUNTER (EMERGENCY)
Age: 74
Discharge: HOME | End: 2024-10-17
Attending: EMERGENCY MEDICINE
Payer: MEDICARE

## 2024-10-17 VITALS
TEMPERATURE: 98 F | DIASTOLIC BLOOD PRESSURE: 69 MMHG | HEIGHT: 72 IN | OXYGEN SATURATION: 100 % | HEART RATE: 79 BPM | WEIGHT: 255 LBS | SYSTOLIC BLOOD PRESSURE: 109 MMHG | RESPIRATION RATE: 20 BRPM | BODY MASS INDEX: 34.54 KG/M2

## 2024-10-17 DIAGNOSIS — R42 LIGHTHEADEDNESS: Primary | ICD-10-CM

## 2024-10-17 LAB
ANION GAP SERPL CALCULATED.3IONS-SCNC: 10 MMOL/L (ref 7–16)
BASOPHILS # BLD: 0.06 K/UL (ref 0–0.2)
BASOPHILS NFR BLD: 1 % (ref 0–2)
BUN SERPL-MCNC: 24 MG/DL (ref 6–23)
CALCIUM SERPL-MCNC: 8.7 MG/DL (ref 8.6–10.2)
CHLORIDE SERPL-SCNC: 107 MMOL/L (ref 98–107)
CO2 SERPL-SCNC: 23 MMOL/L (ref 22–29)
CREAT SERPL-MCNC: 0.9 MG/DL (ref 0.7–1.2)
EKG ATRIAL RATE: 85 BPM
EKG P AXIS: 9 DEGREES
EKG P-R INTERVAL: 196 MS
EKG Q-T INTERVAL: 402 MS
EKG QRS DURATION: 124 MS
EKG QTC CALCULATION (BAZETT): 478 MS
EKG R AXIS: -63 DEGREES
EKG T AXIS: 10 DEGREES
EKG VENTRICULAR RATE: 85 BPM
EOSINOPHIL # BLD: 0.26 K/UL (ref 0.05–0.5)
EOSINOPHILS RELATIVE PERCENT: 4 % (ref 0–6)
ERYTHROCYTE [DISTWIDTH] IN BLOOD BY AUTOMATED COUNT: 13.5 % (ref 11.5–15)
GFR, ESTIMATED: 86 ML/MIN/1.73M2
GLUCOSE SERPL-MCNC: 139 MG/DL (ref 74–99)
HCT VFR BLD AUTO: 32.4 % (ref 37–54)
HGB BLD-MCNC: 10.3 G/DL (ref 12.5–16.5)
IMM GRANULOCYTES # BLD AUTO: <0.03 K/UL (ref 0–0.58)
IMM GRANULOCYTES NFR BLD: 0 % (ref 0–5)
LYMPHOCYTES NFR BLD: 1.14 K/UL (ref 1.5–4)
LYMPHOCYTES RELATIVE PERCENT: 16 % (ref 20–42)
MCH RBC QN AUTO: 27.6 PG (ref 26–35)
MCHC RBC AUTO-ENTMCNC: 31.8 G/DL (ref 32–34.5)
MCV RBC AUTO: 86.9 FL (ref 80–99.9)
MONOCYTES NFR BLD: 0.42 K/UL (ref 0.1–0.95)
MONOCYTES NFR BLD: 6 % (ref 2–12)
NEUTROPHILS NFR BLD: 74 % (ref 43–80)
NEUTS SEG NFR BLD: 5.41 K/UL (ref 1.8–7.3)
PLATELET # BLD AUTO: 205 K/UL (ref 130–450)
PMV BLD AUTO: 9.5 FL (ref 7–12)
POTASSIUM SERPL-SCNC: 3.6 MMOL/L (ref 3.5–5)
RBC # BLD AUTO: 3.73 M/UL (ref 3.8–5.8)
SODIUM SERPL-SCNC: 140 MMOL/L (ref 132–146)
TROPONIN I SERPL HS-MCNC: 20 NG/L (ref 0–11)
WBC OTHER # BLD: 7.3 K/UL (ref 4.5–11.5)

## 2024-10-17 PROCEDURE — 84484 ASSAY OF TROPONIN QUANT: CPT

## 2024-10-17 PROCEDURE — 85025 COMPLETE CBC W/AUTO DIFF WBC: CPT

## 2024-10-17 PROCEDURE — 93010 ELECTROCARDIOGRAM REPORT: CPT | Performed by: INTERNAL MEDICINE

## 2024-10-17 PROCEDURE — 71045 X-RAY EXAM CHEST 1 VIEW: CPT

## 2024-10-17 PROCEDURE — 80048 BASIC METABOLIC PNL TOTAL CA: CPT

## 2024-10-17 PROCEDURE — 99285 EMERGENCY DEPT VISIT HI MDM: CPT

## 2024-10-17 PROCEDURE — 93005 ELECTROCARDIOGRAM TRACING: CPT | Performed by: EMERGENCY MEDICINE

## 2024-10-17 NOTE — ED PROVIDER NOTES
HPI:  10/17/24,   Time: 12:17 PM EDT         Herb Rand is a 74 y.o. male presenting to the ED for evaluation of of a lightheaded episode prior to arrival.  Patient states he was at radiology getting any scheduled EGD performed.  He had fasted for at least 12 hours beforehand.  He states when he got up to walk to get his EGD done he became lightheaded.  Did not have a syncopal episode.  Over the past several weeks patient has had his blood pressure medications reduced because he has been getting hypotensive as well.  He states that on Labor Day he had a syncopal episode which resulted in him getting 2 coronary artery stents but he states he did not necessarily have a full-blown myocardial infarction at that time.  He states he been eating and drinking normally however has been having acid reflux which is the reason why he was getting his EGD.  He feels better now    ROS:   Pertinent positives and negatives are stated within HPI, all other systems reviewed and are negative.  --------------------------------------------- PAST HISTORY ---------------------------------------------  Past Medical History:  has a past medical history of Arthritis, Asthma, Atrial fibrillation (HCC), Diabetes mellitus (HCC), H/O cardiovascular stress test, Hypertension, and Sleep apnea.    Past Surgical History:  has a past surgical history that includes Cholecystectomy (1995); Tonsillectomy; TURP (08/04/2017); TURP (N/A, 6/5/2020); Prostatectomy (N/A, 10/12/2020); and Cardiac procedure (N/A, 9/3/2024).    Social History:  reports that he has never smoked. He has never used smokeless tobacco. He reports that he does not currently use alcohol. He reports that he does not use drugs.    Family History: family history includes Cancer in his brother and mother; Heart Disease in his father; No Known Problems in his brother.     The patient’s home medications have been reviewed.    Allergies: Food, Penicillins, and

## 2024-10-21 ENCOUNTER — APPOINTMENT (OUTPATIENT)
Dept: CARDIAC REHAB | Age: 74
End: 2024-10-21
Payer: MEDICARE

## 2024-10-22 ENCOUNTER — HOSPITAL ENCOUNTER (OUTPATIENT)
Dept: CARDIAC REHAB | Age: 74
Setting detail: THERAPIES SERIES
Discharge: HOME | End: 2024-10-22
Payer: MEDICARE

## 2024-10-22 NOTE — PROGRESS NOTES
10/22/24 1042   Treatment Diagnosis   Treatment Diagnosis 1 PCI   PCI Date 09/04/24   Significant Cardiovascular History Previous PCI   Co-morbidities Diabetes;Pulmonary disease   Individual Treatment Plan   ITP Visit Type Discharge, did not complete program   Visit #/Total Visits '4/36   Psychosocial Intervention   Interventions   (unable ot obtain current PHQ9 score)     Due to other health issues PT will not be continuing w/ CR

## 2024-10-23 PROBLEM — E11.9 DIABETES MELLITUS (MULTI): Status: ACTIVE | Noted: 2024-10-23

## 2024-10-23 PROBLEM — E78.5 HYPERLIPEMIA: Status: ACTIVE | Noted: 2024-10-23

## 2024-10-23 PROBLEM — I10 ESSENTIAL HYPERTENSION: Status: ACTIVE | Noted: 2024-10-23

## 2024-10-23 PROBLEM — I48.0 PAROXYSMAL ATRIAL FIBRILLATION (MULTI): Status: ACTIVE | Noted: 2024-10-23

## 2024-10-23 PROBLEM — C61 MALIGNANT NEOPLASM OF PROSTATE (MULTI): Status: ACTIVE | Noted: 2024-10-23

## 2024-10-23 PROBLEM — I25.10 ARTERIOSCLEROSIS OF CORONARY ARTERY: Status: ACTIVE | Noted: 2022-03-24

## 2024-10-23 PROBLEM — G47.33 OSA ON CPAP: Status: ACTIVE | Noted: 2024-10-23

## 2024-10-23 PROBLEM — C15.9 MALIGNANT NEOPLASM OF ESOPHAGUS: Status: ACTIVE | Noted: 2024-10-21

## 2024-10-23 PROBLEM — I21.4 NON-ST ELEVATION (NSTEMI) MYOCARDIAL INFARCTION (MULTI): Status: ACTIVE | Noted: 2024-10-23

## 2024-10-23 PROBLEM — J45.909 ASTHMA: Status: ACTIVE | Noted: 2024-10-23

## 2024-10-28 ENCOUNTER — APPOINTMENT (OUTPATIENT)
Dept: CARDIAC REHAB | Age: 74
End: 2024-10-28
Payer: MEDICARE

## 2024-10-28 LAB — HGB BLD-MCNC: 9.2 G/DL (ref 12.5–16.5)

## 2024-10-29 ENCOUNTER — DOCUMENTATION (OUTPATIENT)
Dept: HEMATOLOGY/ONCOLOGY | Facility: HOSPITAL | Age: 74
End: 2024-10-29
Payer: MEDICARE

## 2024-10-30 ENCOUNTER — APPOINTMENT (OUTPATIENT)
Dept: CARDIAC REHAB | Age: 74
End: 2024-10-30
Payer: MEDICARE

## 2024-10-30 ENCOUNTER — OFFICE VISIT (OUTPATIENT)
Dept: SURGERY | Facility: HOSPITAL | Age: 74
End: 2024-10-30
Payer: MEDICARE

## 2024-10-30 VITALS
HEART RATE: 82 BPM | TEMPERATURE: 97.2 F | SYSTOLIC BLOOD PRESSURE: 139 MMHG | DIASTOLIC BLOOD PRESSURE: 60 MMHG | RESPIRATION RATE: 18 BRPM | OXYGEN SATURATION: 99 %

## 2024-10-30 DIAGNOSIS — C15.5 MALIGNANT NEOPLASM OF LOWER THIRD OF ESOPHAGUS (MULTI): ICD-10-CM

## 2024-10-30 PROCEDURE — 3078F DIAST BP <80 MM HG: CPT | Performed by: THORACIC SURGERY (CARDIOTHORACIC VASCULAR SURGERY)

## 2024-10-30 PROCEDURE — 3075F SYST BP GE 130 - 139MM HG: CPT | Performed by: THORACIC SURGERY (CARDIOTHORACIC VASCULAR SURGERY)

## 2024-10-30 PROCEDURE — 99205 OFFICE O/P NEW HI 60 MIN: CPT | Performed by: THORACIC SURGERY (CARDIOTHORACIC VASCULAR SURGERY)

## 2024-10-30 PROCEDURE — 1126F AMNT PAIN NOTED NONE PRSNT: CPT | Performed by: THORACIC SURGERY (CARDIOTHORACIC VASCULAR SURGERY)

## 2024-10-30 PROCEDURE — 99215 OFFICE O/P EST HI 40 MIN: CPT | Performed by: THORACIC SURGERY (CARDIOTHORACIC VASCULAR SURGERY)

## 2024-10-30 ASSESSMENT — PAIN SCALES - GENERAL: PAINLEVEL_OUTOF10: 0-NO PAIN

## 2024-10-31 ENCOUNTER — OFFICE VISIT (OUTPATIENT)
Dept: HEMATOLOGY/ONCOLOGY | Facility: HOSPITAL | Age: 74
End: 2024-10-31
Payer: MEDICARE

## 2024-10-31 ENCOUNTER — HOSPITAL ENCOUNTER (OUTPATIENT)
Dept: RADIOLOGY | Facility: HOSPITAL | Age: 74
Discharge: HOME | End: 2024-10-31
Payer: MEDICARE

## 2024-10-31 ENCOUNTER — NUTRITION (OUTPATIENT)
Dept: HEMATOLOGY/ONCOLOGY | Facility: HOSPITAL | Age: 74
End: 2024-10-31

## 2024-10-31 VITALS
WEIGHT: 292.99 LBS | HEIGHT: 73 IN | RESPIRATION RATE: 18 BRPM | SYSTOLIC BLOOD PRESSURE: 126 MMHG | DIASTOLIC BLOOD PRESSURE: 61 MMHG | TEMPERATURE: 97.2 F | HEART RATE: 56 BPM | OXYGEN SATURATION: 100 % | BODY MASS INDEX: 38.83 KG/M2

## 2024-10-31 DIAGNOSIS — K22.89 OTHER SPECIFIED DISEASE OF ESOPHAGUS: ICD-10-CM

## 2024-10-31 DIAGNOSIS — C15.5 MALIGNANT NEOPLASM OF LOWER THIRD OF ESOPHAGUS (MULTI): ICD-10-CM

## 2024-10-31 LAB — GLUCOSE BLD MANUAL STRIP-MCNC: 113 MG/DL (ref 74–99)

## 2024-10-31 PROCEDURE — 1036F TOBACCO NON-USER: CPT | Performed by: INTERNAL MEDICINE

## 2024-10-31 PROCEDURE — 78815 PET IMAGE W/CT SKULL-THIGH: CPT | Mod: PI

## 2024-10-31 PROCEDURE — 82947 ASSAY GLUCOSE BLOOD QUANT: CPT

## 2024-10-31 PROCEDURE — 99205 OFFICE O/P NEW HI 60 MIN: CPT | Performed by: INTERNAL MEDICINE

## 2024-10-31 PROCEDURE — 3430000001 HC RX 343 DIAGNOSTIC RADIOPHARMACEUTICALS: Performed by: THORACIC SURGERY (CARDIOTHORACIC VASCULAR SURGERY)

## 2024-10-31 PROCEDURE — A9552 F18 FDG: HCPCS | Performed by: THORACIC SURGERY (CARDIOTHORACIC VASCULAR SURGERY)

## 2024-10-31 PROCEDURE — 3008F BODY MASS INDEX DOCD: CPT | Performed by: INTERNAL MEDICINE

## 2024-10-31 PROCEDURE — 3078F DIAST BP <80 MM HG: CPT | Performed by: INTERNAL MEDICINE

## 2024-10-31 PROCEDURE — 3074F SYST BP LT 130 MM HG: CPT | Performed by: INTERNAL MEDICINE

## 2024-10-31 PROCEDURE — 99215 OFFICE O/P EST HI 40 MIN: CPT | Performed by: INTERNAL MEDICINE

## 2024-10-31 PROCEDURE — 1126F AMNT PAIN NOTED NONE PRSNT: CPT | Performed by: INTERNAL MEDICINE

## 2024-10-31 RX ORDER — EMPAGLIFLOZIN 25 MG/1
1 TABLET, FILM COATED ORAL
COMMUNITY
Start: 2024-07-08

## 2024-10-31 RX ORDER — METOPROLOL SUCCINATE 25 MG/1
25 TABLET, EXTENDED RELEASE ORAL 2 TIMES DAILY
COMMUNITY
Start: 2024-10-23

## 2024-10-31 RX ORDER — FERROUS SULFATE TAB 325 MG (65 MG ELEMENTAL FE) 325 (65 FE) MG
1 TAB ORAL
COMMUNITY
Start: 2024-09-18

## 2024-10-31 RX ORDER — SILDENAFIL 25 MG/1
25 TABLET, FILM COATED ORAL AS NEEDED
COMMUNITY

## 2024-10-31 RX ORDER — PANTOPRAZOLE SODIUM 40 MG/1
40 TABLET, DELAYED RELEASE ORAL 2 TIMES DAILY
COMMUNITY
Start: 2024-10-23

## 2024-10-31 RX ORDER — DIPHENHYDRAMINE HCL 25 MG
50 TABLET ORAL AS NEEDED
COMMUNITY

## 2024-10-31 RX ORDER — ASCORBIC ACID 500 MG
500 TABLET ORAL DAILY
COMMUNITY

## 2024-10-31 RX ORDER — TERAZOSIN 5 MG/1
5 CAPSULE ORAL NIGHTLY
COMMUNITY
Start: 2024-10-18

## 2024-10-31 RX ORDER — POTASSIUM CHLORIDE 1500 MG/1
1 TABLET, EXTENDED RELEASE ORAL DAILY
COMMUNITY
Start: 2024-07-11

## 2024-10-31 RX ORDER — MAGNESIUM 250 MG
250 TABLET ORAL
COMMUNITY
Start: 2024-10-23

## 2024-10-31 RX ORDER — OXYMETAZOLINE HCL 0.05 G/100ML
2 SPRAY NASAL
COMMUNITY
Start: 2024-10-23

## 2024-10-31 RX ORDER — ATORVASTATIN CALCIUM 80 MG/1
1 TABLET, FILM COATED ORAL DAILY
COMMUNITY
Start: 2024-08-21

## 2024-10-31 RX ORDER — ONDANSETRON 4 MG/1
4 TABLET, ORALLY DISINTEGRATING ORAL EVERY 6 HOURS PRN
COMMUNITY
Start: 2024-10-23

## 2024-10-31 RX ORDER — PRASUGREL 10 MG/1
1 TABLET, FILM COATED ORAL DAILY
COMMUNITY
Start: 2024-07-09

## 2024-10-31 RX ORDER — ALBUTEROL SULFATE 90 UG/1
2 INHALANT RESPIRATORY (INHALATION) EVERY 4 HOURS PRN
COMMUNITY

## 2024-10-31 RX ORDER — FLUDEOXYGLUCOSE F 18 200 MCI/ML
9.5 INJECTION, SOLUTION INTRAVENOUS
Status: COMPLETED | OUTPATIENT
Start: 2024-10-31 | End: 2024-10-31

## 2024-10-31 SDOH — HEALTH STABILITY: PHYSICAL HEALTH: ON AVERAGE, HOW MANY MINUTES DO YOU ENGAGE IN EXERCISE AT THIS LEVEL?: 10 MIN

## 2024-10-31 SDOH — HEALTH STABILITY: PHYSICAL HEALTH: ON AVERAGE, HOW MANY DAYS PER WEEK DO YOU ENGAGE IN MODERATE TO STRENUOUS EXERCISE (LIKE A BRISK WALK)?: 7 DAYS

## 2024-10-31 SDOH — ECONOMIC STABILITY: INCOME INSECURITY: IN THE LAST 12 MONTHS, WAS THERE A TIME WHEN YOU WERE NOT ABLE TO PAY THE MORTGAGE OR RENT ON TIME?: NO

## 2024-10-31 SDOH — ECONOMIC STABILITY: FOOD INSECURITY: WITHIN THE PAST 12 MONTHS, YOU WORRIED THAT YOUR FOOD WOULD RUN OUT BEFORE YOU GOT MONEY TO BUY MORE.: NEVER TRUE

## 2024-10-31 SDOH — ECONOMIC STABILITY: FOOD INSECURITY: WITHIN THE PAST 12 MONTHS, THE FOOD YOU BOUGHT JUST DIDN'T LAST AND YOU DIDN'T HAVE MONEY TO GET MORE.: NEVER TRUE

## 2024-10-31 SDOH — ECONOMIC STABILITY: TRANSPORTATION INSECURITY
IN THE PAST 12 MONTHS, HAS THE LACK OF TRANSPORTATION KEPT YOU FROM MEDICAL APPOINTMENTS OR FROM GETTING MEDICATIONS?: NO

## 2024-10-31 SDOH — ECONOMIC STABILITY: TRANSPORTATION INSECURITY
IN THE PAST 12 MONTHS, HAS LACK OF TRANSPORTATION KEPT YOU FROM MEETINGS, WORK, OR FROM GETTING THINGS NEEDED FOR DAILY LIVING?: NO

## 2024-10-31 ASSESSMENT — PAIN SCALES - GENERAL: PAINLEVEL_OUTOF10: 0-NO PAIN

## 2024-10-31 ASSESSMENT — SOCIAL DETERMINANTS OF HEALTH (SDOH)
WITHIN THE LAST YEAR, HAVE YOU BEEN KICKED, HIT, SLAPPED, OR OTHERWISE PHYSICALLY HURT BY YOUR PARTNER OR EX-PARTNER?: NO
WITHIN THE LAST YEAR, HAVE TO BEEN RAPED OR FORCED TO HAVE ANY KIND OF SEXUAL ACTIVITY BY YOUR PARTNER OR EX-PARTNER?: NO
IN THE PAST 12 MONTHS, HAS THE ELECTRIC, GAS, OIL, OR WATER COMPANY THREATENED TO SHUT OFF SERVICE IN YOUR HOME?: NO
HOW HARD IS IT FOR YOU TO PAY FOR THE VERY BASICS LIKE FOOD, HOUSING, MEDICAL CARE, AND HEATING?: NOT HARD AT ALL
WITHIN THE LAST YEAR, HAVE YOU BEEN HUMILIATED OR EMOTIONALLY ABUSED IN OTHER WAYS BY YOUR PARTNER OR EX-PARTNER?: NO
WITHIN THE LAST YEAR, HAVE YOU BEEN AFRAID OF YOUR PARTNER OR EX-PARTNER?: NO

## 2024-10-31 ASSESSMENT — LIFESTYLE VARIABLES
SKIP TO QUESTIONS 9-10: 1
AUDIT-C TOTAL SCORE: 0
HOW OFTEN DO YOU HAVE SIX OR MORE DRINKS ON ONE OCCASION: NEVER
HOW OFTEN DO YOU HAVE A DRINK CONTAINING ALCOHOL: NEVER
HOW MANY STANDARD DRINKS CONTAINING ALCOHOL DO YOU HAVE ON A TYPICAL DAY: PATIENT DOES NOT DRINK

## 2024-10-31 ASSESSMENT — PATIENT HEALTH QUESTIONNAIRE - PHQ9
1. LITTLE INTEREST OR PLEASURE IN DOING THINGS: NOT AT ALL
2. FEELING DOWN, DEPRESSED OR HOPELESS: NOT AT ALL
SUM OF ALL RESPONSES TO PHQ9 QUESTIONS 1 AND 2: 0

## 2024-10-31 NOTE — PROGRESS NOTES
"NUTRITION Assessment NOTE    Nutrition Assessment     Reason for Visit:  Abhijit Ackerman is a 74 y.o. male who presents for nutrtion assessment and education    He is meeting with medical oncology today  I am told that he is seeking treatment closer to home and will most likely be concurrent RT with chemotherapy      Ensoscopy 10-  Saw Dr. Balderas 10-- is most likely not a surgial candidate     Pt is diagnosed with esophageal cancer   Pmhx is noted and includes:  morbid obesity, DM, malnutrtion     Noted labs at [facility]- 10/17/2024 glu 139, BUN 24    He is here with his spouse and daughter  Pt is in a wheel chair    No results found for: \"GLUCOSE\", \"NA\", \"K\", \"CL\", \"CO2\", \"ANIONGAP\", \"BUN\", \"CREATININE\", \"EGFR\", \"CALCIUM\", \"ALBUMIN\", \"ALKPHOS\", \"PROT\", \"AST\", \"BILITOT\", \"ALT\"  No results found for: \"VITD25\"    Anthropometrics:     .4 cm-   IBW: 80.9 kg  164.4 % IBW    Adj BW:  93.9    Per records weight on 10- was 141.5 kg  9-1-2024 wt 315#- this is lower for him     Wt Readings from Last 10 Encounters:   10/31/24 133 kg (292 lb 15.9 oz)   08/25/20 (!) 145 kg (320 lb)        Food And Nutrient Intake:      In wheel chair is weak     DM- jardiance    Po intake - is poor   Hydration :  water can or cannot be a probelsm- taking sips- 2 to 3-  18 ounce glasses per day  Amalia and peach tea helps- kristen calms stomach  Heartburn is an ongoing issue - increased protonix recently to twice per day       September had issues with chest and stomach pain   3rd week of September had to change consistency of diet     Current intake :   Oikos triple zero- not taking all takes a half at a time   Cream of wheat  No oatmeal- not tolerated   1/2 of SF rice pudding  Pureed diet is too thick or still aggravating  Baby food carrots and beans- tolerated      Feeding tube is not discussed    Ensure clear goes goes down well   Has tried Premier protein - volume is an issue     Allergic to berries                    "                                          Nutrition Focused Physical Exam Findings:  defer: time and care will not be received here                         Energy Needs     Dosing weight: 80.9 to 93.9 kg  Calories per day: 2425 to 2820  determined by 30 kcal/kg  Protein (g) per day: 97 to 115 determined by 1.2 g/kg  Estimated fluid needs: 2425 + determined by 1 kcal/mL       Nutrition Diagnosis        Nutrition Diagnosis  Patient has Nutrition Diagnosis: Yes  Diagnosis Status (1): New  Nutrition Diagnosis 1: Inadequate energy intake  Related to (1): diagnosis and symptoms  As Evidenced by (1): weight and diet history    Nutrition Interventions/Recommendations   Nutrition Prescription  Individualized Nutrition Prescription Provided for : continue with liquid diet- asked for focus to be on ONS    Food and Nutrition Delivery  Food and Nutrition Delivery  Meals & Snacks: Texture-Modified Diet, Energy-modified diet, Carbohydrate-modified diet  Goals: With current intake feel some of DM restictions need to be eliminated- this was discussed- provided david of Boost VHC, ENsure Plus, Ensure Complete, [employer] 1.4,  OWYN, Glucerna, Glucerna 1.5 (vanilla) Ensure Plat based - to assess tolerance, impact on BS, ability to consume needed volume to approximate estimated needs  Enteral Nutrition: Other, specify:  Total Nutrition Provided: Discussed potential need for a feeding tube that there might be a need for this based on weight loss, po intake and ability to continue po intake while undergoing concurrent treatment - they are open to what team at [facility] recommends - pt could receive g-tube since he is not a surgical candidate  Medical Food Supplement: Commercial beverage  Goals: noted above - discussed in depth what was provided and why    Nutrition Education  Nutrition Education  Nutrition Education Content: Content related nutrition education  Goals: calorie and protein needs discussed as well as hydration- encouraged  having full liquid options with him at all times to sip on    Coordination of Care  Coordination of Nutrition Care by a Nutrition Professional  Collaboration and referral of nutrition care: Collaboration by nutrition professional with other providers  Goals: med onc team    There are no Patient Instructions on file for this visit.    Nutrition Monitoring and Evaluation        Pt will not be returning to [facility] for care t this time- there is not follow up needed

## 2024-11-07 ENCOUNTER — APPOINTMENT (OUTPATIENT)
Dept: HEMATOLOGY/ONCOLOGY | Facility: HOSPITAL | Age: 74
End: 2024-11-07
Payer: MEDICARE

## 2024-11-14 ENCOUNTER — TELEPHONE (OUTPATIENT)
Dept: HEMATOLOGY/ONCOLOGY | Facility: HOSPITAL | Age: 74
End: 2024-11-14

## 2024-11-21 ENCOUNTER — TELEPHONE (OUTPATIENT)
Dept: HEMATOLOGY/ONCOLOGY | Facility: HOSPITAL | Age: 74
End: 2024-11-21
Payer: MEDICARE

## 2024-11-21 NOTE — TELEPHONE ENCOUNTER
Received email advising to have patient contact [facility] Records at [phone] and ask for Codi. Called and spoke with patient's spouse to provide this information.

## 2025-04-04 ENCOUNTER — HOSPITAL ENCOUNTER (OUTPATIENT)
Age: 75
Discharge: HOME | End: 2025-04-04
Payer: MEDICARE

## 2025-04-04 ENCOUNTER — HOSPITAL ENCOUNTER (OUTPATIENT)
Dept: INTERVENTIONAL RADIOLOGY/VASCULAR | Age: 75
Discharge: HOME | End: 2025-04-04
Payer: MEDICARE

## 2025-04-04 VITALS
OXYGEN SATURATION: 93 % | DIASTOLIC BLOOD PRESSURE: 77 MMHG | SYSTOLIC BLOOD PRESSURE: 122 MMHG | HEART RATE: 98 BPM | RESPIRATION RATE: 18 BRPM

## 2025-04-04 DIAGNOSIS — R18.8 OTHER ASCITES: ICD-10-CM

## 2025-04-04 LAB
INR PPP: 1.2
PROTHROMBIN TIME: 12.7 SEC (ref 9.3–12.4)

## 2025-04-04 PROCEDURE — 85610 PROTHROMBIN TIME: CPT

## 2025-04-04 PROCEDURE — C1729 CATH, DRAINAGE: HCPCS

## 2025-04-04 PROCEDURE — 49083 ABD PARACENTESIS W/IMAGING: CPT

## 2025-04-04 NOTE — PROGRESS NOTES
Patient here for ultrasound guided paracentesis.      Instructions given and questions answered. Consent signed.      Abdomen scanned and marked under the guidance of procedural Radiologist.     1331  start procedure VS , /77, R 20, pulse ox 96% on room air.     1402  end procedure VS HR 94, /85, R 16, pulse ox 94% on room air.     7500  cc drained of clear blood tinged colored ascites fluid.      Dry sterile dressing of folded 4 x 4 gauze and tegaderm to RLQ abdomen.     Ok for patient to be discharged home

## 2025-04-10 ENCOUNTER — APPOINTMENT (OUTPATIENT)
Dept: GENERAL RADIOLOGY | Age: 75
DRG: 375 | End: 2025-04-10
Payer: MEDICARE

## 2025-04-10 ENCOUNTER — APPOINTMENT (OUTPATIENT)
Dept: CT IMAGING | Age: 75
DRG: 375 | End: 2025-04-10
Payer: MEDICARE

## 2025-04-10 ENCOUNTER — HOSPITAL ENCOUNTER (INPATIENT)
Age: 75
LOS: 3 days | Discharge: HOME HEALTH | DRG: 375 | End: 2025-04-13
Attending: EMERGENCY MEDICINE | Admitting: INTERNAL MEDICINE
Payer: MEDICARE

## 2025-04-10 DIAGNOSIS — R18.8 OTHER ASCITES: ICD-10-CM

## 2025-04-10 DIAGNOSIS — R06.00 DYSPNEA, UNSPECIFIED TYPE: ICD-10-CM

## 2025-04-10 DIAGNOSIS — J90 PLEURAL EFFUSION: Primary | ICD-10-CM

## 2025-04-10 PROBLEM — C15.9 ESOPHAGEAL CANCER, STAGE IV (HCC): Status: ACTIVE | Noted: 2025-04-10

## 2025-04-10 LAB
ABO + RH BLD: NORMAL
ALBUMIN SERPL-MCNC: 2.7 G/DL (ref 3.5–5.2)
ALP SERPL-CCNC: 64 U/L (ref 40–129)
ALT SERPL-CCNC: 10 U/L (ref 0–40)
ANION GAP SERPL CALCULATED.3IONS-SCNC: 12 MMOL/L (ref 7–16)
ARM BAND NUMBER: NORMAL
AST SERPL-CCNC: 12 U/L (ref 0–39)
BASOPHILS # BLD: 0.02 K/UL (ref 0–0.2)
BASOPHILS NFR BLD: 0 % (ref 0–2)
BILIRUB DIRECT SERPL-MCNC: <0.2 MG/DL (ref 0–0.3)
BILIRUB INDIRECT SERPL-MCNC: ABNORMAL MG/DL (ref 0–1)
BILIRUB SERPL-MCNC: 0.3 MG/DL (ref 0–1.2)
BLOOD BANK SAMPLE EXPIRATION: NORMAL
BLOOD GROUP ANTIBODIES SERPL: NEGATIVE
BNP SERPL-MCNC: 150 PG/ML (ref 0–450)
BUN SERPL-MCNC: 15 MG/DL (ref 6–23)
CALCIUM SERPL-MCNC: 8.2 MG/DL (ref 8.6–10.2)
CHLORIDE SERPL-SCNC: 100 MMOL/L (ref 98–107)
CO2 SERPL-SCNC: 21 MMOL/L (ref 22–29)
CREAT SERPL-MCNC: 1.1 MG/DL (ref 0.7–1.2)
EOSINOPHIL # BLD: 0.03 K/UL (ref 0.05–0.5)
EOSINOPHILS RELATIVE PERCENT: 1 % (ref 0–6)
ERYTHROCYTE [DISTWIDTH] IN BLOOD BY AUTOMATED COUNT: 15.4 % (ref 11.5–15)
GFR, ESTIMATED: 71 ML/MIN/1.73M2
GLUCOSE SERPL-MCNC: 119 MG/DL (ref 74–99)
HCT VFR BLD AUTO: 32.7 % (ref 37–54)
HGB BLD-MCNC: 10.9 G/DL (ref 12.5–16.5)
IMM GRANULOCYTES # BLD AUTO: 0.03 K/UL (ref 0–0.58)
IMM GRANULOCYTES NFR BLD: 1 % (ref 0–5)
INR PPP: 1.2
LACTATE BLDV-SCNC: 1.2 MMOL/L (ref 0.5–1.9)
LIPASE SERPL-CCNC: 9 U/L (ref 13–60)
LYMPHOCYTES NFR BLD: 0.22 K/UL (ref 1.5–4)
LYMPHOCYTES RELATIVE PERCENT: 5 % (ref 20–42)
MAGNESIUM SERPL-MCNC: 1.8 MG/DL (ref 1.6–2.6)
MCH RBC QN AUTO: 31.8 PG (ref 26–35)
MCHC RBC AUTO-ENTMCNC: 33.3 G/DL (ref 32–34.5)
MCV RBC AUTO: 95.3 FL (ref 80–99.9)
MONOCYTES NFR BLD: 0.67 K/UL (ref 0.1–0.95)
MONOCYTES NFR BLD: 15 % (ref 2–12)
NEUTROPHILS NFR BLD: 79 % (ref 43–80)
NEUTS SEG NFR BLD: 3.65 K/UL (ref 1.8–7.3)
NON-GYN CYTOLOGY REPORT: NORMAL
PARTIAL THROMBOPLASTIN TIME: 26.6 SEC (ref 24.5–35.1)
PLATELET # BLD AUTO: 106 K/UL (ref 130–450)
PMV BLD AUTO: 9.1 FL (ref 7–12)
POTASSIUM SERPL-SCNC: 4.1 MMOL/L (ref 3.5–5)
PROT SERPL-MCNC: 4.8 G/DL (ref 6.4–8.3)
PROTHROMBIN TIME: 13.1 SEC (ref 9.3–12.4)
RBC # BLD AUTO: 3.43 M/UL (ref 3.8–5.8)
RBC # BLD: ABNORMAL 10*6/UL
SODIUM SERPL-SCNC: 133 MMOL/L (ref 132–146)
TROPONIN I SERPL HS-MCNC: 25 NG/L (ref 0–11)
WBC OTHER # BLD: 4.6 K/UL (ref 4.5–11.5)

## 2025-04-10 PROCEDURE — 83605 ASSAY OF LACTIC ACID: CPT

## 2025-04-10 PROCEDURE — 85025 COMPLETE CBC W/AUTO DIFF WBC: CPT

## 2025-04-10 PROCEDURE — 83690 ASSAY OF LIPASE: CPT

## 2025-04-10 PROCEDURE — 80053 COMPREHEN METABOLIC PANEL: CPT

## 2025-04-10 PROCEDURE — 6360000004 HC RX CONTRAST MEDICATION: Performed by: RADIOLOGY

## 2025-04-10 PROCEDURE — 2700000000 HC OXYGEN THERAPY PER DAY

## 2025-04-10 PROCEDURE — 85610 PROTHROMBIN TIME: CPT

## 2025-04-10 PROCEDURE — 82248 BILIRUBIN DIRECT: CPT

## 2025-04-10 PROCEDURE — 83735 ASSAY OF MAGNESIUM: CPT

## 2025-04-10 PROCEDURE — 84484 ASSAY OF TROPONIN QUANT: CPT

## 2025-04-10 PROCEDURE — 6370000000 HC RX 637 (ALT 250 FOR IP): Performed by: INTERNAL MEDICINE

## 2025-04-10 PROCEDURE — 83880 ASSAY OF NATRIURETIC PEPTIDE: CPT

## 2025-04-10 PROCEDURE — 71045 X-RAY EXAM CHEST 1 VIEW: CPT

## 2025-04-10 PROCEDURE — 2060000000 HC ICU INTERMEDIATE R&B

## 2025-04-10 PROCEDURE — 74177 CT ABD & PELVIS W/CONTRAST: CPT

## 2025-04-10 PROCEDURE — 86850 RBC ANTIBODY SCREEN: CPT

## 2025-04-10 PROCEDURE — 86901 BLOOD TYPING SEROLOGIC RH(D): CPT

## 2025-04-10 PROCEDURE — 86900 BLOOD TYPING SEROLOGIC ABO: CPT

## 2025-04-10 PROCEDURE — 71275 CT ANGIOGRAPHY CHEST: CPT

## 2025-04-10 PROCEDURE — 85730 THROMBOPLASTIN TIME PARTIAL: CPT

## 2025-04-10 PROCEDURE — 99285 EMERGENCY DEPT VISIT HI MDM: CPT

## 2025-04-10 PROCEDURE — 6360000002 HC RX W HCPCS: Performed by: INTERNAL MEDICINE

## 2025-04-10 PROCEDURE — 93005 ELECTROCARDIOGRAM TRACING: CPT | Performed by: EMERGENCY MEDICINE

## 2025-04-10 PROCEDURE — 87040 BLOOD CULTURE FOR BACTERIA: CPT

## 2025-04-10 RX ORDER — SODIUM CHLORIDE 0.9 % (FLUSH) 0.9 %
5-40 SYRINGE (ML) INJECTION PRN
Status: DISCONTINUED | OUTPATIENT
Start: 2025-04-10 | End: 2025-04-13 | Stop reason: HOSPADM

## 2025-04-10 RX ORDER — METOPROLOL SUCCINATE 50 MG/1
50 TABLET, EXTENDED RELEASE ORAL 2 TIMES DAILY
Status: DISCONTINUED | OUTPATIENT
Start: 2025-04-10 | End: 2025-04-13 | Stop reason: HOSPADM

## 2025-04-10 RX ORDER — FAMOTIDINE 40 MG/1
40 TABLET, FILM COATED ORAL DAILY PRN
Status: ON HOLD | COMMUNITY
End: 2025-04-11

## 2025-04-10 RX ORDER — NITROGLYCERIN 0.4 MG/1
0.4 TABLET SUBLINGUAL EVERY 5 MIN PRN
Status: DISCONTINUED | OUTPATIENT
Start: 2025-04-10 | End: 2025-04-13 | Stop reason: HOSPADM

## 2025-04-10 RX ORDER — FUROSEMIDE 20 MG/1
40 TABLET ORAL DAILY
Status: ON HOLD | COMMUNITY
Start: 2025-04-09 | End: 2025-04-11

## 2025-04-10 RX ORDER — POTASSIUM CHLORIDE 7.45 MG/ML
10 INJECTION INTRAVENOUS PRN
Status: DISCONTINUED | OUTPATIENT
Start: 2025-04-10 | End: 2025-04-13 | Stop reason: HOSPADM

## 2025-04-10 RX ORDER — IOPAMIDOL 755 MG/ML
75 INJECTION, SOLUTION INTRAVASCULAR
Status: COMPLETED | OUTPATIENT
Start: 2025-04-10 | End: 2025-04-10

## 2025-04-10 RX ORDER — MAGNESIUM OXIDE 400 MG/1
400 TABLET ORAL DAILY
Status: DISCONTINUED | OUTPATIENT
Start: 2025-04-11 | End: 2025-04-13 | Stop reason: HOSPADM

## 2025-04-10 RX ORDER — MAGNESIUM SULFATE IN WATER 40 MG/ML
2000 INJECTION, SOLUTION INTRAVENOUS PRN
Status: DISCONTINUED | OUTPATIENT
Start: 2025-04-10 | End: 2025-04-13 | Stop reason: HOSPADM

## 2025-04-10 RX ORDER — SODIUM CHLORIDE 9 MG/ML
INJECTION, SOLUTION INTRAVENOUS PRN
Status: DISCONTINUED | OUTPATIENT
Start: 2025-04-10 | End: 2025-04-13 | Stop reason: HOSPADM

## 2025-04-10 RX ORDER — LANOLIN ALCOHOL/MO/W.PET/CERES
400 CREAM (GRAM) TOPICAL DAILY
Status: DISCONTINUED | OUTPATIENT
Start: 2025-04-11 | End: 2025-04-10 | Stop reason: SDUPTHER

## 2025-04-10 RX ORDER — POTASSIUM CHLORIDE 1500 MG/1
40 TABLET, EXTENDED RELEASE ORAL PRN
Status: DISCONTINUED | OUTPATIENT
Start: 2025-04-10 | End: 2025-04-13 | Stop reason: HOSPADM

## 2025-04-10 RX ORDER — GLUCOSAM/CHONDRO/HERB 149/HYAL 750-100 MG
2 TABLET ORAL DAILY
Status: DISCONTINUED | OUTPATIENT
Start: 2025-04-11 | End: 2025-04-10 | Stop reason: RX

## 2025-04-10 RX ORDER — LISINOPRIL AND HYDROCHLOROTHIAZIDE 12.5; 2 MG/1; MG/1
2 TABLET ORAL DAILY
Status: DISCONTINUED | OUTPATIENT
Start: 2025-04-10 | End: 2025-04-11

## 2025-04-10 RX ORDER — AMLODIPINE BESYLATE 5 MG/1
5 TABLET ORAL DAILY
Status: DISCONTINUED | OUTPATIENT
Start: 2025-04-10 | End: 2025-04-13 | Stop reason: HOSPADM

## 2025-04-10 RX ORDER — ASCORBIC ACID 500 MG
500 TABLET ORAL DAILY
Status: DISCONTINUED | OUTPATIENT
Start: 2025-04-11 | End: 2025-04-13 | Stop reason: HOSPADM

## 2025-04-10 RX ORDER — SUCRALFATE 1 G/1
1 TABLET ORAL
Status: DISCONTINUED | OUTPATIENT
Start: 2025-04-10 | End: 2025-04-11

## 2025-04-10 RX ORDER — PANTOPRAZOLE SODIUM 40 MG/1
40 TABLET, DELAYED RELEASE ORAL 2 TIMES DAILY
COMMUNITY

## 2025-04-10 RX ORDER — ONDANSETRON 2 MG/ML
4 INJECTION INTRAMUSCULAR; INTRAVENOUS EVERY 6 HOURS PRN
Status: DISCONTINUED | OUTPATIENT
Start: 2025-04-10 | End: 2025-04-13 | Stop reason: HOSPADM

## 2025-04-10 RX ORDER — ACETAMINOPHEN 325 MG/1
650 TABLET ORAL EVERY 6 HOURS PRN
Status: DISCONTINUED | OUTPATIENT
Start: 2025-04-10 | End: 2025-04-13 | Stop reason: HOSPADM

## 2025-04-10 RX ORDER — OXYMETAZOLINE HYDROCHLORIDE 0.05 G/100ML
2 SPRAY NASAL NIGHTLY
Status: DISCONTINUED | OUTPATIENT
Start: 2025-04-10 | End: 2025-04-13 | Stop reason: HOSPADM

## 2025-04-10 RX ORDER — ACETAMINOPHEN 650 MG/1
650 SUPPOSITORY RECTAL EVERY 6 HOURS PRN
Status: DISCONTINUED | OUTPATIENT
Start: 2025-04-10 | End: 2025-04-13 | Stop reason: HOSPADM

## 2025-04-10 RX ORDER — POTASSIUM CHLORIDE 1500 MG/1
20 TABLET, EXTENDED RELEASE ORAL DAILY
Status: DISCONTINUED | OUTPATIENT
Start: 2025-04-11 | End: 2025-04-11

## 2025-04-10 RX ORDER — FUROSEMIDE 40 MG/1
40 TABLET ORAL DAILY
Status: DISCONTINUED | OUTPATIENT
Start: 2025-04-11 | End: 2025-04-11

## 2025-04-10 RX ORDER — FAMOTIDINE 20 MG/1
40 TABLET, FILM COATED ORAL DAILY PRN
Status: DISCONTINUED | OUTPATIENT
Start: 2025-04-10 | End: 2025-04-13 | Stop reason: HOSPADM

## 2025-04-10 RX ORDER — ASPIRIN 81 MG/1
81 TABLET ORAL DAILY
Status: DISCONTINUED | OUTPATIENT
Start: 2025-04-10 | End: 2025-04-13 | Stop reason: HOSPADM

## 2025-04-10 RX ORDER — M-VIT,TX,IRON,MINS/CALC/FOLIC 27MG-0.4MG
1 TABLET ORAL DAILY
Status: DISCONTINUED | OUTPATIENT
Start: 2025-04-11 | End: 2025-04-13 | Stop reason: HOSPADM

## 2025-04-10 RX ORDER — PANTOPRAZOLE SODIUM 40 MG/1
40 TABLET, DELAYED RELEASE ORAL 2 TIMES DAILY
Status: DISCONTINUED | OUTPATIENT
Start: 2025-04-10 | End: 2025-04-13 | Stop reason: HOSPADM

## 2025-04-10 RX ORDER — POLYETHYLENE GLYCOL 3350 17 G/17G
17 POWDER, FOR SOLUTION ORAL DAILY PRN
Status: DISCONTINUED | OUTPATIENT
Start: 2025-04-10 | End: 2025-04-13 | Stop reason: HOSPADM

## 2025-04-10 RX ORDER — SODIUM CHLORIDE 0.9 % (FLUSH) 0.9 %
5-40 SYRINGE (ML) INJECTION EVERY 12 HOURS SCHEDULED
Status: DISCONTINUED | OUTPATIENT
Start: 2025-04-10 | End: 2025-04-13 | Stop reason: HOSPADM

## 2025-04-10 RX ORDER — ATORVASTATIN CALCIUM 40 MG/1
80 TABLET, FILM COATED ORAL NIGHTLY
Status: DISCONTINUED | OUTPATIENT
Start: 2025-04-10 | End: 2025-04-13 | Stop reason: HOSPADM

## 2025-04-10 RX ORDER — FUROSEMIDE 20 MG/1
20 TABLET ORAL DAILY
Status: ON HOLD | COMMUNITY
Start: 2025-04-14 | End: 2025-04-11

## 2025-04-10 RX ORDER — DIPHENHYDRAMINE HCL 25 MG
50 TABLET ORAL EVERY 6 HOURS PRN
Status: DISCONTINUED | OUTPATIENT
Start: 2025-04-10 | End: 2025-04-13 | Stop reason: HOSPADM

## 2025-04-10 RX ORDER — FUROSEMIDE 20 MG/1
20 TABLET ORAL DAILY
Status: DISCONTINUED | OUTPATIENT
Start: 2025-04-14 | End: 2025-04-11

## 2025-04-10 RX ORDER — ONDANSETRON 4 MG/1
4 TABLET, ORALLY DISINTEGRATING ORAL EVERY 8 HOURS PRN
Status: DISCONTINUED | OUTPATIENT
Start: 2025-04-10 | End: 2025-04-13 | Stop reason: HOSPADM

## 2025-04-10 RX ORDER — ENOXAPARIN SODIUM 100 MG/ML
30 INJECTION SUBCUTANEOUS 2 TIMES DAILY
Status: DISCONTINUED | OUTPATIENT
Start: 2025-04-10 | End: 2025-04-13 | Stop reason: HOSPADM

## 2025-04-10 RX ADMIN — Medication 3 MG: at 22:13

## 2025-04-10 RX ADMIN — METOPROLOL SUCCINATE 50 MG: 50 TABLET, EXTENDED RELEASE ORAL at 22:13

## 2025-04-10 RX ADMIN — ENOXAPARIN SODIUM 30 MG: 100 INJECTION SUBCUTANEOUS at 22:12

## 2025-04-10 RX ADMIN — AMLODIPINE BESYLATE 5 MG: 5 TABLET ORAL at 22:14

## 2025-04-10 RX ADMIN — ASPIRIN 81 MG: 81 TABLET, COATED ORAL at 22:13

## 2025-04-10 RX ADMIN — PANTOPRAZOLE SODIUM 40 MG: 40 TABLET, DELAYED RELEASE ORAL at 22:14

## 2025-04-10 RX ADMIN — ATORVASTATIN CALCIUM 80 MG: 40 TABLET, FILM COATED ORAL at 22:13

## 2025-04-10 RX ADMIN — IOPAMIDOL 75 ML: 755 INJECTION, SOLUTION INTRAVENOUS at 16:29

## 2025-04-10 ASSESSMENT — PAIN - FUNCTIONAL ASSESSMENT
PAIN_FUNCTIONAL_ASSESSMENT: 0-10
PAIN_FUNCTIONAL_ASSESSMENT: NONE - DENIES PAIN
PAIN_FUNCTIONAL_ASSESSMENT: 0-10
PAIN_FUNCTIONAL_ASSESSMENT: PREVENTS OR INTERFERES SOME ACTIVE ACTIVITIES AND ADLS
PAIN_FUNCTIONAL_ASSESSMENT: 0-10
PAIN_FUNCTIONAL_ASSESSMENT: PREVENTS OR INTERFERES WITH ALL ACTIVE AND SOME PASSIVE ACTIVITIES

## 2025-04-10 ASSESSMENT — PAIN DESCRIPTION - LOCATION
LOCATION: ABDOMEN
LOCATION: CHEST
LOCATION: ABDOMEN
LOCATION: ABDOMEN;CHEST
LOCATION: ABDOMEN

## 2025-04-10 ASSESSMENT — LIFESTYLE VARIABLES
HOW MANY STANDARD DRINKS CONTAINING ALCOHOL DO YOU HAVE ON A TYPICAL DAY: PATIENT DOES NOT DRINK
HOW OFTEN DO YOU HAVE A DRINK CONTAINING ALCOHOL: NEVER

## 2025-04-10 ASSESSMENT — PAIN SCALES - GENERAL
PAINLEVEL_OUTOF10: 4

## 2025-04-10 ASSESSMENT — PAIN DESCRIPTION - PAIN TYPE
TYPE: ACUTE PAIN

## 2025-04-10 ASSESSMENT — PAIN DESCRIPTION - DESCRIPTORS
DESCRIPTORS: THROBBING;ACHING
DESCRIPTORS: ACHING;TIGHTNESS;THROBBING
DESCRIPTORS: THROBBING;ACHING

## 2025-04-10 ASSESSMENT — PAIN DESCRIPTION - ORIENTATION
ORIENTATION: LOWER
ORIENTATION: MID;LOWER
ORIENTATION: LOWER

## 2025-04-10 ASSESSMENT — PAIN DESCRIPTION - FREQUENCY: FREQUENCY: CONTINUOUS

## 2025-04-10 NOTE — H&P
Date/Time    TSH 1.06 08/16/2024 05:06 AM       CTA PULMONARY W CONTRAST   Final Result   1. No evidence of pulmonary embolism.   2. Large right pleural effusion with adjacent atelectasis. Small left pleural   effusion with adjacent atelectasis.   3. Upper abdominal ascites. Possible omental/peritoneal masses.         CT ABDOMEN PELVIS W IV CONTRAST Additional Contrast? None   Final Result   1. Large volume 4 quadrant ascites.   2. Appearance of anterior omental/peritoneal masses worrisome for malignancy.   3. Thickening of the roof and left lateral wall of the urinary bladder.   Findings worrisome for malignancy.   4. Large right and small left pleural effusions with adjacent atelectasis.   5. Cirrhotic appearance of the liver.   6. Bilateral renal cortical atrophy.         XR CHEST 1 VIEW   Final Result   Significant elevation of the right hemidiaphragm with right lower lobe   atelectasis and infiltrate.             ASSESSMENT:  Severe shortness of breath and tachycardia  Right-sided pleural effusion most likely malignant  History of esophageal cancer being treated at the [facility]  Ascites status post paracentesis on 4/4/2025  Coronary artery disease status post stenting currently on Effient holding for the last 2 days  Hyperlipidemia  Hypertension  COPD  Obesity      PLAN:  Admit the patient  Consult IR  Continue to hold Effient  Continue rest of the home medication  DVT GI prophylaxis    Medical decision making is complex in this patient given the patient's complex medical history of esophageal cancer pleural effusion and ascites currently taking Effient for stent placement in the heart it is recommended to discontinue Effient at least 7 days before undergoing thoracentesis this recommendation is based on the need to minimize the risk of bleeding however given the patient's complex medical history including recent coronary stent placement it is crucial to balance the risk of bleeding with the risk of

## 2025-04-10 NOTE — ED PROVIDER NOTES
Chief complaint:  Short of breath      HPI history provided by the patient  Patient presents here complaining of increasing shortness of breath.  He has esophageal cancer follows with the [facility] has a history of ascites and pleural effusions and had a recent paracentesis taking off about 7 L but states he has had increasing shortness of breath for several days now, followed with the [facility] and found to have increasing pleural effusions and is being scheduled for repeat paracentesis and thoracentesis but states symptoms are worsening today and they have not scheduled yet so he is here for evaluation.  No actual chest pain or palpitations no lightheadedness or syncope.  No fevers.  No unilateral leg swelling or calf pain.  States he is already on blood thinners but held them yesterday and today in preparation for thoracentesis    Review of Systems   Constitutional:  Negative for chills, diaphoresis, fatigue and fever.   HENT:  Negative for congestion and sore throat.    Respiratory:  Positive for shortness of breath. Negative for cough, chest tightness and wheezing.    Cardiovascular:  Positive for leg swelling. Negative for chest pain and palpitations.   Gastrointestinal:  Positive for abdominal distention. Negative for abdominal pain, diarrhea, nausea and vomiting.   Genitourinary:  Negative for dysuria, flank pain, frequency, hematuria and urgency.   Musculoskeletal:  Negative for arthralgias, back pain, gait problem, joint swelling, myalgias, neck pain and neck stiffness.   Skin:  Negative for rash and wound.   Neurological:  Negative for dizziness, seizures, syncope, weakness, light-headedness, numbness and headaches.   All other systems reviewed and are negative.       Physical Exam  Vitals and nursing note reviewed.   Constitutional:       General: He is awake. He is not in acute distress.     Appearance: He is well-developed.   HENT:      Head: Normocephalic and atraumatic.   Eyes:      General: No

## 2025-04-11 ENCOUNTER — APPOINTMENT (OUTPATIENT)
Dept: INTERVENTIONAL RADIOLOGY/VASCULAR | Age: 75
DRG: 375 | End: 2025-04-11
Payer: MEDICARE

## 2025-04-11 ENCOUNTER — APPOINTMENT (OUTPATIENT)
Dept: GENERAL RADIOLOGY | Age: 75
DRG: 375 | End: 2025-04-11
Payer: MEDICARE

## 2025-04-11 LAB
ALBUMIN SERPL-MCNC: 2.7 G/DL (ref 3.5–5.2)
ALP SERPL-CCNC: 66 U/L (ref 40–129)
ALT SERPL-CCNC: 10 U/L (ref 0–40)
ANION GAP SERPL CALCULATED.3IONS-SCNC: 14 MMOL/L (ref 7–16)
AST SERPL-CCNC: 11 U/L (ref 0–39)
BASOPHILS # BLD: 0.02 K/UL (ref 0–0.2)
BASOPHILS NFR BLD: 0 % (ref 0–2)
BILIRUB DIRECT SERPL-MCNC: <0.2 MG/DL (ref 0–0.3)
BILIRUB INDIRECT SERPL-MCNC: ABNORMAL MG/DL (ref 0–1)
BILIRUB SERPL-MCNC: 0.3 MG/DL (ref 0–1.2)
BUN SERPL-MCNC: 20 MG/DL (ref 6–23)
CALCIUM SERPL-MCNC: 8.3 MG/DL (ref 8.6–10.2)
CHLORIDE SERPL-SCNC: 101 MMOL/L (ref 98–107)
CO2 SERPL-SCNC: 19 MMOL/L (ref 22–29)
CREAT SERPL-MCNC: 1.4 MG/DL (ref 0.7–1.2)
EKG ATRIAL RATE: 117 BPM
EKG P-R INTERVAL: 176 MS
EKG Q-T INTERVAL: 336 MS
EKG QRS DURATION: 120 MS
EKG QTC CALCULATION (BAZETT): 468 MS
EKG R AXIS: -78 DEGREES
EKG T AXIS: 34 DEGREES
EKG VENTRICULAR RATE: 117 BPM
EOSINOPHIL # BLD: 0.01 K/UL (ref 0.05–0.5)
EOSINOPHILS RELATIVE PERCENT: 0 % (ref 0–6)
ERYTHROCYTE [DISTWIDTH] IN BLOOD BY AUTOMATED COUNT: 15.5 % (ref 11.5–15)
GFR, ESTIMATED: 56 ML/MIN/1.73M2
GLUCOSE SERPL-MCNC: 148 MG/DL (ref 74–99)
HCT VFR BLD AUTO: 32.2 % (ref 37–54)
HGB BLD-MCNC: 10.7 G/DL (ref 12.5–16.5)
IMM GRANULOCYTES # BLD AUTO: 0.03 K/UL (ref 0–0.58)
IMM GRANULOCYTES NFR BLD: 1 % (ref 0–5)
LYMPHOCYTES NFR BLD: 0.19 K/UL (ref 1.5–4)
LYMPHOCYTES RELATIVE PERCENT: 4 % (ref 20–42)
MCH RBC QN AUTO: 31.6 PG (ref 26–35)
MCHC RBC AUTO-ENTMCNC: 33.2 G/DL (ref 32–34.5)
MCV RBC AUTO: 95 FL (ref 80–99.9)
MONOCYTES NFR BLD: 0.71 K/UL (ref 0.1–0.95)
MONOCYTES NFR BLD: 15 % (ref 2–12)
NEUTROPHILS NFR BLD: 79 % (ref 43–80)
NEUTS SEG NFR BLD: 3.66 K/UL (ref 1.8–7.3)
PLATELET # BLD AUTO: 119 K/UL (ref 130–450)
PMV BLD AUTO: 9.3 FL (ref 7–12)
POTASSIUM SERPL-SCNC: 4.2 MMOL/L (ref 3.5–5)
PROT SERPL-MCNC: 5.1 G/DL (ref 6.4–8.3)
RBC # BLD AUTO: 3.39 M/UL (ref 3.8–5.8)
RBC # BLD: ABNORMAL 10*6/UL
SODIUM SERPL-SCNC: 134 MMOL/L (ref 132–146)
WBC OTHER # BLD: 4.6 K/UL (ref 4.5–11.5)

## 2025-04-11 PROCEDURE — 71045 X-RAY EXAM CHEST 1 VIEW: CPT

## 2025-04-11 PROCEDURE — 80053 COMPREHEN METABOLIC PANEL: CPT

## 2025-04-11 PROCEDURE — C1729 CATH, DRAINAGE: HCPCS

## 2025-04-11 PROCEDURE — 36415 COLL VENOUS BLD VENIPUNCTURE: CPT

## 2025-04-11 PROCEDURE — 97161 PT EVAL LOW COMPLEX 20 MIN: CPT | Performed by: PHYSICAL THERAPIST

## 2025-04-11 PROCEDURE — 93010 ELECTROCARDIOGRAM REPORT: CPT | Performed by: INTERNAL MEDICINE

## 2025-04-11 PROCEDURE — 49083 ABD PARACENTESIS W/IMAGING: CPT

## 2025-04-11 PROCEDURE — 32555 ASPIRATE PLEURA W/ IMAGING: CPT

## 2025-04-11 PROCEDURE — 82248 BILIRUBIN DIRECT: CPT

## 2025-04-11 PROCEDURE — 2700000000 HC OXYGEN THERAPY PER DAY

## 2025-04-11 PROCEDURE — 97530 THERAPEUTIC ACTIVITIES: CPT | Performed by: PHYSICAL THERAPIST

## 2025-04-11 PROCEDURE — 0W993ZZ DRAINAGE OF RIGHT PLEURAL CAVITY, PERCUTANEOUS APPROACH: ICD-10-PCS | Performed by: INTERNAL MEDICINE

## 2025-04-11 PROCEDURE — 0W9G3ZZ DRAINAGE OF PERITONEAL CAVITY, PERCUTANEOUS APPROACH: ICD-10-PCS | Performed by: INTERNAL MEDICINE

## 2025-04-11 PROCEDURE — 85025 COMPLETE CBC W/AUTO DIFF WBC: CPT

## 2025-04-11 PROCEDURE — 2060000000 HC ICU INTERMEDIATE R&B

## 2025-04-11 PROCEDURE — 6360000002 HC RX W HCPCS: Performed by: INTERNAL MEDICINE

## 2025-04-11 PROCEDURE — 6370000000 HC RX 637 (ALT 250 FOR IP): Performed by: INTERNAL MEDICINE

## 2025-04-11 PROCEDURE — 2500000003 HC RX 250 WO HCPCS: Performed by: INTERNAL MEDICINE

## 2025-04-11 RX ORDER — ALBUTEROL SULFATE 0.83 MG/ML
2.5 SOLUTION RESPIRATORY (INHALATION) EVERY 6 HOURS PRN
Status: DISCONTINUED | OUTPATIENT
Start: 2025-04-11 | End: 2025-04-13 | Stop reason: HOSPADM

## 2025-04-11 RX ORDER — ALBUTEROL SULFATE 90 UG/1
2 INHALANT RESPIRATORY (INHALATION) EVERY 6 HOURS PRN
Status: DISCONTINUED | OUTPATIENT
Start: 2025-04-11 | End: 2025-04-11

## 2025-04-11 RX ORDER — TERAZOSIN 5 MG/1
5 CAPSULE ORAL NIGHTLY
Status: DISCONTINUED | OUTPATIENT
Start: 2025-04-11 | End: 2025-04-13 | Stop reason: HOSPADM

## 2025-04-11 RX ORDER — METOPROLOL TARTRATE 50 MG
50 TABLET ORAL ONCE
Status: CANCELLED | OUTPATIENT
Start: 2025-04-11

## 2025-04-11 RX ADMIN — SODIUM CHLORIDE, PRESERVATIVE FREE 10 ML: 5 INJECTION INTRAVENOUS at 09:34

## 2025-04-11 RX ADMIN — EMPAGLIFLOZIN 12.5 MG: 25 TABLET, FILM COATED ORAL at 16:50

## 2025-04-11 RX ADMIN — SUCRALFATE 1 G: 1 TABLET ORAL at 06:37

## 2025-04-11 RX ADMIN — METOPROLOL SUCCINATE 50 MG: 50 TABLET, EXTENDED RELEASE ORAL at 09:27

## 2025-04-11 RX ADMIN — METOPROLOL SUCCINATE 50 MG: 50 TABLET, EXTENDED RELEASE ORAL at 21:20

## 2025-04-11 RX ADMIN — MAGNESIUM OXIDE 400 MG: 400 TABLET ORAL at 09:27

## 2025-04-11 RX ADMIN — ENOXAPARIN SODIUM 30 MG: 100 INJECTION SUBCUTANEOUS at 21:20

## 2025-04-11 RX ADMIN — OXYCODONE HYDROCHLORIDE AND ACETAMINOPHEN 500 MG: 500 TABLET ORAL at 09:27

## 2025-04-11 RX ADMIN — PANTOPRAZOLE SODIUM 40 MG: 40 TABLET, DELAYED RELEASE ORAL at 09:28

## 2025-04-11 RX ADMIN — Medication 2 SPRAY: at 21:21

## 2025-04-11 RX ADMIN — SODIUM CHLORIDE, PRESERVATIVE FREE 10 ML: 5 INJECTION INTRAVENOUS at 21:22

## 2025-04-11 RX ADMIN — ACETAMINOPHEN 650 MG: 325 TABLET ORAL at 09:27

## 2025-04-11 RX ADMIN — Medication 3 MG: at 21:20

## 2025-04-11 RX ADMIN — Medication 1 TABLET: at 09:27

## 2025-04-11 RX ADMIN — ATORVASTATIN CALCIUM 80 MG: 40 TABLET, FILM COATED ORAL at 21:20

## 2025-04-11 RX ADMIN — TERAZOSIN 5 MG: 5 CAPSULE ORAL at 21:20

## 2025-04-11 RX ADMIN — PANTOPRAZOLE SODIUM 40 MG: 40 TABLET, DELAYED RELEASE ORAL at 21:20

## 2025-04-11 ASSESSMENT — PAIN DESCRIPTION - DESCRIPTORS
DESCRIPTORS: DISCOMFORT
DESCRIPTORS: DISCOMFORT;SORE

## 2025-04-11 ASSESSMENT — PAIN DESCRIPTION - PAIN TYPE: TYPE: ACUTE PAIN

## 2025-04-11 ASSESSMENT — PAIN DESCRIPTION - LOCATION
LOCATION: ABDOMEN
LOCATION: ABDOMEN

## 2025-04-11 ASSESSMENT — PAIN SCALES - GENERAL
PAINLEVEL_OUTOF10: 4
PAINLEVEL_OUTOF10: 4
PAINLEVEL_OUTOF10: 3
PAINLEVEL_OUTOF10: 2

## 2025-04-11 ASSESSMENT — PAIN DESCRIPTION - ORIENTATION: ORIENTATION: LOWER

## 2025-04-11 ASSESSMENT — PAIN - FUNCTIONAL ASSESSMENT: PAIN_FUNCTIONAL_ASSESSMENT: ACTIVITIES ARE NOT PREVENTED

## 2025-04-11 ASSESSMENT — PAIN SCALES - WONG BAKER: WONGBAKER_NUMERICALRESPONSE: NO HURT

## 2025-04-11 NOTE — CARE COORDINATION
Case Management Assessment  Initial Evaluation    Date/Time of Evaluation: 4/11/2025 4:07 PM  Assessment Completed by: LORI Forman    If patient is discharged prior to next notation, then this note serves as note for discharge by case management.    Patient Name: Herb Rand                   YOB: 1950  Diagnosis: Pleural effusion [J90]  Other ascites [R18.8]  Dyspnea, unspecified type [R06.00]  Esophageal cancer, stage IV (HCC) [C15.9]                   Date / Time: 4/10/2025  1:38 PM    Patient Admission Status: Inpatient   Readmission Risk (Low < 19, Mod (19-27), High > 27): Readmission Risk Score: 15.6    Current PCP: Gabe Goodwin MD  PCP verified by CM? Yes    Chart Reviewed: Yes      History Provided by: Patient  Patient Orientation: Alert and Oriented, Person, Place, Situation    Patient Cognition: Alert    Hospitalization in the last 30 days (Readmission):  No    If yes, Readmission Assessment in CM Navigator will be completed.    Advance Directives:      Code Status: Full Code   Patient's Primary Decision Maker is: Named in Scanned ACP Document    Primary Decision Maker: An Rand - Spouse - [phone]    Discharge Planning:    Patient lives with: Spouse/Significant Other Type of Home: Other (Comment) (condo)  Primary Care Giver: Self  Patient Support Systems include: Spouse/Significant Other, Family Members   Current Financial resources:    Current community resources:    Current services prior to admission: C-pap            Current DME:              Type of Home Care services:  None    ADLS  Prior functional level: Independent in ADLs/IADLs  Current functional level: Assistance with the following:, Shopping, Housework, Cooking (step dtrs can help some)    PT AM-PAC: 17 /24  OT AM-PAC:   /24    Family can provide assistance at DC: Yes  Would you like Case Management to discuss the discharge plan with any other family members/significant others, and if so, who?

## 2025-04-11 NOTE — ACP (ADVANCE CARE PLANNING)
Advance Care Planning   Healthcare Decision Maker:    Primary Decision Maker: RandAn - [facility] - [phone]    Click here to complete Healthcare Decision Makers including selection of the Healthcare Decision Maker Relationship (ie \"Primary\").  Today we documented Decision Maker(s) consistent with ACP documents on file.

## 2025-04-12 ENCOUNTER — APPOINTMENT (OUTPATIENT)
Dept: GENERAL RADIOLOGY | Age: 75
DRG: 375 | End: 2025-04-12
Payer: MEDICARE

## 2025-04-12 LAB
ANION GAP SERPL CALCULATED.3IONS-SCNC: 13 MMOL/L (ref 7–16)
ANION GAP SERPL CALCULATED.3IONS-SCNC: 13 MMOL/L (ref 7–16)
BACTERIA URNS QL MICRO: ABNORMAL
BILIRUB UR QL STRIP: ABNORMAL
BUN SERPL-MCNC: 25 MG/DL (ref 6–23)
BUN SERPL-MCNC: 26 MG/DL (ref 6–23)
CALCIUM SERPL-MCNC: 8.5 MG/DL (ref 8.6–10.2)
CALCIUM SERPL-MCNC: 8.7 MG/DL (ref 8.6–10.2)
CASTS #/AREA URNS LPF: ABNORMAL /LPF
CHLORIDE SERPL-SCNC: 95 MMOL/L (ref 98–107)
CHLORIDE SERPL-SCNC: 97 MMOL/L (ref 98–107)
CLARITY UR: CLEAR
CO2 SERPL-SCNC: 20 MMOL/L (ref 22–29)
CO2 SERPL-SCNC: 21 MMOL/L (ref 22–29)
COLOR UR: YELLOW
CREAT SERPL-MCNC: 1.4 MG/DL (ref 0.7–1.2)
CREAT SERPL-MCNC: 1.6 MG/DL (ref 0.7–1.2)
CREAT UR-MCNC: 346.6 MG/DL (ref 40–278)
CREAT UR-MCNC: 348.1 MG/DL (ref 40–278)
ERYTHROCYTE [DISTWIDTH] IN BLOOD BY AUTOMATED COUNT: 15.2 % (ref 11.5–15)
GFR, ESTIMATED: 47 ML/MIN/1.73M2
GFR, ESTIMATED: 52 ML/MIN/1.73M2
GLUCOSE SERPL-MCNC: 123 MG/DL (ref 74–99)
GLUCOSE SERPL-MCNC: 124 MG/DL (ref 74–99)
GLUCOSE UR STRIP-MCNC: 500 MG/DL
HCT VFR BLD AUTO: 28.6 % (ref 37–54)
HGB BLD-MCNC: 9.4 G/DL (ref 12.5–16.5)
HGB UR QL STRIP.AUTO: NEGATIVE
KETONES UR STRIP-MCNC: NEGATIVE MG/DL
LEUKOCYTE ESTERASE UR QL STRIP: NEGATIVE
MCH RBC QN AUTO: 31.6 PG (ref 26–35)
MCHC RBC AUTO-ENTMCNC: 32.9 G/DL (ref 32–34.5)
MCV RBC AUTO: 96.3 FL (ref 80–99.9)
MUCOUS THREADS URNS QL MICRO: PRESENT
NITRITE UR QL STRIP: POSITIVE
PH UR STRIP: 5.5 [PH] (ref 5–8)
PLATELET # BLD AUTO: 107 K/UL (ref 130–450)
PMV BLD AUTO: 8.7 FL (ref 7–12)
POTASSIUM SERPL-SCNC: 4 MMOL/L (ref 3.5–5)
POTASSIUM SERPL-SCNC: 4.1 MMOL/L (ref 3.5–5)
PROT UR STRIP-MCNC: 30 MG/DL
RBC # BLD AUTO: 2.97 M/UL (ref 3.8–5.8)
RBC #/AREA URNS HPF: ABNORMAL /HPF
SODIUM SERPL-SCNC: 128 MMOL/L (ref 132–146)
SODIUM SERPL-SCNC: 131 MMOL/L (ref 132–146)
SODIUM UR-SCNC: <20 MMOL/L
SP GR UR STRIP: >1.03 (ref 1–1.03)
TOTAL PROTEIN, URINE: 53 MG/DL (ref 0–12)
URINE TOTAL PROTEIN CREATININE RATIO: 0.15 (ref 0–0.2)
UROBILINOGEN UR STRIP-ACNC: 0.2 EU/DL (ref 0–1)
UUN UR-MCNC: 866 MG/DL (ref 800–1666)
WBC #/AREA URNS HPF: ABNORMAL /HPF
WBC OTHER # BLD: 3.9 K/UL (ref 4.5–11.5)

## 2025-04-12 PROCEDURE — 6360000002 HC RX W HCPCS: Performed by: INTERNAL MEDICINE

## 2025-04-12 PROCEDURE — 82570 ASSAY OF URINE CREATININE: CPT

## 2025-04-12 PROCEDURE — P9047 ALBUMIN (HUMAN), 25%, 50ML: HCPCS | Performed by: INTERNAL MEDICINE

## 2025-04-12 PROCEDURE — 84300 ASSAY OF URINE SODIUM: CPT

## 2025-04-12 PROCEDURE — 6370000000 HC RX 637 (ALT 250 FOR IP): Performed by: INTERNAL MEDICINE

## 2025-04-12 PROCEDURE — 80048 BASIC METABOLIC PNL TOTAL CA: CPT

## 2025-04-12 PROCEDURE — 71045 X-RAY EXAM CHEST 1 VIEW: CPT

## 2025-04-12 PROCEDURE — 36415 COLL VENOUS BLD VENIPUNCTURE: CPT

## 2025-04-12 PROCEDURE — 2700000000 HC OXYGEN THERAPY PER DAY

## 2025-04-12 PROCEDURE — 2060000000 HC ICU INTERMEDIATE R&B

## 2025-04-12 PROCEDURE — 84156 ASSAY OF PROTEIN URINE: CPT

## 2025-04-12 PROCEDURE — 81001 URINALYSIS AUTO W/SCOPE: CPT

## 2025-04-12 PROCEDURE — 84540 ASSAY OF URINE/UREA-N: CPT

## 2025-04-12 PROCEDURE — 2500000003 HC RX 250 WO HCPCS: Performed by: INTERNAL MEDICINE

## 2025-04-12 PROCEDURE — 85027 COMPLETE CBC AUTOMATED: CPT

## 2025-04-12 RX ORDER — ALBUMIN (HUMAN) 12.5 G/50ML
50 SOLUTION INTRAVENOUS ONCE
Status: COMPLETED | OUTPATIENT
Start: 2025-04-12 | End: 2025-04-12

## 2025-04-12 RX ORDER — FUROSEMIDE 10 MG/ML
20 INJECTION INTRAMUSCULAR; INTRAVENOUS 2 TIMES DAILY
Status: DISCONTINUED | OUTPATIENT
Start: 2025-04-13 | End: 2025-04-13 | Stop reason: HOSPADM

## 2025-04-12 RX ADMIN — ALBUMIN (HUMAN) 25 G: 25 SOLUTION INTRAVENOUS at 10:14

## 2025-04-12 RX ADMIN — Medication 3 MG: at 20:31

## 2025-04-12 RX ADMIN — PANTOPRAZOLE SODIUM 40 MG: 40 TABLET, DELAYED RELEASE ORAL at 20:31

## 2025-04-12 RX ADMIN — ASPIRIN 81 MG: 81 TABLET, COATED ORAL at 10:15

## 2025-04-12 RX ADMIN — MAGNESIUM OXIDE 400 MG: 400 TABLET ORAL at 10:15

## 2025-04-12 RX ADMIN — METOPROLOL SUCCINATE 50 MG: 50 TABLET, EXTENDED RELEASE ORAL at 20:31

## 2025-04-12 RX ADMIN — PANTOPRAZOLE SODIUM 40 MG: 40 TABLET, DELAYED RELEASE ORAL at 10:15

## 2025-04-12 RX ADMIN — METOPROLOL SUCCINATE 50 MG: 50 TABLET, EXTENDED RELEASE ORAL at 10:15

## 2025-04-12 RX ADMIN — TERAZOSIN 5 MG: 5 CAPSULE ORAL at 20:31

## 2025-04-12 RX ADMIN — ATORVASTATIN CALCIUM 80 MG: 40 TABLET, FILM COATED ORAL at 20:31

## 2025-04-12 RX ADMIN — OXYCODONE HYDROCHLORIDE AND ACETAMINOPHEN 500 MG: 500 TABLET ORAL at 10:16

## 2025-04-12 RX ADMIN — SODIUM CHLORIDE, PRESERVATIVE FREE 10 ML: 5 INJECTION INTRAVENOUS at 12:28

## 2025-04-12 RX ADMIN — Medication 1 TABLET: at 12:28

## 2025-04-12 RX ADMIN — WATER 1000 MG: 1 INJECTION INTRAMUSCULAR; INTRAVENOUS; SUBCUTANEOUS at 12:28

## 2025-04-12 RX ADMIN — ENOXAPARIN SODIUM 30 MG: 100 INJECTION SUBCUTANEOUS at 20:33

## 2025-04-12 RX ADMIN — ACETAMINOPHEN 650 MG: 325 TABLET ORAL at 19:31

## 2025-04-12 RX ADMIN — ACETAMINOPHEN 650 MG: 325 TABLET ORAL at 10:15

## 2025-04-12 RX ADMIN — Medication 2 SPRAY: at 21:22

## 2025-04-12 ASSESSMENT — PAIN DESCRIPTION - LOCATION
LOCATION: ABDOMEN;SHOULDER;NECK
LOCATION: ABDOMEN

## 2025-04-12 ASSESSMENT — PAIN SCALES - GENERAL
PAINLEVEL_OUTOF10: 5
PAINLEVEL_OUTOF10: 3
PAINLEVEL_OUTOF10: 5
PAINLEVEL_OUTOF10: 1
PAINLEVEL_OUTOF10: 3
PAINLEVEL_OUTOF10: 3

## 2025-04-12 ASSESSMENT — PAIN DESCRIPTION - DESCRIPTORS
DESCRIPTORS: ACHING;SHARP;SORE
DESCRIPTORS: CRAMPING;SORE

## 2025-04-12 ASSESSMENT — PAIN SCALES - WONG BAKER
WONGBAKER_NUMERICALRESPONSE: NO HURT
WONGBAKER_NUMERICALRESPONSE: NO HURT

## 2025-04-12 NOTE — CONSULTS
Nephrology Consult Note  Patient's Name: Herb Radn  10:04 AM  4/12/2025    Nephrologist: Peter Quintana MD (none previous)      Reason for Consult:  DAX, hypotension  PCP:  Gabe Goodwin MD  Requesting Physician: Zack Lucero MD    Chief Complaint:  worsening SOB    History Obtained From:  pt, chart    History of Present Illness:    Herb Rand is a 74 y.o. male with PMHx metastatic gastroesophageal adenocarcinoma, pMMR.  HER2 1+, PD-L1 90% started on palliative chemo with FOLFOX and nivolumab 3/11/2025 although third cycle delayed.  He has been undergoing complications including recurrent abdominal ascites had for therapeutic paracentesis April 4 with 7.5 L of bloody ascitic fluid removed.  He also has past medical history of hypertension and type 2 diabetes along with hyperlipidemia and obstructive sleep apnea.  Also with coronary artery disease status post PCI with 2 stents in RCA per the patient in the last year.    Patient began having worsening shortness of breath and leg swelling as well as increased abdominal distention.  He was advised by the [facility] (follows Dr. Sheppard) to present to the ED.    Initial workup in the emergency department included a CT angiogram of the chest as well as a CT of the abdomen and pelvis with IV contrast.  Abdominal imaging revealed large volume four-quadrant ascites, appearance of anterior omental/peritoneal masses worrisome for malignancy, thickening of the roof and left lateral wall of the urinary bladder worrisome for malignancy along with large right and small left pleural effusions along with cirrhotic liver and bilateral renal cortical atrophy.  Labs on 4/10 showed creatinine 1.1 mg/dL which is similar to baseline 0.7 to 1.0 mg/dL and creatinine brenden to 1.4 mg/dL yesterday which prompted a nephrology consultation.  Patient underwent paracentesis and thoracentesis yesterday.  5.8 L was removed by abdominal paracentesis and 1200 cc was removed

## 2025-04-13 VITALS
BODY MASS INDEX: 36.51 KG/M2 | OXYGEN SATURATION: 94 % | DIASTOLIC BLOOD PRESSURE: 99 MMHG | SYSTOLIC BLOOD PRESSURE: 116 MMHG | WEIGHT: 284.5 LBS | HEIGHT: 74 IN | HEART RATE: 96 BPM | RESPIRATION RATE: 27 BRPM | TEMPERATURE: 98.6 F

## 2025-04-13 LAB
ANION GAP SERPL CALCULATED.3IONS-SCNC: 12 MMOL/L (ref 7–16)
BASOPHILS # BLD: 0.08 K/UL (ref 0–0.2)
BASOPHILS NFR BLD: 2 % (ref 0–2)
BUN SERPL-MCNC: 25 MG/DL (ref 6–23)
CALCIUM SERPL-MCNC: 8.6 MG/DL (ref 8.6–10.2)
CHLORIDE SERPL-SCNC: 99 MMOL/L (ref 98–107)
CO2 SERPL-SCNC: 21 MMOL/L (ref 22–29)
CREAT SERPL-MCNC: 1.2 MG/DL (ref 0.7–1.2)
EOSINOPHIL # BLD: 0.04 K/UL (ref 0.05–0.5)
EOSINOPHILS RELATIVE PERCENT: 1 % (ref 0–6)
ERYTHROCYTE [DISTWIDTH] IN BLOOD BY AUTOMATED COUNT: 15.2 % (ref 11.5–15)
GFR, ESTIMATED: 66 ML/MIN/1.73M2
GLUCOSE SERPL-MCNC: 135 MG/DL (ref 74–99)
HCT VFR BLD AUTO: 32.5 % (ref 37–54)
HGB BLD-MCNC: 10.7 G/DL (ref 12.5–16.5)
LYMPHOCYTES NFR BLD: 0.2 K/UL (ref 1.5–4)
LYMPHOCYTES RELATIVE PERCENT: 5 % (ref 20–42)
MCH RBC QN AUTO: 31.5 PG (ref 26–35)
MCHC RBC AUTO-ENTMCNC: 32.9 G/DL (ref 32–34.5)
MCV RBC AUTO: 95.6 FL (ref 80–99.9)
METAMYELOCYTES ABSOLUTE COUNT: 0.08 K/UL (ref 0–0.12)
METAMYELOCYTES: 2 % (ref 0–1)
MONOCYTES NFR BLD: 0.24 K/UL (ref 0.1–0.95)
MONOCYTES NFR BLD: 6 % (ref 2–12)
NEUTROPHILS NFR BLD: 84 % (ref 43–80)
NEUTS SEG NFR BLD: 3.36 K/UL (ref 1.8–7.3)
NUCLEATED RED BLOOD CELLS: 1 PER 100 WBC
PHOSPHATE SERPL-MCNC: 3.7 MG/DL (ref 2.5–4.5)
PLATELET # BLD AUTO: 125 K/UL (ref 130–450)
PMV BLD AUTO: 9 FL (ref 7–12)
POTASSIUM SERPL-SCNC: 4.5 MMOL/L (ref 3.5–5)
RBC # BLD AUTO: 3.4 M/UL (ref 3.8–5.8)
SODIUM SERPL-SCNC: 132 MMOL/L (ref 132–146)
WBC OTHER # BLD: 4 K/UL (ref 4.5–11.5)

## 2025-04-13 PROCEDURE — 97530 THERAPEUTIC ACTIVITIES: CPT

## 2025-04-13 PROCEDURE — 36415 COLL VENOUS BLD VENIPUNCTURE: CPT

## 2025-04-13 PROCEDURE — 2500000003 HC RX 250 WO HCPCS: Performed by: INTERNAL MEDICINE

## 2025-04-13 PROCEDURE — 6360000002 HC RX W HCPCS: Performed by: INTERNAL MEDICINE

## 2025-04-13 PROCEDURE — 97110 THERAPEUTIC EXERCISES: CPT

## 2025-04-13 PROCEDURE — 6370000000 HC RX 637 (ALT 250 FOR IP): Performed by: INTERNAL MEDICINE

## 2025-04-13 PROCEDURE — 6360000002 HC RX W HCPCS: Performed by: STUDENT IN AN ORGANIZED HEALTH CARE EDUCATION/TRAINING PROGRAM

## 2025-04-13 PROCEDURE — 2700000000 HC OXYGEN THERAPY PER DAY

## 2025-04-13 PROCEDURE — 85025 COMPLETE CBC W/AUTO DIFF WBC: CPT

## 2025-04-13 PROCEDURE — 84100 ASSAY OF PHOSPHORUS: CPT

## 2025-04-13 PROCEDURE — 97116 GAIT TRAINING THERAPY: CPT

## 2025-04-13 PROCEDURE — 80048 BASIC METABOLIC PNL TOTAL CA: CPT

## 2025-04-13 RX ORDER — FUROSEMIDE 20 MG/1
20 TABLET ORAL DAILY
Qty: 60 TABLET | Refills: 3 | Status: SHIPPED | OUTPATIENT
Start: 2025-04-13

## 2025-04-13 RX ADMIN — WATER 1000 MG: 1 INJECTION INTRAMUSCULAR; INTRAVENOUS; SUBCUTANEOUS at 13:08

## 2025-04-13 RX ADMIN — ACETAMINOPHEN 650 MG: 325 TABLET ORAL at 08:27

## 2025-04-13 RX ADMIN — MAGNESIUM OXIDE 400 MG: 400 TABLET ORAL at 08:27

## 2025-04-13 RX ADMIN — Medication 1 TABLET: at 08:28

## 2025-04-13 RX ADMIN — OXYCODONE HYDROCHLORIDE AND ACETAMINOPHEN 500 MG: 500 TABLET ORAL at 08:28

## 2025-04-13 RX ADMIN — ENOXAPARIN SODIUM 30 MG: 100 INJECTION SUBCUTANEOUS at 08:28

## 2025-04-13 RX ADMIN — FUROSEMIDE 20 MG: 10 INJECTION, SOLUTION INTRAVENOUS at 08:28

## 2025-04-13 RX ADMIN — PANTOPRAZOLE SODIUM 40 MG: 40 TABLET, DELAYED RELEASE ORAL at 08:28

## 2025-04-13 RX ADMIN — SODIUM CHLORIDE, PRESERVATIVE FREE 10 ML: 5 INJECTION INTRAVENOUS at 08:28

## 2025-04-13 RX ADMIN — METOPROLOL SUCCINATE 50 MG: 50 TABLET, EXTENDED RELEASE ORAL at 08:28

## 2025-04-13 RX ADMIN — ASPIRIN 81 MG: 81 TABLET, COATED ORAL at 08:27

## 2025-04-13 ASSESSMENT — PAIN DESCRIPTION - ORIENTATION: ORIENTATION: LOWER

## 2025-04-13 ASSESSMENT — PAIN DESCRIPTION - DESCRIPTORS
DESCRIPTORS: ACHING;DISCOMFORT;SORE
DESCRIPTORS: DISCOMFORT;SORE

## 2025-04-13 ASSESSMENT — PAIN SCALES - GENERAL
PAINLEVEL_OUTOF10: 5
PAINLEVEL_OUTOF10: 2
PAINLEVEL_OUTOF10: 0
PAINLEVEL_OUTOF10: 0
PAINLEVEL_OUTOF10: 5

## 2025-04-13 ASSESSMENT — PAIN DESCRIPTION - LOCATION
LOCATION: BACK;HIP
LOCATION: ABDOMEN;HIP;BACK

## 2025-04-13 ASSESSMENT — PAIN SCALES - WONG BAKER: WONGBAKER_NUMERICALRESPONSE: NO HURT

## 2025-04-13 NOTE — PLAN OF CARE
Problem: Chronic Conditions and Co-morbidities  Goal: Patient's chronic conditions and co-morbidity symptoms are monitored and maintained or improved  4/11/2025 1014 by Dasia Arreola RN  Outcome: Progressing  Flowsheets (Taken 4/11/2025 1014)  Care Plan - Patient's Chronic Conditions and Co-Morbidity Symptoms are Monitored and Maintained or Improved:   Monitor and assess patient's chronic conditions and comorbid symptoms for stability, deterioration, or improvement   Collaborate with multidisciplinary team to address chronic and comorbid conditions and prevent exacerbation or deterioration   Update acute care plan with appropriate goals if chronic or comorbid symptoms are exacerbated and prevent overall improvement and discharge  4/11/2025 0437 by Annika Dumas RN  Outcome: Progressing     Problem: Discharge Planning  Goal: Discharge to home or other facility with appropriate resources  4/11/2025 1014 by Dasia Arreola RN  Outcome: Progressing  Flowsheets (Taken 4/10/2025 2324 by Bri Edgar, RN)  Discharge to home or other facility with appropriate resources:   Identify barriers to discharge with patient and caregiver   Identify discharge learning needs (meds, wound care, etc)   Refer to discharge planning if patient needs post-hospital services based on physician order or complex needs related to functional status, cognitive ability or social support system   Arrange for needed discharge resources and transportation as appropriate   Arrange for interpreters to assist at discharge as needed  4/11/2025 0437 by Annika Dumas RN  Outcome: Progressing  Flowsheets (Taken 4/10/2025 2324 by Bri Edgar, RN)  Discharge to home or other facility with appropriate resources:   Identify barriers to discharge with patient and caregiver   Identify discharge learning needs (meds, wound care, etc)   Refer to discharge planning if patient needs post-hospital services based on physician order or 
  Problem: Chronic Conditions and Co-morbidities  Goal: Patient's chronic conditions and co-morbidity symptoms are monitored and maintained or improved  4/12/2025 2127 by Dede Christianson, RN  Outcome: Progressing  4/12/2025 1156 by Dasia Arreola, RN  Outcome: Progressing  Flowsheets (Taken 4/12/2025 0810)  Care Plan - Patient's Chronic Conditions and Co-Morbidity Symptoms are Monitored and Maintained or Improved:   Collaborate with multidisciplinary team to address chronic and comorbid conditions and prevent exacerbation or deterioration   Monitor and assess patient's chronic conditions and comorbid symptoms for stability, deterioration, or improvement   Update acute care plan with appropriate goals if chronic or comorbid symptoms are exacerbated and prevent overall improvement and discharge     
  Problem: Chronic Conditions and Co-morbidities  Goal: Patient's chronic conditions and co-morbidity symptoms are monitored and maintained or improved  Outcome: Progressing     Problem: Discharge Planning  Goal: Discharge to home or other facility with appropriate resources  Outcome: Progressing  Flowsheets (Taken 4/10/2025 4564 by Bri Edgar, RN)  Discharge to home or other facility with appropriate resources:   Identify barriers to discharge with patient and caregiver   Identify discharge learning needs (meds, wound care, etc)   Refer to discharge planning if patient needs post-hospital services based on physician order or complex needs related to functional status, cognitive ability or social support system   Arrange for needed discharge resources and transportation as appropriate   Arrange for interpreters to assist at discharge as needed     Problem: Pain  Goal: Verbalizes/displays adequate comfort level or baseline comfort level  Outcome: Progressing     Problem: Safety - Adult  Goal: Free from fall injury  Outcome: Progressing     Problem: ABCDS Injury Assessment  Goal: Absence of physical injury  Outcome: Progressing     
  Problem: Chronic Conditions and Co-morbidities  Goal: Patient's chronic conditions and co-morbidity symptoms are monitored and maintained or improved  Outcome: Progressing  Flowsheets (Taken 4/13/2025 0722)  Care Plan - Patient's Chronic Conditions and Co-Morbidity Symptoms are Monitored and Maintained or Improved:   Monitor and assess patient's chronic conditions and comorbid symptoms for stability, deterioration, or improvement   Collaborate with multidisciplinary team to address chronic and comorbid conditions and prevent exacerbation or deterioration   Update acute care plan with appropriate goals if chronic or comorbid symptoms are exacerbated and prevent overall improvement and discharge     Problem: Discharge Planning  Goal: Discharge to home or other facility with appropriate resources  Outcome: Progressing  Flowsheets (Taken 4/13/2025 0722)  Discharge to home or other facility with appropriate resources:   Identify barriers to discharge with patient and caregiver   Arrange for needed discharge resources and transportation as appropriate   Identify discharge learning needs (meds, wound care, etc)   Refer to discharge planning if patient needs post-hospital services based on physician order or complex needs related to functional status, cognitive ability or social support system     Problem: Pain  Goal: Verbalizes/displays adequate comfort level or baseline comfort level  Outcome: Progressing  Flowsheets (Taken 4/13/2025 0722)  Verbalizes/displays adequate comfort level or baseline comfort level:   Encourage patient to monitor pain and request assistance   Assess pain using appropriate pain scale   Administer analgesics based on type and severity of pain and evaluate response   Implement non-pharmacological measures as appropriate and evaluate response     Problem: Safety - Adult  Goal: Free from fall injury  Outcome: Progressing  Flowsheets (Taken 4/13/2025 0722)  Free From Fall Injury: Based on caregiver 
RN  Outcome: Progressing  Flowsheets (Taken 4/12/2025 0999)  Absence of Physical Injury: Implement safety measures based on patient assessment  4/12/2025 0004 by Charlette Middleton, RN  Outcome: Progressing

## 2025-04-13 NOTE — PROGRESS NOTES
Internal Medicine  Discharge Summary    NAME: Herb Rand  :  1950  MRN:  [id number]  PCP:Gabe Goodwin MD  ADMITTED: 4/10/2025      DISCHARGED: 25    ADMITTING PHYSICIAN: Zack Lucero MD    CONSULTANT(S):   IP CONSULT TO ONCOLOGY  IP CONSULT TO NEPHROLOGY  IP CONSULT TO SOCIAL WORK     ADMITTING DIAGNOSIS:   Pleural effusion [J90]  Other ascites [R18.8]  Dyspnea, unspecified type [R06.00]  Esophageal cancer, stage IV (HCC) [C15.9]     DISCHARGE DIAGNOSES:   ***    BRIEF HISTORY OF PRESENT ILLNESS:   ***    LABS::  Lab Results   Component Value Date    WBC 4.0 (L) 2025    HGB 10.7 (L) 2025    HCT 32.5 (L) 2025     (L) 2025     2025    K 4.5 2025    CL 99 2025    CREATININE 1.2 2025    BUN 25 (H) 2025    CO2 21 (L) 2025    GLUCOSE 135 (H) 2025    ALT 10 2025    AST 11 2025    INR 1.2 04/10/2025    APTT 26.6 04/10/2025     Lab Results   Component Value Date    INR 1.2 04/10/2025    INR 1.2 2025    INR 1.3 2020    PROTIME 13.1 (H) 04/10/2025    PROTIME 12.7 (H) 2025    PROTIME 15.1 (H) 2020      Lab Results   Component Value Date    TSH 1.06 2024     Lab Results   Component Value Date    TRIG 94 2024    TRIG 186 (H) 2022     Lab Results   Component Value Date    HDL 35 (L) 2024    HDL 35 2022     No components found for: \"LDLCALC\"  Lab Results   Component Value Date    LABA1C 9.2 (H) 2024       IMAGING:  XR CHEST PORTABLE   Final Result   Persistent bilateral pleural effusions most pronounced on the right without   evidence of pneumothorax.         IR US GUIDED PARACENTESIS   Final Result   Successful paracentesis.         IR GUIDED THORACENTESIS PLEURAL   Final Result   Successful ultrasound guided thoracentesis.         XR CHEST 1 VIEW   Final Result   1. There is no right pneumothorax status post right thoracentesis   2. Residual 
  Nephrology Progress Note  Patient's Name: Herb Rand  12:40 PM  4/13/2025      Reason for Consult:  DAX, hypotension    History of Present Illness:    Herb Rand is a 74 y.o. male with PMHx metastatic gastroesophageal adenocarcinoma, pMMR.  HER2 1+, PD-L1 90% started on palliative chemo with FOLFOX and nivolumab 3/11/2025 although third cycle delayed.  He has been undergoing complications including recurrent abdominal ascites had for therapeutic paracentesis April 4 with 7.5 L of bloody ascitic fluid removed.  He also has past medical history of hypertension and type 2 diabetes along with hyperlipidemia and obstructive sleep apnea.  Also with coronary artery disease status post PCI with 2 stents in RCA per the patient in the last year.    Patient began having worsening shortness of breath and leg swelling as well as increased abdominal distention.  He was advised by the [facility] (follows Dr. Sheppard) to present to the ED.    Initial workup in the emergency department included a CT angiogram of the chest as well as a CT of the abdomen and pelvis with IV contrast.  Abdominal imaging revealed large volume four-quadrant ascites, appearance of anterior omental/peritoneal masses worrisome for malignancy, thickening of the roof and left lateral wall of the urinary bladder worrisome for malignancy along with large right and small left pleural effusions along with cirrhotic liver and bilateral renal cortical atrophy.  Labs on 4/10 showed creatinine 1.1 mg/dL which is similar to baseline 0.7 to 1.0 mg/dL and creatinine brenden to 1.4 mg/dL later 1.6 mg/dL which prompted a nephrology consultation.  Patient underwent paracentesis and thoracentesis Friday.  5.8 L was removed by abdominal paracentesis and 1200 cc was removed with thoracentesis.    Interval History:    4/13: Patient seen and examined at bedside.  He reports feeling better.  He continues to have some mild to moderate lower extremity swelling.  Labs 
  Primary Care Physician: Gabe Goodwin MD   Admitting Physician:  Zack Lucero MD  Admission date and time: 4/10/2025  1:38 PM    Room:  [address] [place] [address]  Admitting diagnosis: Pleural effusion [J90]  Other ascites [R18.8]  Dyspnea, unspecified type [R06.00]  Esophageal cancer, stage IV (HCC) [C15.9]    Patient Name: Herb Rand  MRN: [id number]    Date of Service: 4/13/2025     Subjective:  Herb is a 74 y.o. male who was seen and examined today,4/13/2025, at the bedside.  Patient feeling much weaker today blood pressure was on the lower side IV albumin 50 g advised discussed the case with Dr. Peter Quintana from nephrology.  Patient sitting up in bed eating breakfast feeling much better today is complaining of lower back discomfort secondary to degenerative disc disease.  No trouble with dysphagia no chest pain no shortness of breath    No family present during my examination.    Review of System:   Constitutional:   Denies fever or chills, weight loss or gain, fatigue or malaise.  HEENT:   Denies ear pain, sore throat, sinus or eye problems.  Cardiovascular:   Denies any chest pain, irregular heartbeats, or palpitations.   Respiratory:   Denies shortness of breath, coughing, sputum production, hemoptysis, or wheezing.  Gastrointestinal:   Denies nausea, vomiting, diarrhea, or constipation.  Denies any abdominal pain.  Genitourinary:    Denies any urgency, frequency, hematuria. Voiding  without difficulty.  Extremities:   Positive for bilateral lower extremity swelling  Neurology:    Denies any headache or focal neurological deficits, Denies generalized weakness or memory difficulty.   Psch:   Denies being anxious or depressed.  Musculoskeletal:    Denies  myalgias, joint complaints or back pain.   Integumentary:   Denies any rashes, ulcers, or excoriations.  Denies bruising.  Hematologic/Lymphatic:  Denies bruising or bleeding.    Physical Exam:  No intake/output data recorded.    Intake/Output Summary (Last 
  Primary Care Physician: Gabe Goodwin MD   Admitting Physician:  Zack Lucero MD  Admission date and time: 4/10/2025  1:38 PM    Room:  [address] [place] [address]  Admitting diagnosis: Pleural effusion [J90]  Other ascites [R18.8]  Dyspnea, unspecified type [R06.00]  Esophageal cancer, stage IV (HCC) [C15.9]    Patient Name: Herb Rand  MRN: [id number]    Date of Service: 4/12/2025     Subjective:  Herb is a 74 y.o. male who was seen and examined today,4/12/2025, at the bedside.  Patient feeling much weaker today blood pressure was on the lower side IV albumin 50 g advised discussed the case with Dr. Peter Quintana from nephrology    No family present during my examination.    Review of System:   Constitutional:   Denies fever or chills, weight loss or gain, fatigue or malaise.  HEENT:   Denies ear pain, sore throat, sinus or eye problems.  Cardiovascular:   Denies any chest pain, irregular heartbeats, or palpitations.   Respiratory:   Denies shortness of breath, coughing, sputum production, hemoptysis, or wheezing.  Gastrointestinal:   Denies nausea, vomiting, diarrhea, or constipation.  Denies any abdominal pain.  Genitourinary:    Denies any urgency, frequency, hematuria. Voiding  without difficulty.  Extremities:   Positive for bilateral lower extremity swelling  Neurology:    Denies any headache or focal neurological deficits, Denies generalized weakness or memory difficulty.   Psch:   Denies being anxious or depressed.  Musculoskeletal:    Denies  myalgias, joint complaints or back pain.   Integumentary:   Denies any rashes, ulcers, or excoriations.  Denies bruising.  Hematologic/Lymphatic:  Denies bruising or bleeding.    Physical Exam:  No intake/output data recorded.    Intake/Output Summary (Last 24 hours) at 4/12/2025 1202  Last data filed at 4/12/2025 0558  Gross per 24 hour   Intake 480 ml   Output --   Net 480 ml   I/O last 3 completed shifts:  In: 480 [P.O.:480]  Out: -   Patient Vitals for the past 96 
  Subjective:    The patient is awake and alert, sitting up in chair on 4L O2. He complains of abdominal discomfort and sob. Currently waiting for plan on para/thoracentesis. Denies n/v/d. States he has not been able to eat much due to the amount of fluid that has accumulated so quickly.    Objective:    BP 94/69   Pulse (!) 110   Temp 98.3 °F (36.8 °C) (Oral)   Resp 17   Ht 1.88 m (6' 2\")   Wt 133.8 kg (294 lb 15.6 oz)   SpO2 93%   BMI 37.87 kg/m²     General: Alert, oriented, no acute distress. Appears ill  HEENT: No thrush or mucositis, EOMI, PERRLA  Heart:  RRR, no murmurs, gallops, or rubs.  Lungs:  Diminished, no wheeze, rales or rhonchi. 4L O2  Abd: BS present, severe distention, ascites and hepatomegaly   Extrem:  No clubbing, cyanosis, BLE edmea  Lymphatics: No palpable adenopathy in cervical and supraclavicular regions  Skin: Intact, no petechia or purpura    CBC with Differential:    Lab Results   Component Value Date/Time    WBC 4.6 04/11/2025 04:36 AM    RBC 3.39 04/11/2025 04:36 AM    HGB 10.7 04/11/2025 04:36 AM    HCT 32.2 04/11/2025 04:36 AM     04/11/2025 04:36 AM    MCV 95.0 04/11/2025 04:36 AM    MCH 31.6 04/11/2025 04:36 AM    MCHC 33.2 04/11/2025 04:36 AM    RDW 15.5 04/11/2025 04:36 AM    LYMPHOPCT 4 04/11/2025 04:36 AM    MONOPCT 15 04/11/2025 04:36 AM    EOSPCT 0 04/11/2025 04:36 AM    BASOPCT 0 04/11/2025 04:36 AM    MONOSABS 0.71 04/11/2025 04:36 AM    LYMPHSABS 0.19 04/11/2025 04:36 AM    EOSABS 0.01 04/11/2025 04:36 AM    BASOSABS 0.02 04/11/2025 04:36 AM     CMP:    Lab Results   Component Value Date/Time     04/10/2025 02:30 PM    K 4.1 04/10/2025 02:30 PM    K 3.1 03/24/2022 01:19 PM     04/10/2025 02:30 PM    CO2 21 04/10/2025 02:30 PM    BUN 15 04/10/2025 02:30 PM    CREATININE 1.1 04/10/2025 02:30 PM    GFRAA >60 03/26/2022 05:15 AM    LABGLOM 71 04/10/2025 02:30 PM    GLUCOSE 119 04/10/2025 02:30 PM    CALCIUM 8.2 04/10/2025 02:30 PM    BILITOT 0.3 
4 Eyes Skin Assessment     NAME:  Herb Rand  YOB: 1950  MEDICAL RECORD NUMBER:  [id number]    The patient is being assessed for  Admission    I agree that at least one RN has performed a thorough Head to Toe Skin Assessment on the patient. ALL assessment sites listed below have been assessed.      Areas assessed by both nurses:    Head, Face, Ears, Shoulders, Back, Chest, Arms, Elbows, Hands, Sacrum. Buttock, Coccyx, Ischium, and Legs. Feet and Heels        Does the Patient have a Wound? No noted wound(s)       Manuel Prevention initiated by RN: No  Wound Care Orders initiated by RN: No    Pressure Injury (Stage 3,4, Unstageable, DTI, NWPT, and Complex wounds) if present, place Wound referral order by RN under : No    New Ostomies, if present place, Ostomy referral order under : No     Nurse 1 eSignature: Electronically signed by Annika Dumas RN on 4/11/25 at 4:37 AM EDT    **SHARE this note so that the co-signing nurse can place an eSignature**    Nurse 2 eSignature: Electronically signed by Bri Edgar RN on 4/11/25 at 5:37 AM EDT    
Patient is taken for special procedures at this time. Unsure if thoracentesis or paracentesis is planned at this time, both have been ordered. Will await report from IR  
Physical Therapy  Physical Therapy Initial Evaluation/Plan of Care    Room #:  0637/0637-01  Patient Name: Herb Rand  YOB: 1950  MRN: [id number]    Date of Service: 4/11/2025     Tentative placement recommendation: Subacute Rehab  Equipment recommendation: To be determined      Evaluating Physical Therapist: Maurice Rashid, PT, DPT #[id number]      Specific Provider Orders/Date/Referring Provider :     04/10/25 2100    PT evaluation and treat  Start:  04/10/25 2100,   End:  04/10/25 2100,   ONE TIME,   Standing Count:  1 Occurrences,   R       Zack Lucero MD Acknowledge New    Admitting Diagnosis:   Pleural effusion [J90]  Other ascites [R18.8]  Dyspnea, unspecified type [R06.00]  Esophageal cancer, stage IV (HCC) [C15.9]      Surgery: none  Visit Diagnoses         Codes      Other ascites     R18.8      Dyspnea, unspecified type     R06.00            Patient Active Problem List   Diagnosis    COVID-19 virus infection    Sepsis (HCC)    Paroxysmal atrial fibrillation (HCC)    Essential hypertension    Morbid obesity    Bladder diverticulum    CAD in native artery    Acute electrocardiography changes    Syncope and collapse    Pleural effusion    Esophageal cancer, stage IV (HCC)        ASSESSMENT of Current Deficits Patient exhibits decreased strength, balance, and endurance impairing functional mobility, transfers, gait , gait distance, and tolerance to activity. Pt required increased time for all mobility during session with Sindhu for transfer and short ambulation to chair.      PHYSICAL THERAPY  PLAN OF CARE       Physical therapy plan of care is established based on physician order,  patient diagnosis and clinical assessment    Current Treatment Recommendations:    -Bed Mobility: Lower and upper extremity exercises, and trunk control activities  -Sitting Balance: Incorporate reaching activities to activate trunk muscles , Hands on support to maintain midline , Facilitate active trunk muscle 
Physical Therapy  Physical Therapy Treatment Note/Plan of Care    Room #:  0637/0637-01  Patient Name: Herb Rand  YOB: 1950  MRN: [id number]    Date of Service: 4/13/2025     Tentative placement recommendation: Home with Home Health Physical Therapy   Equipment recommendation: To be determined      Evaluating Physical Therapist: Maurice Rashid, PT, DPT #[id number]      Specific Provider Orders/Date/Referring Provider :     04/10/25 2100    PT evaluation and treat  Start:  04/10/25 2100,   End:  04/10/25 2100,   ONE TIME,   Standing Count:  1 Occurrences,   R       Zack Lucero MD Acknowledge New    Admitting Diagnosis:   Pleural effusion [J90]  Other ascites [R18.8]  Dyspnea, unspecified type [R06.00]  Esophageal cancer, stage IV (HCC) [C15.9]      Surgery: none  Visit Diagnoses         Codes      Other ascites     R18.8      Dyspnea, unspecified type     R06.00            Patient Active Problem List   Diagnosis    COVID-19 virus infection    Sepsis (HCC)    Paroxysmal atrial fibrillation (HCC)    Essential hypertension    Morbid obesity    Bladder diverticulum    CAD in native artery    Acute electrocardiography changes    Syncope and collapse    Pleural effusion    Esophageal cancer, stage IV (HCC)        ASSESSMENT of Current Deficits Patient exhibits decreased strength, balance, and endurance impairing functional mobility, transfers, gait , gait distance, and tolerance to activity. Patient able to be weaned to room air during session. Patient is adamant on return home vs rehab facility. Patient maintained 89-93% during session room air with activity. Performed bed mobility, transfers, seated exercises, ambulation and stair training this session with good outcome. Patient demonstrates good understanding of diaphragm breathing and energy conservation. Performed all function with supervision level and cues throughout for pursed lip breathing and rest breaks. Patient benefits from use of wheeled 
Spiritual Health History and Assessment/Progress Note  [facility]    (P) Spiritual/Emotional Needs,  ,  ,      Name: Herb Rand MRN: [id number]    Age: 74 y.o.     Sex: male   Language: English   Muslim: Non-Orthodox   Pleural effusion     Date: 4/11/2025                           Spiritual Assessment began in [facility] I[facility]        Referral/Consult From: (P) Rounding   Encounter Overview/Reason: (P) Spiritual/Emotional Needs  Service Provided For: (P) Patient    Kat, Belief, Meaning:   Patient is connected with a kat tradition or spiritual practice and has beliefs or practices that help with coping during difficult times  Family/Friends No family/friends present      Importance and Influence:  Patient has spiritual/personal beliefs that influence decisions regarding their health  Family/Friends No family/friends present    Community:  Patient feels well-supported. Support system includes: Spouse/Partner and Extended family  Family/Friends No family/friends present    Assessment and Plan of Care:     Patient Interventions include: Facilitated expression of thoughts and feelings, Explored spiritual coping/struggle/distress, Engaged in theological reflection, and Facilitated life review and/ or legacy  Family/Friends Interventions include: No family/friends present    Patient Plan of Care: Spiritual Care available upon further referral  Family/Friends Plan of Care: No family/friends present    Electronically signed by Chaplain Phil on 4/11/2025 at 1:00 PM    
This nurse spoke with Dr Lucero as Dr Quintana is \"okay\" with patient being discharged to home today on 20mg PO Lasix. Dr Lucero will be doing discharge. Nadja from Social work in to talk with patient re: HHC and to set up if patient agreeable.   
liver.   6. Bilateral renal cortical atrophy.         XR CHEST 1 VIEW   Final Result   Significant elevation of the right hemidiaphragm with right lower lobe   atelectasis and infiltrate.                  Assessment:  Severe shortness of breath and tachycardia  Right-sided pleural effusion most likely malignant  History of esophageal cancer being treated at the [facility]  Ascites status post paracentesis on 4/4/2025  Coronary artery disease status post stenting currently on Effient holding for the last 2 days  Hyperlipidemia  Hypertension  COPD  Obesity    Plan:     Admit the patient  Consult IR  Continue to hold Effient  Continue rest of the home medication  DVT GI prophylaxis     Medical decision making is complex in this patient given the patient's complex medical history of esophageal cancer pleural effusion and ascites currently taking Effient for stent placement in the heart it is recommended to discontinue Effient at least 7 days before undergoing thoracentesis this recommendation is based on the need to minimize the risk of bleeding however given the patient's complex medical history including recent coronary stent placement it is crucial to balance the risk of bleeding with the risk of thrombotic event therefore coordination with cardiology is needed if we stop the medication there is a increased risk of thrombotic cardiovascular events including stent thrombosis and myocardial infarction the American College of cardiology and American Heart Association recommends that the continue aspirin during this.  Of stopping medication.     Aspirin is the recommended alternative antiplatelet therapy during the perioperative.  For this patient with careful consideration and coordination and monitoring to manage the risk associated with both bleeding and thrombotic events.     If we are able to stop the tachycardia as the patient does not have any pulmonary embolism and we had to continue to stop the medication for

## 2025-04-13 NOTE — CARE COORDINATION
4/13/2025 1301 CM note: Weekend note: Per  pt may be discharged today. Per nursing, pt did not qualify for home o2-await documentation. He plans to return home with his wife and is agreeable to [facility], no preference. [facility] accepted pt and received orders. Wife to transport pt home. Clari READ        The Plan for Transition of Care is related to the following treatment goals: [facility]    The Patient  was provided with a choice of provider and agrees   with the discharge plan. [x] Yes [] No    Freedom of choice list was provided with basic dialogue that supports the patient's individualized plan of care/goals, treatment preferences and shares the quality data associated with the providers. [] Yes [x] No   declined list no preferencep

## 2025-04-15 LAB
MICROORGANISM SPEC CULT: NORMAL
MICROORGANISM SPEC CULT: NORMAL
SERVICE CMNT-IMP: NORMAL
SERVICE CMNT-IMP: NORMAL
SPECIMEN DESCRIPTION: NORMAL
SPECIMEN DESCRIPTION: NORMAL

## 2025-04-18 ENCOUNTER — APPOINTMENT (OUTPATIENT)
Dept: GENERAL RADIOLOGY | Age: 75
End: 2025-04-18
Payer: MEDICARE

## 2025-04-18 ENCOUNTER — HOSPITAL ENCOUNTER (EMERGENCY)
Age: 75
Discharge: HOME | End: 2025-04-18
Attending: EMERGENCY MEDICINE
Payer: MEDICARE

## 2025-04-18 ENCOUNTER — APPOINTMENT (OUTPATIENT)
Dept: INTERVENTIONAL RADIOLOGY/VASCULAR | Age: 75
End: 2025-04-18
Payer: MEDICARE

## 2025-04-18 VITALS
DIASTOLIC BLOOD PRESSURE: 72 MMHG | OXYGEN SATURATION: 94 % | TEMPERATURE: 97.7 F | HEART RATE: 106 BPM | RESPIRATION RATE: 22 BRPM | SYSTOLIC BLOOD PRESSURE: 102 MMHG | HEIGHT: 74 IN | BODY MASS INDEX: 36.7 KG/M2 | WEIGHT: 286 LBS

## 2025-04-18 DIAGNOSIS — R33.9 URINARY RETENTION: ICD-10-CM

## 2025-04-18 DIAGNOSIS — J90 PLEURAL EFFUSION ON RIGHT: ICD-10-CM

## 2025-04-18 DIAGNOSIS — R18.0 MALIGNANT ASCITES (HCC): Primary | ICD-10-CM

## 2025-04-18 LAB
ALBUMIN SERPL-MCNC: 2.5 G/DL (ref 3.5–5.2)
ALP SERPL-CCNC: 77 U/L (ref 40–129)
ALT SERPL-CCNC: 16 U/L (ref 0–40)
ANION GAP SERPL CALCULATED.3IONS-SCNC: 12 MMOL/L (ref 7–16)
AST SERPL-CCNC: 14 U/L (ref 0–39)
BASOPHILS # BLD: 0 K/UL (ref 0–0.2)
BASOPHILS NFR BLD: 0 % (ref 0–2)
BILIRUB SERPL-MCNC: 0.2 MG/DL (ref 0–1.2)
BUN SERPL-MCNC: 41 MG/DL (ref 6–23)
CALCIUM SERPL-MCNC: 8.3 MG/DL (ref 8.6–10.2)
CHLORIDE SERPL-SCNC: 97 MMOL/L (ref 98–107)
CO2 SERPL-SCNC: 20 MMOL/L (ref 22–29)
CREAT SERPL-MCNC: 1.3 MG/DL (ref 0.7–1.2)
EKG ATRIAL RATE: 120 BPM
EKG P AXIS: 4 DEGREES
EKG P-R INTERVAL: 180 MS
EKG Q-T INTERVAL: 334 MS
EKG QRS DURATION: 132 MS
EKG QTC CALCULATION (BAZETT): 472 MS
EKG R AXIS: -82 DEGREES
EKG T AXIS: 31 DEGREES
EKG VENTRICULAR RATE: 120 BPM
EOSINOPHIL # BLD: 0 K/UL (ref 0.05–0.5)
EOSINOPHILS RELATIVE PERCENT: 0 % (ref 0–6)
ERYTHROCYTE [DISTWIDTH] IN BLOOD BY AUTOMATED COUNT: 15.1 % (ref 11.5–15)
GFR, ESTIMATED: 58 ML/MIN/1.73M2
GLUCOSE SERPL-MCNC: 254 MG/DL (ref 74–99)
HCT VFR BLD AUTO: 33.4 % (ref 37–54)
HGB BLD-MCNC: 11.1 G/DL (ref 12.5–16.5)
INR PPP: 1.1
LYMPHOCYTES NFR BLD: 0.14 K/UL (ref 1.5–4)
LYMPHOCYTES RELATIVE PERCENT: 2 % (ref 20–42)
MCH RBC QN AUTO: 31.8 PG (ref 26–35)
MCHC RBC AUTO-ENTMCNC: 33.2 G/DL (ref 32–34.5)
MCV RBC AUTO: 95.7 FL (ref 80–99.9)
METAMYELOCYTES ABSOLUTE COUNT: 0.28 K/UL (ref 0–0.12)
METAMYELOCYTES: 4 % (ref 0–1)
MONOCYTES NFR BLD: 0.14 K/UL (ref 0.1–0.95)
MONOCYTES NFR BLD: 2 % (ref 2–12)
MYELOCYTES ABSOLUTE COUNT: 0.07 K/UL
MYELOCYTES: 1 %
NEUTROPHILS NFR BLD: 92 % (ref 43–80)
NEUTS SEG NFR BLD: 7.47 K/UL (ref 1.8–7.3)
PARTIAL THROMBOPLASTIN TIME: 20.2 SEC (ref 24.5–35.1)
PLATELET # BLD AUTO: 151 K/UL (ref 130–450)
PMV BLD AUTO: 8.5 FL (ref 7–12)
POTASSIUM SERPL-SCNC: 4.4 MMOL/L (ref 3.5–5)
PROT SERPL-MCNC: 5 G/DL (ref 6.4–8.3)
PROTHROMBIN TIME: 12 SEC (ref 9.3–12.4)
RBC # BLD AUTO: 3.49 M/UL (ref 3.8–5.8)
RBC # BLD: NORMAL 10*6/UL
SODIUM SERPL-SCNC: 129 MMOL/L (ref 132–146)
WBC OTHER # BLD: 8.1 K/UL (ref 4.5–11.5)

## 2025-04-18 PROCEDURE — 6360000002 HC RX W HCPCS: Performed by: EMERGENCY MEDICINE

## 2025-04-18 PROCEDURE — 80053 COMPREHEN METABOLIC PANEL: CPT

## 2025-04-18 PROCEDURE — 85025 COMPLETE CBC W/AUTO DIFF WBC: CPT

## 2025-04-18 PROCEDURE — 71045 X-RAY EXAM CHEST 1 VIEW: CPT

## 2025-04-18 PROCEDURE — 85730 THROMBOPLASTIN TIME PARTIAL: CPT

## 2025-04-18 PROCEDURE — 93005 ELECTROCARDIOGRAM TRACING: CPT | Performed by: EMERGENCY MEDICINE

## 2025-04-18 PROCEDURE — 71046 X-RAY EXAM CHEST 2 VIEWS: CPT

## 2025-04-18 PROCEDURE — 99285 EMERGENCY DEPT VISIT HI MDM: CPT

## 2025-04-18 PROCEDURE — 85610 PROTHROMBIN TIME: CPT

## 2025-04-18 PROCEDURE — 32555 ASPIRATE PLEURA W/ IMAGING: CPT

## 2025-04-18 PROCEDURE — 93010 ELECTROCARDIOGRAM REPORT: CPT | Performed by: INTERNAL MEDICINE

## 2025-04-18 PROCEDURE — P9047 ALBUMIN (HUMAN), 25%, 50ML: HCPCS | Performed by: EMERGENCY MEDICINE

## 2025-04-18 PROCEDURE — C1729 CATH, DRAINAGE: HCPCS

## 2025-04-18 RX ORDER — ALBUMIN (HUMAN) 12.5 G/50ML
25 SOLUTION INTRAVENOUS ONCE
Status: COMPLETED | OUTPATIENT
Start: 2025-04-18 | End: 2025-04-18

## 2025-04-18 RX ORDER — POTASSIUM CHLORIDE 1500 MG/1
20 TABLET, EXTENDED RELEASE ORAL DAILY
Status: ON HOLD | COMMUNITY
End: 2025-04-26 | Stop reason: HOSPADM

## 2025-04-18 RX ADMIN — ALBUMIN (HUMAN) 25 G: 0.25 INJECTION, SOLUTION INTRAVENOUS at 16:08

## 2025-04-18 ASSESSMENT — ENCOUNTER SYMPTOMS
ABDOMINAL DISTENTION: 1
DIARRHEA: 0
COLOR CHANGE: 0
VOMITING: 0
NAUSEA: 0
SHORTNESS OF BREATH: 1
ABDOMINAL PAIN: 1

## 2025-04-18 ASSESSMENT — PAIN DESCRIPTION - DESCRIPTORS: DESCRIPTORS: SHARP;TIGHTNESS;DISCOMFORT

## 2025-04-18 ASSESSMENT — PAIN DESCRIPTION - PAIN TYPE: TYPE: ACUTE PAIN;CHRONIC PAIN

## 2025-04-18 ASSESSMENT — PAIN - FUNCTIONAL ASSESSMENT
PAIN_FUNCTIONAL_ASSESSMENT: NONE - DENIES PAIN
PAIN_FUNCTIONAL_ASSESSMENT: 0-10

## 2025-04-18 ASSESSMENT — PAIN DESCRIPTION - ONSET: ONSET: ON-GOING

## 2025-04-18 ASSESSMENT — PAIN SCALES - GENERAL: PAINLEVEL_OUTOF10: 5

## 2025-04-18 ASSESSMENT — PAIN DESCRIPTION - LOCATION: LOCATION: ABDOMEN

## 2025-04-18 ASSESSMENT — PAIN DESCRIPTION - FREQUENCY: FREQUENCY: CONTINUOUS

## 2025-04-18 NOTE — ED NOTES
Paracentesis done at bedside 6400ml , patient tolerated well.   Thoracentesis completed in IR, reported 1L removed. Again patient tolerated well, v/s remain stable , will continue to monitor.   no

## 2025-04-18 NOTE — ED NOTES
Bladder scanned per physician team, ok to d/c angelika prior to discharge, instructed to return to ER with any urinary retention problems, voiced understanding.

## 2025-04-18 NOTE — ED PROVIDER NOTES
PROCEDURE NOTE  4/18/25       Time: 1416    PARACENTESIS   Risks, benefits and alternatives (for applicable procedures below) described.   Performed By: EM Attending Physician and EM Resident.    Indication: Therapeutic Ascites.  Informed consent: Written consent obtained. The patient was counseled regarding the procedure in person, it's indications, risks, potential complications and alternatives and any questions were answered. Consent was obtained..  Prep:  The skin was cleansed with povidone iodine and draped in a sterile fashion.  Prior to start of procedure, consent was obtained for image guided paracentesis.  Routine scanning of all four abdominal quadrants was performed using real-time ultrasound and revealed moderate amount of ascites fluid present. Decision was made to proceed with procedure.  After obtaining consent, the patient was placed in the supine position with the head of the bed slightly elevated and the appropriate landmarks were identified. The skin over the puncture site in the right lower quadrant region was prepped with betadine and draped in a sterile fashion. Local anesthesia was obtained by infiltration using 1% Lidocaine without epinephrine. A paracentesis catheter was then advanced into the abdominal cavity over a needle and the needle was withdrawn. Fluid return was blood tinged colored.  A total volume of 6400 was withdrawn. Fluid obtained was not sent for analysis. The catheter was then withdrawn and a sterile dressing was placed over the site. The patient tolerated the procedure well. Complications: None. Estimated Blood Loss: < 10 cc..    Complications: None  The patient tolerated the procedure well.          Anticoagulation Progress Note    Indication: Atrial Fibrillation  Goal INR: 2.0-3.0  Duration: indefinite  Order Expiration Date: 1/13/22  Pertinent History: ICD    Assessment  (-) missed doses:   (-) extra doses:   (-) significant medication changes (RX, OTC, Herbal):  (-) Vitamin K / dietary changes (Vitamin K goal: 1 Cups/week):    (-) bleeding / bruising:  (-) alcohol intake:  (+) falls / injury: 9/20/21 - Pt reports slipping on the stairs 2 weeks ago. Denies hitting head.  (-) acute illness:    (-) procedures / hospitalization / ER visits:    Plan (5 mg tablets)  INR Result: 2.8  The INR result for today is therapeutic based on the INR goal 2.0-3.0.  Etiology:   Recommended Dose:  CPM 10mgMF;7.5mgROW ( 57.5mg/wk)  Follow-Up: 4 weeks - 10/18/21  Comments:     Counseling  Stressed importance of compliance with exact recommended dosage regimen  Discussed measures to reduce risk for falls and appropriate action when serious injury occurs  Instructed to notify clinic about medication changes or health status changes  Instructed to notify clinic about scheduled procedures  Discussed risk of thrombosis and/or bleeding with inappropriate anticoagulant use and monitoring frequency    Provided patient/caregiver with written and verbal dosing instructions, patient/caregiver verbalized understanding.

## 2025-04-18 NOTE — PROGRESS NOTES
Patient came down to Special Procedures for ultrasound guided right sided thoracentesis.    Procedure was explained, questions were answered.    1508   Starting procedure VS 84/67 103 HR 26RR 95% on RA     1512   Ending procedure VS 90/68 105HR 24RR 94% on RA     1000 cc of red color pleural fluid drained from patient, petrolatum dressing folded 4 x 4 and tegaderm applied to right mid back.     Patients DSD can be removed in 24 hours    Patient tolerated procedure    Post procedure chest xray taken    No specimens ordered or needed per Dr Ronquillo.    Patient transported back to ED. Nurse to nurse given at bedside with ED RN, nurse notified of above information.

## 2025-04-18 NOTE — DISCHARGE INSTRUCTIONS
Plan for weekly paracentesis at [facility] interventional radiology on the first floor at 10 AM every Friday.

## 2025-04-18 NOTE — CARE COORDINATION
4/18/25 SS Note: Pt w/shortness of breath and abdominal distention. Pt is recent admission 4/10 - 4/13 for Pleural effusion. Paracentesis done at bedside 6400ml & Thoracentesis completed in IR, reported 1L removed. Special   has arranged for him to have weekly paracentesis at 10 AM on Fridays. Pt dx w/malignant ascites & pleural effusion. Care Instructions for Ascites, Thoracentesis & Urinary Retention were given to pt & she/he can be d/c'd. Readmission avoided.   Electronically signed by LORI Spears on 4/18/2025 at 4:56 PM

## 2025-04-18 NOTE — ED PROVIDER NOTES
Patient presents from the emergency department with complaint of shortness of breath and abdominal distention.  He states that he gets routine paracentesis and occasionally a thoracentesis for fluid buildup secondary to esophageal cancer.  Patient denies any fevers or chills.  He states no nausea or vomiting.  He has mild chest discomfort when taking a deep breath since resolved.    The history is provided by the patient.       Review of Systems   Constitutional:  Positive for activity change and fatigue. Negative for chills and fever.   HENT: Negative.     Respiratory:  Positive for shortness of breath.    Cardiovascular:  Positive for chest pain and leg swelling. Negative for palpitations.   Gastrointestinal:  Positive for abdominal distention and abdominal pain. Negative for diarrhea, nausea and vomiting.   Genitourinary:  Positive for decreased urine volume and difficulty urinating.   Skin:  Negative for color change.   Neurological:  Positive for weakness (generalized). Negative for light-headedness.   Psychiatric/Behavioral: Negative.         Physical Exam  Vitals and nursing note reviewed.   Constitutional:       General: He is not in acute distress.     Appearance: He is well-developed. He is obese. He is ill-appearing (chronic). He is not toxic-appearing.   HENT:      Head: Normocephalic and atraumatic.   Eyes:      Pupils: Pupils are equal, round, and reactive to light.   Cardiovascular:      Rate and Rhythm: Normal rate and regular rhythm.      Heart sounds: Normal heart sounds. No murmur heard.  Pulmonary:      Effort: Pulmonary effort is normal. No respiratory distress.      Breath sounds: No wheezing or rales.      Comments: Diminished breath sounds especially on the right side  Abdominal:      General: Abdomen is protuberant. Bowel sounds are normal. There is distension.      Palpations: Abdomen is soft. There is fluid wave.      Tenderness: There is abdominal tenderness. There is no guarding or

## 2025-04-22 ENCOUNTER — HOSPITAL ENCOUNTER (INPATIENT)
Age: 75
LOS: 8 days | Discharge: NURSING, REHAB OR LONG-TERM CARE | DRG: 375 | End: 2025-04-30
Attending: STUDENT IN AN ORGANIZED HEALTH CARE EDUCATION/TRAINING PROGRAM | Admitting: INTERNAL MEDICINE
Payer: MEDICARE

## 2025-04-22 ENCOUNTER — APPOINTMENT (OUTPATIENT)
Dept: GENERAL RADIOLOGY | Age: 75
DRG: 375 | End: 2025-04-22
Payer: MEDICARE

## 2025-04-22 ENCOUNTER — APPOINTMENT (OUTPATIENT)
Dept: CT IMAGING | Age: 75
DRG: 375 | End: 2025-04-22
Payer: MEDICARE

## 2025-04-22 DIAGNOSIS — R53.1 GENERALIZED WEAKNESS: ICD-10-CM

## 2025-04-22 DIAGNOSIS — I31.39 PERICARDIAL EFFUSION: ICD-10-CM

## 2025-04-22 DIAGNOSIS — J90 CHRONIC BILATERAL PLEURAL EFFUSIONS: ICD-10-CM

## 2025-04-22 DIAGNOSIS — R18.0 MALIGNANT ASCITES (HCC): ICD-10-CM

## 2025-04-22 DIAGNOSIS — E87.1 HYPONATREMIA: Primary | ICD-10-CM

## 2025-04-22 PROBLEM — C15.9 PRIMARY CANCER OF ESOPHAGUS WITH METASTASIS TO OTHER SITE (HCC): Status: ACTIVE | Noted: 2025-04-22

## 2025-04-22 LAB
ALBUMIN SERPL-MCNC: 2.3 G/DL (ref 3.5–5.2)
ALP SERPL-CCNC: 87 U/L (ref 40–129)
ALT SERPL-CCNC: 26 U/L (ref 0–40)
ANION GAP SERPL CALCULATED.3IONS-SCNC: 11 MMOL/L (ref 7–16)
AST SERPL-CCNC: 24 U/L (ref 0–39)
BASOPHILS # BLD: 0 K/UL (ref 0–0.2)
BASOPHILS NFR BLD: 0 % (ref 0–2)
BILIRUB SERPL-MCNC: 0.3 MG/DL (ref 0–1.2)
BUN SERPL-MCNC: 50 MG/DL (ref 6–23)
CALCIUM SERPL-MCNC: 8.5 MG/DL (ref 8.6–10.2)
CHLORIDE SERPL-SCNC: 96 MMOL/L (ref 98–107)
CO2 SERPL-SCNC: 19 MMOL/L (ref 22–29)
CREAT SERPL-MCNC: 1.5 MG/DL (ref 0.7–1.2)
EOSINOPHIL # BLD: 0 K/UL (ref 0.05–0.5)
EOSINOPHILS RELATIVE PERCENT: 0 % (ref 0–6)
ERYTHROCYTE [DISTWIDTH] IN BLOOD BY AUTOMATED COUNT: 15.4 % (ref 11.5–15)
GFR, ESTIMATED: 50 ML/MIN/1.73M2
GLUCOSE SERPL-MCNC: 168 MG/DL (ref 74–99)
HCT VFR BLD AUTO: 36.3 % (ref 37–54)
HGB BLD-MCNC: 11.9 G/DL (ref 12.5–16.5)
LYMPHOCYTES NFR BLD: 0.31 K/UL (ref 1.5–4)
LYMPHOCYTES RELATIVE PERCENT: 3 % (ref 20–42)
MCH RBC QN AUTO: 31.5 PG (ref 26–35)
MCHC RBC AUTO-ENTMCNC: 32.8 G/DL (ref 32–34.5)
MCV RBC AUTO: 96 FL (ref 80–99.9)
MONOCYTES NFR BLD: 0.52 K/UL (ref 0.1–0.95)
MONOCYTES NFR BLD: 5 % (ref 2–12)
NEUTROPHILS NFR BLD: 92 % (ref 43–80)
NEUTS SEG NFR BLD: 9.48 K/UL (ref 1.8–7.3)
PLATELET # BLD AUTO: 156 K/UL (ref 130–450)
PMV BLD AUTO: 9 FL (ref 7–12)
POTASSIUM SERPL-SCNC: 5.3 MMOL/L (ref 3.5–5)
PROT SERPL-MCNC: 5.2 G/DL (ref 6.4–8.3)
RBC # BLD AUTO: 3.78 M/UL (ref 3.8–5.8)
RBC # BLD: ABNORMAL 10*6/UL
SODIUM SERPL-SCNC: 126 MMOL/L (ref 132–146)
TROPONIN I SERPL HS-MCNC: 26 NG/L (ref 0–22)
TROPONIN I SERPL HS-MCNC: 26 NG/L (ref 0–22)
WBC OTHER # BLD: 10.3 K/UL (ref 4.5–11.5)

## 2025-04-22 PROCEDURE — 99285 EMERGENCY DEPT VISIT HI MDM: CPT

## 2025-04-22 PROCEDURE — 74177 CT ABD & PELVIS W/CONTRAST: CPT

## 2025-04-22 PROCEDURE — 2580000003 HC RX 258: Performed by: INTERNAL MEDICINE

## 2025-04-22 PROCEDURE — 2060000000 HC ICU INTERMEDIATE R&B

## 2025-04-22 PROCEDURE — P9047 ALBUMIN (HUMAN), 25%, 50ML: HCPCS | Performed by: INTERNAL MEDICINE

## 2025-04-22 PROCEDURE — 6360000002 HC RX W HCPCS: Performed by: INTERNAL MEDICINE

## 2025-04-22 PROCEDURE — 2580000003 HC RX 258

## 2025-04-22 PROCEDURE — 71046 X-RAY EXAM CHEST 2 VIEWS: CPT

## 2025-04-22 PROCEDURE — 93005 ELECTROCARDIOGRAM TRACING: CPT

## 2025-04-22 PROCEDURE — 80053 COMPREHEN METABOLIC PANEL: CPT

## 2025-04-22 PROCEDURE — 6360000004 HC RX CONTRAST MEDICATION: Performed by: RADIOLOGY

## 2025-04-22 PROCEDURE — 6370000000 HC RX 637 (ALT 250 FOR IP): Performed by: INTERNAL MEDICINE

## 2025-04-22 PROCEDURE — 85025 COMPLETE CBC W/AUTO DIFF WBC: CPT

## 2025-04-22 PROCEDURE — 84484 ASSAY OF TROPONIN QUANT: CPT

## 2025-04-22 RX ORDER — 0.9 % SODIUM CHLORIDE 0.9 %
500 INTRAVENOUS SOLUTION INTRAVENOUS ONCE
Status: COMPLETED | OUTPATIENT
Start: 2025-04-22 | End: 2025-04-22

## 2025-04-22 RX ORDER — POTASSIUM CHLORIDE 1500 MG/1
40 TABLET, EXTENDED RELEASE ORAL PRN
Status: DISCONTINUED | OUTPATIENT
Start: 2025-04-22 | End: 2025-04-30 | Stop reason: HOSPADM

## 2025-04-22 RX ORDER — MAGNESIUM SULFATE IN WATER 40 MG/ML
2000 INJECTION, SOLUTION INTRAVENOUS PRN
Status: DISCONTINUED | OUTPATIENT
Start: 2025-04-22 | End: 2025-04-30 | Stop reason: HOSPADM

## 2025-04-22 RX ORDER — DIPHENHYDRAMINE HCL 25 MG
50 TABLET ORAL EVERY 6 HOURS PRN
Status: DISCONTINUED | OUTPATIENT
Start: 2025-04-22 | End: 2025-04-30 | Stop reason: HOSPADM

## 2025-04-22 RX ORDER — ALBUTEROL SULFATE 0.83 MG/ML
2.5 SOLUTION RESPIRATORY (INHALATION) EVERY 6 HOURS PRN
Status: DISCONTINUED | OUTPATIENT
Start: 2025-04-22 | End: 2025-04-30 | Stop reason: HOSPADM

## 2025-04-22 RX ORDER — POTASSIUM CHLORIDE 7.45 MG/ML
10 INJECTION INTRAVENOUS PRN
Status: DISCONTINUED | OUTPATIENT
Start: 2025-04-22 | End: 2025-04-30 | Stop reason: HOSPADM

## 2025-04-22 RX ORDER — METOPROLOL SUCCINATE 25 MG/1
25 TABLET, EXTENDED RELEASE ORAL 2 TIMES DAILY
Status: DISCONTINUED | OUTPATIENT
Start: 2025-04-22 | End: 2025-04-27

## 2025-04-22 RX ORDER — ONDANSETRON 4 MG/1
4 TABLET, ORALLY DISINTEGRATING ORAL EVERY 8 HOURS PRN
Status: DISCONTINUED | OUTPATIENT
Start: 2025-04-22 | End: 2025-04-30 | Stop reason: HOSPADM

## 2025-04-22 RX ORDER — SODIUM CHLORIDE 0.9 % (FLUSH) 0.9 %
5-40 SYRINGE (ML) INJECTION PRN
Status: DISCONTINUED | OUTPATIENT
Start: 2025-04-22 | End: 2025-04-30 | Stop reason: HOSPADM

## 2025-04-22 RX ORDER — PANTOPRAZOLE SODIUM 40 MG/1
40 TABLET, DELAYED RELEASE ORAL 2 TIMES DAILY
Status: DISCONTINUED | OUTPATIENT
Start: 2025-04-22 | End: 2025-04-30 | Stop reason: HOSPADM

## 2025-04-22 RX ORDER — GLUCOSAM/CHONDRO/HERB 149/HYAL 750-100 MG
2 TABLET ORAL DAILY
Status: DISCONTINUED | OUTPATIENT
Start: 2025-04-22 | End: 2025-04-22 | Stop reason: CLARIF

## 2025-04-22 RX ORDER — ONDANSETRON 2 MG/ML
4 INJECTION INTRAMUSCULAR; INTRAVENOUS EVERY 6 HOURS PRN
Status: DISCONTINUED | OUTPATIENT
Start: 2025-04-22 | End: 2025-04-30 | Stop reason: HOSPADM

## 2025-04-22 RX ORDER — ALBUTEROL SULFATE 90 UG/1
2 INHALANT RESPIRATORY (INHALATION) EVERY 6 HOURS PRN
Status: DISCONTINUED | OUTPATIENT
Start: 2025-04-22 | End: 2025-04-22 | Stop reason: CLARIF

## 2025-04-22 RX ORDER — SODIUM CHLORIDE 9 MG/ML
INJECTION, SOLUTION INTRAVENOUS CONTINUOUS
Status: ACTIVE | OUTPATIENT
Start: 2025-04-22 | End: 2025-04-23

## 2025-04-22 RX ORDER — DOXAZOSIN 1 MG/1
4 TABLET ORAL DAILY
Status: DISCONTINUED | OUTPATIENT
Start: 2025-04-22 | End: 2025-04-23 | Stop reason: SDUPTHER

## 2025-04-22 RX ORDER — IOPAMIDOL 755 MG/ML
75 INJECTION, SOLUTION INTRAVASCULAR
Status: COMPLETED | OUTPATIENT
Start: 2025-04-22 | End: 2025-04-22

## 2025-04-22 RX ORDER — INSULIN LISPRO 100 [IU]/ML
0-4 INJECTION, SOLUTION INTRAVENOUS; SUBCUTANEOUS
Status: DISCONTINUED | OUTPATIENT
Start: 2025-04-22 | End: 2025-04-30 | Stop reason: HOSPADM

## 2025-04-22 RX ORDER — POLYETHYLENE GLYCOL 3350 17 G/17G
17 POWDER, FOR SOLUTION ORAL DAILY PRN
Status: DISCONTINUED | OUTPATIENT
Start: 2025-04-22 | End: 2025-04-30 | Stop reason: HOSPADM

## 2025-04-22 RX ORDER — ACETAMINOPHEN 650 MG/1
650 SUPPOSITORY RECTAL EVERY 6 HOURS PRN
Status: DISCONTINUED | OUTPATIENT
Start: 2025-04-22 | End: 2025-04-30 | Stop reason: HOSPADM

## 2025-04-22 RX ORDER — ALBUMIN (HUMAN) 12.5 G/50ML
50 SOLUTION INTRAVENOUS ONCE
Status: COMPLETED | OUTPATIENT
Start: 2025-04-22 | End: 2025-04-23

## 2025-04-22 RX ORDER — FUROSEMIDE 10 MG/ML
40 INJECTION INTRAMUSCULAR; INTRAVENOUS DAILY
Status: DISCONTINUED | OUTPATIENT
Start: 2025-04-22 | End: 2025-04-30 | Stop reason: HOSPADM

## 2025-04-22 RX ORDER — SODIUM CHLORIDE 9 MG/ML
INJECTION, SOLUTION INTRAVENOUS PRN
Status: DISCONTINUED | OUTPATIENT
Start: 2025-04-22 | End: 2025-04-30 | Stop reason: HOSPADM

## 2025-04-22 RX ORDER — OXYMETAZOLINE HYDROCHLORIDE 0.05 G/100ML
2 SPRAY NASAL 2 TIMES DAILY
Status: DISPENSED | OUTPATIENT
Start: 2025-04-22 | End: 2025-04-25

## 2025-04-22 RX ORDER — ACETAMINOPHEN 325 MG/1
650 TABLET ORAL EVERY 6 HOURS PRN
Status: DISCONTINUED | OUTPATIENT
Start: 2025-04-22 | End: 2025-04-30 | Stop reason: HOSPADM

## 2025-04-22 RX ADMIN — SODIUM CHLORIDE 500 ML: 0.9 INJECTION, SOLUTION INTRAVENOUS at 11:55

## 2025-04-22 RX ADMIN — SODIUM CHLORIDE: 0.9 INJECTION, SOLUTION INTRAVENOUS at 22:40

## 2025-04-22 RX ADMIN — PANTOPRAZOLE SODIUM 40 MG: 40 TABLET, DELAYED RELEASE ORAL at 23:11

## 2025-04-22 RX ADMIN — ALBUMIN (HUMAN) 50 G: 0.25 INJECTION, SOLUTION INTRAVENOUS at 23:23

## 2025-04-22 RX ADMIN — IOPAMIDOL 75 ML: 755 INJECTION, SOLUTION INTRAVENOUS at 13:28

## 2025-04-22 RX ADMIN — METOPROLOL SUCCINATE 25 MG: 25 TABLET, EXTENDED RELEASE ORAL at 23:07

## 2025-04-22 NOTE — ED PROVIDER NOTES
note:    CT ABDOMEN PELVIS W IV CONTRAST Additional Contrast? None   Final Result   1. Stable large right pleural effusion and small left pleural effusion with   compensatory atelectatic changes at the lung bases bilaterally.   2. Small pericardial effusion.   3. Moderate amount of ascites identified within the abdomen.   4. Extensive omental caking stable and unchanged when compared to the prior   study consistent with metastatic disease.   5. Stable small lymph nodes identified scattered throughout the   retroperitoneum and celiac axis concerning for metastatic process.   6. Unchanged abnormal appearance of the bladder with wall thickening   identified along the right lateral aspect. Findings concerning for underlying   malignancy.  Cystography recommended for further evaluation.   7. Diverticulosis with no evidence of diverticulitis.   8. Mild anasarca seen within the soft tissues.         XR CHEST (2 VW)   Final Result   1. Increasing hazy density in the right mid and lower lung. Findings may be   due to atelectasis, pneumonia, or asymmetric edema.   2. Cardiomegaly.           XR CHEST 1 VIEW  Result Date: 4/18/2025  EXAMINATION: ONE XRAY VIEW OF THE CHEST 4/18/2025 3:34 pm COMPARISON: None. HISTORY: ORDERING SYSTEM PROVIDED HISTORY: post procedural pneumothorax TECHNOLOGIST PROVIDED HISTORY: Reason for exam:->post procedural pneumothorax FINDINGS: The lungs are without acute focal process.  Right pleural effusion decreased compared to 12:09 p.m..  No small left pleural effusion.  Stable positioning of left-sided port a catheter pneumothorax.  The cardiomediastinal silhouette is without acute process. The osseous structures are without acute process.     1. Decreased right pleural effusion. 2. Small left pleural effusion. 3. Stable positioning of left-sided port a catheter.     IR GUIDED THORACENTESIS PLEURAL  Result Date: 4/18/2025  PROCEDURE: ULTRASOUNDGUIDED RIGHT THORACENTESIS 4/18/2025 HISTORY: ORDERING  0-4 Units (has no administration in time range)   sodium chloride 0.9 % bolus 500 mL (0 mLs IntraVENous Stopped 4/22/25 1621)   iopamidol (ISOVUE-370) 76 % injection 75 mL (75 mLs IntraVENous Given 4/22/25 1328)             Medical Decision Making/Differential Diagnosis:    CC/HPI Summary, Social Determinants of health, Records Reviewed, DDx, testing done/not done, ED Course, Reassessment, disposition considerations/shared decision making with patient, consults, disposition:      ED Course as of 04/22/25 2032 Tue Apr 22, 2025   1213 EKG:  This EKG is signed and interpreted by me.    Rate: 126  Rhythm: Sinus  Interpretation: non-specific EKG, no ST elevation, left axis deviation, PVCs, right bundle branch block, inferior infarct, changes  Comparison: changes compared to previous EKG   [SS]   1609 Discussed the case with Dr. Lucero, hospitalist, who accepts the patient for admission  [BK]      ED Course User Index  [BK] Samantha Schwab DO  [SS] Zulema Garcia MD        74-year-old male presenting to the emergency department for generalized weakness and fatigue.  The differential diagnosis includes, but is not limited to dehydration, electrolyte abnormalities, acute coronary syndrome, anemia, spontaneous bacterial peritonitis.  Patient presenting to the emergency department tachycardic with a soft blood pressure, but with otherwise stable vitals. He was given fluids upon arrival.  EKG was not concerning for ischemia and troponins were trended with a negative delta.  The patient was found to be hyponatremic.  CT of the abdomen showed bilateral pleural effusions as well as ascites.  There was discussion about the patient getting a permanent drain placed for his ascites as the patient continues to have ascitic fluid buildup, so the patient will be admitted to the hospital for further management of his ascites and potential thoracentesis for his pleural effusions.  The case was discussed with the hospitalist who  accepts the patient for admission.  The patient is stable for admission.      CONSULTS: (Who and What was discussed)  IP CONSULT TO SOCIAL WORK        FINAL IMPRESSION      1. Hyponatremia    2. Chronic bilateral pleural effusions    3. Pericardial effusion    4. Malignant ascites (HCC)    5. Generalized weakness          DISPOSITION/PLAN     DISPOSITION Admitted 04/22/2025 07:56:39 PM      4/22/25, 10:57 AM EDT.    Samantha Schwab DO PGY-1  Emergency Medicine    PATIENT REFERRED TO:  No follow-up provider specified.    DISCHARGE MEDICATIONS:  New Prescriptions    No medications on file       DISCONTINUED MEDICATIONS:  Discontinued Medications    No medications on file              (Please note that portions of this note were completed with a voice recognition program.  Efforts were made to edit the dictations but occasionally words are mis-transcribed.)    Samantha Schwab DO (electronically signed)

## 2025-04-22 NOTE — CARE COORDINATION
4/22/25 SS Note: Pt w/fatigue & drainage from incision. Pt had paracentesis last Friday and has been leaking from site since. Pt is recent admission 4/10- 4/13 for Pleural effusion. Pt does follow w/[facility] for stage IV esophageal cancer. SW received call () from [facility] that she received an order for services via [facility]. Pt also active w/[facility] for PT & RN. SW updated Samantha Schwab DO.     SW spoke to pt about his dx & although he notes he has Stage IV CA, he states: \"I'm not ready to give up yet\". Pt wants to be full code & be given a chance. He is not sure if he wants a PleurX Catheter. SW provided pt w/hospice resource & info as well as Palliative Care. Pt notes the last few days have been rough & he is hopeful he can gain his strength back. He wants to come for his weekly paracentesis if he can. He noted he has a walker he uses @ the house. His wife had to leave as she is receiving radiation for her medical condition. She drives & supports pt. Pt to be readmitted for care. CT does note Stable large right pleural effusion & small left pleural effusion w/compensatory atelectatic changes at the lung bases bilaterally. Also, Small pericardial effusion, Moderate amount of ascites identified within the abdomen & Stable small lymph nodes identified scattered throughout the retroperitoneum and celiac axis concerning for metastatic process.  Electronically signed by LORI Spears on 4/22/2025 at 4:13 PM

## 2025-04-22 NOTE — CARE COORDINATION
Case Management Assessment  Initial Evaluation    Date/Time of Evaluation: 4/22/2025 4:20 PM  Assessment Completed by: LORI Spears    If patient is discharged prior to next notation, then this note serves as note for discharge by case management.    Patient Name: Herb Rand                   YOB: 1950  Diagnosis: Ascites, malignant (HCC) [R18.0]                   Date / Time: 4/22/2025 10:21 AM    Patient Admission Status: Inpatient   Readmission Risk (Low < 19, Mod (19-27), High > 27): Readmission Risk Score: 15.9    Current PCP: Gabe Goodwin MD  PCP verified by CM? Yes    Chart Reviewed: Yes      History Provided by: Patient, Medical Record  Patient Orientation: Alert and Oriented, Person, Place, Situation, Self    Patient Cognition: Alert    Hospitalization in the last 30 days (Readmission):  Yes   Readmission Assessment  Number of Days since last admission?: 8-30 days  Previous Disposition: Home with Home Health  Who is being Interviewed: Caregiver  What was the patient's/caregiver's perception as to why they think they needed to return back to the hospital?: Other (Comment) (filling up with fluid- sent in from [facility])  Did you visit your Primary Care Physician after you left the hospital, before you returned this time?: No  Why weren't you able to visit your PCP?: Did not have an appointment  Did you see a specialist, such as Cardiac, Pulmonary, Orthopedic Physician, etc. after you left the hospital?: Yes  Who advised the patient to return to the hospital?: Other (Comment) ([facility])  Does the patient report anything that got in the way of taking their medications?: No  In our efforts to provide the best possible care to you and others like you, can you think of anything that we could have done to help you after you left the hospital the first time, so that you might not have needed to return so soon?: Other (Comment) (nothing)      Advance Directives:      Code  Status: Prior   Patient's Primary Decision Maker is: Legal Next of Kin    Primary Decision Maker: An Rand - Spouse - [phone]    Discharge Planning:    Patient lives with:   Type of Home:    Primary Care Giver: Spouse  Patient Support Systems include: Spouse/Significant Other, Home Care Staff   Current Financial resources:    Current community resources:    Current services prior to admission:              Current DME:              Type of Home Care services:       ADLS  Prior functional level: Assistance with the following:, Shopping, Housework, Cooking  Current functional level: Assistance with the following:, Shopping, Housework, Cooking    PT AM-PAC:   /24  OT AM-PAC:   /24    Family can provide assistance at DC: Yes  Would you like Case Management to discuss the discharge plan with any other family members/significant others, and if so, who? Yes (wife -An)  Plans to Return to Present Housing: Unknown at present  Other Identified Issues/Barriers to RETURNING to current housing: none  Potential Assistance needed at discharge:              Potential DME:    Patient expects to discharge to:    Plan for transportation at discharge:      Financial    Payor: MEDICARE / Plan: MEDICARE PART A AND B / Product Type: *No Product type* /         Potential assistance Purchasing Medications:    Meds-to-Beds request:        [facility] [address] [facility] [phone] - F [phone]  [address] [place] [address]  Phone: [phone] Fax: [phone]    [address], [place] - [address] [facility] - P [phone] - F [phone]  [address] [facility] [address]  Phone: [phone] Fax: [phone]      Notes:    Factors facilitating achievement of predicted outcomes: Family support, Cooperative, Pleasant, and Has needed Durable Medical Equipment at home    Barriers to discharge: none    Additional Case

## 2025-04-22 NOTE — ACP (ADVANCE CARE PLANNING)
Advance Care Planning     Advance Care Planning Activator (Inpatient)  Conversation Note      Date of ACP Conversation: 4/22/2025     Conversation Conducted with: Patient with Decision Making Capacity    ACP Activator: LORI Spears      Health Care Decision Maker:     Current Designated Health Care Decision Maker:     Primary Decision Maker: An Rand - [employer] - [phone]  Click here to complete Healthcare Decision Makers including section of the Healthcare Decision Maker Relationship (ie \"Primary\")  Today we documented Decision Maker(s) consistent with Legal Next of Kin hierarchy.    Care Preferences    Ventilation:  \"If you were in your present state of health and suddenly became very ill and were unable to breathe on your own, what would your preference be about the use of a ventilator (breathing machine) if it were available to you?\"      Would the patient desire the use of ventilator (breathing machine)?: yes    \"If your health worsens and it becomes clear that your chance of recovery is unlikely, what would your preference be about the use of a ventilator (breathing machine) if it were available to you?\"     Would the patient desire the use of ventilator (breathing machine)?: No      Resuscitation  \"CPR works best to restart the heart when there is a sudden event, like a heart attack, in someone who is otherwise healthy. Unfortunately, CPR does not typically restart the heart for people who have serious health conditions or who are very sick.\"    \"In the event your heart stopped as a result of an underlying serious health condition, would you want attempts to be made to restart your heart (answer \"yes\" for attempt to resuscitate) or would you prefer a natural death (answer \"no\" for do not attempt to resuscitate)?\" yes       [] Yes   [x] No   Educated Patient / Decision Maker regarding differences between Advance Directives and portable DNR orders.    Length of ACP Conversation in minutes:

## 2025-04-23 LAB
ANION GAP SERPL CALCULATED.3IONS-SCNC: 15 MMOL/L (ref 7–16)
BASOPHILS # BLD: 0 K/UL (ref 0–0.2)
BASOPHILS NFR BLD: 0 % (ref 0–2)
BUN SERPL-MCNC: 54 MG/DL (ref 6–23)
CALCIUM SERPL-MCNC: 8.7 MG/DL (ref 8.6–10.2)
CHLORIDE SERPL-SCNC: 94 MMOL/L (ref 98–107)
CHLORIDE UR-SCNC: <20 MMOL/L
CO2 SERPL-SCNC: 20 MMOL/L (ref 22–29)
CREAT SERPL-MCNC: 1.6 MG/DL (ref 0.7–1.2)
CREAT UR-MCNC: 224.4 MG/DL (ref 40–278)
EKG ATRIAL RATE: 126 BPM
EKG P AXIS: 70 DEGREES
EKG P-R INTERVAL: 128 MS
EKG Q-T INTERVAL: 324 MS
EKG QRS DURATION: 126 MS
EKG QTC CALCULATION (BAZETT): 469 MS
EKG R AXIS: -80 DEGREES
EKG T AXIS: 58 DEGREES
EKG VENTRICULAR RATE: 126 BPM
EOSINOPHIL # BLD: 0 K/UL (ref 0.05–0.5)
EOSINOPHILS RELATIVE PERCENT: 0 % (ref 0–6)
ERYTHROCYTE [DISTWIDTH] IN BLOOD BY AUTOMATED COUNT: 15.1 % (ref 11.5–15)
GFR, ESTIMATED: 47 ML/MIN/1.73M2
GLUCOSE BLD-MCNC: 147 MG/DL (ref 74–99)
GLUCOSE BLD-MCNC: 170 MG/DL (ref 74–99)
GLUCOSE BLD-MCNC: 172 MG/DL (ref 74–99)
GLUCOSE BLD-MCNC: 205 MG/DL (ref 74–99)
GLUCOSE BLD-MCNC: 211 MG/DL (ref 74–99)
GLUCOSE SERPL-MCNC: 186 MG/DL (ref 74–99)
HCT VFR BLD AUTO: 33.6 % (ref 37–54)
HGB BLD-MCNC: 10.8 G/DL (ref 12.5–16.5)
LYMPHOCYTES NFR BLD: 0.27 K/UL (ref 1.5–4)
LYMPHOCYTES RELATIVE PERCENT: 3 % (ref 20–42)
MCH RBC QN AUTO: 30.4 PG (ref 26–35)
MCHC RBC AUTO-ENTMCNC: 32.1 G/DL (ref 32–34.5)
MCV RBC AUTO: 94.6 FL (ref 80–99.9)
MONOCYTES NFR BLD: 0.36 K/UL (ref 0.1–0.95)
MONOCYTES NFR BLD: 4 % (ref 2–12)
NEUTROPHILS NFR BLD: 94 % (ref 43–80)
NEUTS SEG NFR BLD: 9.47 K/UL (ref 1.8–7.3)
PLATELET # BLD AUTO: 154 K/UL (ref 130–450)
PMV BLD AUTO: 8.5 FL (ref 7–12)
POTASSIUM SERPL-SCNC: 4.6 MMOL/L (ref 3.5–5)
POTASSIUM, UR: 72.2 MMOL/L
RBC # BLD AUTO: 3.55 M/UL (ref 3.8–5.8)
RBC # BLD: NORMAL 10*6/UL
SODIUM SERPL-SCNC: 129 MMOL/L (ref 132–146)
SODIUM UR-SCNC: <20 MMOL/L
UUN UR-MCNC: 913 MG/DL (ref 800–1666)
WBC OTHER # BLD: 10.1 K/UL (ref 4.5–11.5)

## 2025-04-23 PROCEDURE — 6370000000 HC RX 637 (ALT 250 FOR IP): Performed by: INTERNAL MEDICINE

## 2025-04-23 PROCEDURE — 6360000002 HC RX W HCPCS: Performed by: INTERNAL MEDICINE

## 2025-04-23 PROCEDURE — 84300 ASSAY OF URINE SODIUM: CPT

## 2025-04-23 PROCEDURE — 82962 GLUCOSE BLOOD TEST: CPT

## 2025-04-23 PROCEDURE — 84133 ASSAY OF URINE POTASSIUM: CPT

## 2025-04-23 PROCEDURE — 82436 ASSAY OF URINE CHLORIDE: CPT

## 2025-04-23 PROCEDURE — 80048 BASIC METABOLIC PNL TOTAL CA: CPT

## 2025-04-23 PROCEDURE — 93010 ELECTROCARDIOGRAM REPORT: CPT | Performed by: INTERNAL MEDICINE

## 2025-04-23 PROCEDURE — 84540 ASSAY OF URINE/UREA-N: CPT

## 2025-04-23 PROCEDURE — 85025 COMPLETE CBC W/AUTO DIFF WBC: CPT

## 2025-04-23 PROCEDURE — 6370000000 HC RX 637 (ALT 250 FOR IP)

## 2025-04-23 PROCEDURE — 82570 ASSAY OF URINE CREATININE: CPT

## 2025-04-23 PROCEDURE — 2060000000 HC ICU INTERMEDIATE R&B

## 2025-04-23 RX ORDER — OXYCODONE HYDROCHLORIDE 5 MG/1
5 TABLET ORAL 3 TIMES DAILY PRN
Refills: 0 | Status: DISCONTINUED | OUTPATIENT
Start: 2025-04-23 | End: 2025-04-30 | Stop reason: HOSPADM

## 2025-04-23 RX ORDER — TERAZOSIN 5 MG/1
5 CAPSULE ORAL NIGHTLY
Status: DISCONTINUED | OUTPATIENT
Start: 2025-04-23 | End: 2025-04-30 | Stop reason: HOSPADM

## 2025-04-23 RX ADMIN — INSULIN LISPRO 2 UNITS: 100 INJECTION, SOLUTION INTRAVENOUS; SUBCUTANEOUS at 12:42

## 2025-04-23 RX ADMIN — METOPROLOL SUCCINATE 25 MG: 25 TABLET, EXTENDED RELEASE ORAL at 20:57

## 2025-04-23 RX ADMIN — TERAZOSIN 5 MG: 5 CAPSULE ORAL at 02:19

## 2025-04-23 RX ADMIN — TERAZOSIN 5 MG: 5 CAPSULE ORAL at 20:56

## 2025-04-23 RX ADMIN — Medication 2 SPRAY: at 08:51

## 2025-04-23 RX ADMIN — METOPROLOL SUCCINATE 25 MG: 25 TABLET, EXTENDED RELEASE ORAL at 08:34

## 2025-04-23 RX ADMIN — OXYCODONE 5 MG: 5 TABLET ORAL at 22:19

## 2025-04-23 RX ADMIN — OXYCODONE 5 MG: 5 TABLET ORAL at 16:13

## 2025-04-23 RX ADMIN — INSULIN LISPRO 1 UNITS: 100 INJECTION, SOLUTION INTRAVENOUS; SUBCUTANEOUS at 17:26

## 2025-04-23 RX ADMIN — PANTOPRAZOLE SODIUM 40 MG: 40 TABLET, DELAYED RELEASE ORAL at 20:57

## 2025-04-23 RX ADMIN — PANTOPRAZOLE SODIUM 40 MG: 40 TABLET, DELAYED RELEASE ORAL at 08:34

## 2025-04-23 RX ADMIN — FUROSEMIDE 40 MG: 10 INJECTION, SOLUTION INTRAMUSCULAR; INTRAVENOUS at 02:30

## 2025-04-23 ASSESSMENT — PAIN SCALES - GENERAL
PAINLEVEL_OUTOF10: 0
PAINLEVEL_OUTOF10: 7
PAINLEVEL_OUTOF10: 6
PAINLEVEL_OUTOF10: 0
PAINLEVEL_OUTOF10: 0

## 2025-04-23 ASSESSMENT — PAIN DESCRIPTION - DESCRIPTORS
DESCRIPTORS: SHARP
DESCRIPTORS: DISCOMFORT

## 2025-04-23 ASSESSMENT — PAIN DESCRIPTION - ORIENTATION: ORIENTATION: RIGHT

## 2025-04-23 ASSESSMENT — PAIN DESCRIPTION - LOCATION
LOCATION: ABDOMEN;SHOULDER
LOCATION: COCCYX

## 2025-04-23 NOTE — PROGRESS NOTES
Subjective:    The patient is awake and alert, seen and examined while in the ER. He states his pain is currently 3/10, denies sob. Denies n/v/d. We discussed plan for PleurX cath placement as he recently discussed with Dr. Sheppard at his follow up yesterday. He also expressed wanting hospice to be consulted so he and his wife can discuss the hospice benefits as he has decided to pursue comfort measures and symptom management at home.     Objective:    BP 91/65   Pulse (!) 127   Temp 98 °F (36.7 °C) (Oral)   Resp 18   SpO2 94%     General: Alert, oriented, no acute distress  HEENT: No thrush or mucositis, EOMI, PERRLA  Heart:  RRR, no murmurs, gallops, or rubs.  Lungs:  Diminished, no wheeze, rales or rhonchi  Abd: BS present, tender to light palpation, distended-ascites, no masses  Extrem:  No clubbing, cyanosis, or edema  Lymphatics: No palpable adenopathy in cervical and supraclavicular regions  Skin: Intact, no petechia or purpura    CBC with Differential:    Lab Results   Component Value Date/Time    WBC 10.1 04/23/2025 05:55 AM    RBC 3.55 04/23/2025 05:55 AM    HGB 10.8 04/23/2025 05:55 AM    HCT 33.6 04/23/2025 05:55 AM     04/23/2025 05:55 AM    MCV 94.6 04/23/2025 05:55 AM    MCH 30.4 04/23/2025 05:55 AM    MCHC 32.1 04/23/2025 05:55 AM    RDW 15.1 04/23/2025 05:55 AM    NRBC 1 04/13/2025 08:09 AM    METASPCT 4 04/18/2025 11:33 AM    LYMPHOPCT 3 04/23/2025 05:55 AM    MONOPCT 4 04/23/2025 05:55 AM    MYELOPCT 1 04/18/2025 11:33 AM    EOSPCT 0 04/23/2025 05:55 AM    BASOPCT 0 04/23/2025 05:55 AM    MONOSABS 0.36 04/23/2025 05:55 AM    LYMPHSABS 0.27 04/23/2025 05:55 AM    EOSABS 0.00 04/23/2025 05:55 AM    BASOSABS 0.00 04/23/2025 05:55 AM     CMP:    Lab Results   Component Value Date/Time     04/23/2025 05:55 AM    K 4.6 04/23/2025 05:55 AM    K 3.1 03/24/2022 01:19 PM    CL 94 04/23/2025 05:55 AM    CO2 20 04/23/2025 05:55 AM    BUN 54 04/23/2025 05:55 AM    CREATININE 1.6 04/23/2025  MD Zack, 2 spray at 04/23/25 0851    pantoprazole (PROTONIX) tablet 40 mg, 40 mg, Oral, BID, Zack Lucero MD, 40 mg at 04/23/25 0834    furosemide (LASIX) injection 40 mg, 40 mg, IntraVENous, Daily, Zack Lucero MD, 40 mg at 04/23/25 0230    albuterol (PROVENTIL) (2.5 MG/3ML) 0.083% nebulizer solution 2.5 mg, 2.5 mg, Nebulization, Q6H PRN, Zack Lucero MD    insulin lispro (HUMALOG,ADMELOG) injection vial 0-4 Units, 0-4 Units, SubCUTAneous, 4x Daily AC & HS, Zack Lucero MD    Current Outpatient Medications:     empagliflozin (JARDIANCE) 25 MG tablet, Take 1 tablet by mouth daily, Disp: , Rfl:     potassium chloride (K-TAB) 20 MEQ TBCR extended release tablet, Take 1 tablet by mouth daily, Disp: , Rfl:     furosemide (LASIX) 20 MG tablet, Take 1 tablet by mouth daily, Disp: 60 tablet, Rfl: 3    pantoprazole (PROTONIX) 40 MG tablet, Take 1 tablet by mouth 2 times daily, Disp: , Rfl:     Misc. Devices (CPAP MACHINE) MISC, by Does not apply route nightly, Disp: , Rfl:     Multiple Vitamins-Minerals (THERAPEUTIC MULTIVITAMIN-MINERALS) tablet, Take 1 tablet by mouth daily, Disp: , Rfl:     prasugrel (EFFIENT) 10 MG TABS, Take 1 tablet by mouth daily, Disp: , Rfl:     metoprolol succinate (TOPROL XL) 25 MG extended release tablet, Take 1 tablet by mouth 2 times daily, Disp: , Rfl:     terazosin (HYTRIN) 5 MG capsule, Take 1 capsule by mouth nightly, Disp: , Rfl:     vitamin C (ASCORBIC ACID) 500 MG tablet, Take 1 tablet by mouth daily, Disp: , Rfl:     Misc Natural Products (GLUCOSAMINE CHOND CMP ADVANCED) TABS, Take 2 tablets by mouth daily, Disp: , Rfl:     atorvastatin (LIPITOR) 80 MG tablet, Take 1 tablet by mouth nightly, Disp: 30 tablet, Rfl: 3    albuterol sulfate HFA (VENTOLIN HFA) 108 (90 Base) MCG/ACT inhaler, Inhale 2 puffs into the lungs every 6 hours as needed for Wheezing or Shortness of Breath, Disp: , Rfl:     oxymetazoline (AFRIN) 0.05 % nasal spray, 2 sprays by Nasal route at bedtime, Disp: ,

## 2025-04-23 NOTE — CARE COORDINATION
4/23/25  SS note: SW Spoke to pt in ED #14 & then his wife via phone. They want an informational mtg /[facility] today. Pt states he is aware his CA has progressed & wants to know of options. He does want Pleur X catheter. Pt's wife also noted pt's son from previous marriage is also to attend. SW informed [facility] & she will schedule mtg.  Electronically signed by LORI Spears on 4/23/2025 at 11:04 AM

## 2025-04-23 NOTE — PROGRESS NOTES
Pharmacy Note    Herb Rand was ordered Glucosamine Chond Cmp Advanced .  As per the [facility] Formulary Committee Policy, herbals and certain dietary supplements will be discontinued.  The herbal or dietary supplement may be continued after discharge from the hospital.

## 2025-04-23 NOTE — PROGRESS NOTES
4 Eyes Skin Assessment     NAME:  Herb Rand  YOB: 1950  MEDICAL RECORD NUMBER:  [id number]    The patient is being assessed for  Admission    I agree that at least one RN has performed a thorough Head to Toe Skin Assessment on the patient. ALL assessment sites listed below have been assessed.      Areas assessed by both nurses:    Head, Face, Ears, Shoulders, Back, Chest, Arms, Elbows, Hands, Sacrum. Buttock, Coccyx, Ischium, Legs. Feet and Heels, and Under Medical Devices Wound- intergluteal cleft; Incision/puncture/prev paracentesis site- RUQ; skin tears x4- RUQ/librado paracentesis incision/puncture site.         Does the Patient have a Wound? Yes wound(s) were present on assessment. LDA wound assessment was Initiated and completed by RN       Manuel Prevention initiated by RN: Yes  Wound Care Orders initiated by RN: Yes    Pressure Injury (Stage 3,4, Unstageable, DTI, NWPT, and Complex wounds) if present, place Wound referral order by RN under : No    New Ostomies, if present place, Ostomy referral order under : No     Nurse 1 eSignature: Electronically signed by DAMASO ESTEVEZ RN on 4/23/25 at 6:34 PM EDT    **SHARE this note so that the co-signing nurse can place an eSignature**    Nurse 2 eSignature: Electronically signed by Natalie Devi RN on 4/23/25 at 6:38 PM EDT

## 2025-04-23 NOTE — CONSULTS
Nephrology Consult Note            Patient's Name: Herb Rand  2:24 PM  4/23/2025         Reason for Consult: Hyponatremia  Requesting Physician:  Zack Lucero MD    Chief Complaint:   Fatigue and leakage from recent site of recent paracentesis    History Obtained From:  patient and past medical records    History of Present Ilness:        Patient is being seen in consultation at the request of Zack Denton MD for hyponatremia  Patient Herb Rand is a 74 y.o. male with history of metastatic  esophageal adenocarcinoma and who has been on palliative chemotherapy.  Patient has a history of malignant ascites and has required paracentesis.  Patient was seen by Dr. Peter Quintana in our practice during his recent hospitalization earlier this month.    Patient is now readmitted with chief complaints of fatigue and leakage from the site of his paracentesis last Friday.  Upon presentation the patient was found to have a serum sodium of 126 mmol/L with a serum sodium 129 mmol/L this morning.  He does have history of borderline hyponatremia and his serum sodium for 5 days ago on April 18, 2025 was 129 mmol/L.  His serum creatinine has gone up to 1.6 mg/dL.  He has been started on gentle hydration with normal saline at 75 cc an hour.  But he is also on intravenous furosemide 40 mg daily.    Past Medical History:   Diagnosis Date    Arthritis     lower back    Asthma     Atrial fibrillation (HCC)     Diabetes mellitus (HCC)     diet controlled    H/O cardiovascular stress test 06/26/2020    Lexiscan    Hypertension     Sleep apnea        Past Surgical History:   Procedure Laterality Date    CARDIAC PROCEDURE N/A 9/3/2024    Left heart cath / coronary angiography performed by Edwin Smith MD at [facility] CARDIAC CATH/IR    CHOLECYSTECTOMY  1995    PROSTATECTOMY N/A 10/12/2020    LAPAROSCOPIC ROBOTIC ASSISTED BLADDER DIVERTICULECTOMY WITH FLEXIBLE  CYSTOSCOPY performed by Vic Cadet MD at [facility] OR  ABDOMEN PELVIS W IV CONTRAST Additional Contrast? None   Final Result   1. Stable large right pleural effusion and small left pleural effusion with   compensatory atelectatic changes at the lung bases bilaterally.   2. Small pericardial effusion.   3. Moderate amount of ascites identified within the abdomen.   4. Extensive omental caking stable and unchanged when compared to the prior   study consistent with metastatic disease.   5. Stable small lymph nodes identified scattered throughout the   retroperitoneum and celiac axis concerning for metastatic process.   6. Unchanged abnormal appearance of the bladder with wall thickening   identified along the right lateral aspect. Findings concerning for underlying   malignancy.  Cystography recommended for further evaluation.   7. Diverticulosis with no evidence of diverticulitis.   8. Mild anasarca seen within the soft tissues.         XR CHEST (2 VW)   Final Result   1. Increasing hazy density in the right mid and lower lung. Findings may be   due to atelectasis, pneumonia, or asymmetric edema.   2. Cardiomegaly.         IR INTERVENTIONAL RADIOLOGY PROCEDURE REQUEST    (Results Pending)                  LAB DATA       BMP, Mg, Phos    Lab Results   Component Value Date    CREATININE 1.6 (H) 04/23/2025    CREATININE 1.5 (H) 04/22/2025    CREATININE 1.3 (H) 04/18/2025    CREATININE 1.2 04/13/2025    CREATININE 1.4 (H) 04/12/2025    CREATININE 1.6 (H) 04/12/2025    CREATININE 1.4 (H) 04/11/2025       CBC:   Recent Labs     04/22/25  1147 04/23/25  0555   WBC 10.3 10.1   HGB 11.9* 10.8*    154        No results found for: \"IRON\", \"TIBC\", \"FERRITIN\", \"IRONPERSAT\"    No results found for: \"PTHINTRAOP\"    No results found for: \"IPTH\"    No results found for: \"VITD25\"    No results found for: \"IPTH\", \"CAION\"      BMP:   Recent Labs     04/22/25  1147 04/23/25  0555   * 129*   K 5.3* 4.6   CL 96* 94*   CO2 19* 20*   BUN 50* 54*   CREATININE 1.5* 1.6*   GLUCOSE 168*

## 2025-04-23 NOTE — PROGRESS NOTES
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 male who was seen and examined today,4/23/2025, at the bedside. Patient feeling much better today although abdominal distention persist, I had has been consulted    No family present during my examination.    ROS:  General:   Denies chills, fever, malaise, night sweats or weight loss, positive for fatigue and weakness     Psychological:   Denies anxiety, disorientation or hallucinations     ENT:    Denies epistaxis, headaches, vertigo or visual changes     Cardiovascular:   Denies any chest pain, irregular heartbeats, or palpitations. No paroxysmal nocturnal dyspnea.     Respiratory:   Denies shortness of breath, coughing, sputum production, hemoptysis, or wheezing.  No orthopnea.     Gastrointestinal:   Denies nausea, vomiting, diarrhea, or constipation.  Denies any abdominal pain.  Denies change in bowel habits or stools, positive for abdominal distention and fluid     Genito-Urinary:    Denies any urgency, frequency, hematuria.  Voiding without difficulty.     Musculoskeletal:   Denies joint pain, joint stiffness, joint swelling or muscle pain     Neurology:    Denies any headache or focal neurological deficits. No weakness or paresthesia.     Derm:    Denies any rashes, ulcers, or excoriations.  Denies bruising.      Extremities:   Denies any lower extremity swelling or edema.    Physical Exam:  I/O this shift:  In: 2622.5 [P.O.:480; I.V.:861.3; IV Piggyback:1281.2]  Out: 350 [Urine:350]    Intake/Output Summary (Last 24 hours) at 4/23/2025 1201  Last data filed at 4/23/2025 1010  Gross per 24 hour   Intake 2622.46 ml

## 2025-04-23 NOTE — PROGRESS NOTES
Spiritual Health History and Assessment/Progress Note  [facility]    (P) Spiritual/Emotional Needs,  ,  ,      Name: Herb Rand MRN: [id number]    Age: 74 y.o.     Sex: male   Language: English   Roman Catholic: Non-Synagogue   Ascites, malignant (HCC)     Date: 4/23/2025                           Spiritual Assessment began in [facility] EMERGENCY DEPARTMENT        Referral/Consult From: (P) Rounding   Encounter Overview/Reason: (P) Spiritual/Emotional Needs  Service Provided For: (P) Patient    Kat, Belief, Meaning:   Patient identifies as spiritual and has beliefs or practices that help with coping during difficult times  Family/Friends No family/friends present      Importance and Influence:  Patient has spiritual/personal beliefs that influence decisions regarding their health  Family/Friends No family/friends present    Community:  Patient feels well-supported. Support system includes: Spouse/Partner and Children  Family/Friends No family/friends present    Assessment and Plan of Care:     Patient Interventions include: Facilitated expression of thoughts and feelings, Explored spiritual coping/struggle/distress, Affirmed coping skills/support systems, and Facilitated life review and/ or legacy  Herb spoke about his life and meeting his second life. He does have a strong kat belief and trust in Yayo. Prayer support provided.  Family/Friends Interventions include: No family/friends present    Patient Plan of Care: Spiritual Care available upon further referral  Family/Friends Plan of Care: No family/friends present    Electronically signed by KALLI Nicholson on 4/23/2025 at 12:59 PM

## 2025-04-23 NOTE — ED NOTES
Dressing changed. Moderate amount of light red drainage with yellow hue on the outside. Patient states the amount of drainage has improved. New dressing in place. Clean, dry, intact.

## 2025-04-24 ENCOUNTER — APPOINTMENT (OUTPATIENT)
Dept: INTERVENTIONAL RADIOLOGY/VASCULAR | Age: 75
DRG: 375 | End: 2025-04-24
Payer: MEDICARE

## 2025-04-24 PROBLEM — E44.0 MODERATE PROTEIN-CALORIE MALNUTRITION: Chronic | Status: ACTIVE | Noted: 2025-04-24

## 2025-04-24 LAB
ANION GAP SERPL CALCULATED.3IONS-SCNC: 11 MMOL/L (ref 7–16)
BUN SERPL-MCNC: 56 MG/DL (ref 6–23)
CALCIUM SERPL-MCNC: 8.3 MG/DL (ref 8.6–10.2)
CHLORIDE SERPL-SCNC: 95 MMOL/L (ref 98–107)
CO2 SERPL-SCNC: 21 MMOL/L (ref 22–29)
CREAT SERPL-MCNC: 1.5 MG/DL (ref 0.7–1.2)
GFR, ESTIMATED: 50 ML/MIN/1.73M2
GLUCOSE BLD-MCNC: 166 MG/DL (ref 74–99)
GLUCOSE BLD-MCNC: 169 MG/DL (ref 74–99)
GLUCOSE BLD-MCNC: 181 MG/DL (ref 74–99)
GLUCOSE BLD-MCNC: 194 MG/DL (ref 74–99)
GLUCOSE SERPL-MCNC: 149 MG/DL (ref 74–99)
POTASSIUM SERPL-SCNC: 4.6 MMOL/L (ref 3.5–5)
SODIUM SERPL-SCNC: 127 MMOL/L (ref 132–146)

## 2025-04-24 PROCEDURE — C1769 GUIDE WIRE: HCPCS

## 2025-04-24 PROCEDURE — 0JH83XZ INSERTION OF TUNNELED VASCULAR ACCESS DEVICE INTO ABDOMEN SUBCUTANEOUS TISSUE AND FASCIA, PERCUTANEOUS APPROACH: ICD-10-PCS | Performed by: INTERNAL MEDICINE

## 2025-04-24 PROCEDURE — 82962 GLUCOSE BLOOD TEST: CPT

## 2025-04-24 PROCEDURE — 6370000000 HC RX 637 (ALT 250 FOR IP): Performed by: INTERNAL MEDICINE

## 2025-04-24 PROCEDURE — 2060000000 HC ICU INTERMEDIATE R&B

## 2025-04-24 PROCEDURE — 6370000000 HC RX 637 (ALT 250 FOR IP)

## 2025-04-24 PROCEDURE — 49418 INSERT TUN IP CATH PERC: CPT

## 2025-04-24 PROCEDURE — C1729 CATH, DRAINAGE: HCPCS

## 2025-04-24 PROCEDURE — 36415 COLL VENOUS BLD VENIPUNCTURE: CPT

## 2025-04-24 PROCEDURE — 80048 BASIC METABOLIC PNL TOTAL CA: CPT

## 2025-04-24 PROCEDURE — 2500000003 HC RX 250 WO HCPCS: Performed by: INTERNAL MEDICINE

## 2025-04-24 PROCEDURE — 0W9G3ZZ DRAINAGE OF PERITONEAL CAVITY, PERCUTANEOUS APPROACH: ICD-10-PCS | Performed by: INTERNAL MEDICINE

## 2025-04-24 RX ADMIN — OXYCODONE 5 MG: 5 TABLET ORAL at 10:23

## 2025-04-24 RX ADMIN — METOPROLOL SUCCINATE 25 MG: 25 TABLET, EXTENDED RELEASE ORAL at 20:37

## 2025-04-24 RX ADMIN — Medication 2 SPRAY: at 10:23

## 2025-04-24 RX ADMIN — PANTOPRAZOLE SODIUM 40 MG: 40 TABLET, DELAYED RELEASE ORAL at 10:23

## 2025-04-24 RX ADMIN — INSULIN LISPRO 1 UNITS: 100 INJECTION, SOLUTION INTRAVENOUS; SUBCUTANEOUS at 13:00

## 2025-04-24 RX ADMIN — PANTOPRAZOLE SODIUM 40 MG: 40 TABLET, DELAYED RELEASE ORAL at 20:37

## 2025-04-24 RX ADMIN — SODIUM CHLORIDE, PRESERVATIVE FREE 10 ML: 5 INJECTION INTRAVENOUS at 20:37

## 2025-04-24 RX ADMIN — TERAZOSIN 5 MG: 5 CAPSULE ORAL at 20:37

## 2025-04-24 RX ADMIN — METOPROLOL SUCCINATE 25 MG: 25 TABLET, EXTENDED RELEASE ORAL at 10:23

## 2025-04-24 RX ADMIN — INSULIN LISPRO 1 UNITS: 100 INJECTION, SOLUTION INTRAVENOUS; SUBCUTANEOUS at 18:13

## 2025-04-24 ASSESSMENT — PAIN DESCRIPTION - ORIENTATION: ORIENTATION: LEFT;RIGHT;MID

## 2025-04-24 ASSESSMENT — PAIN DESCRIPTION - LOCATION: LOCATION: ABDOMEN

## 2025-04-24 ASSESSMENT — PAIN SCALES - GENERAL: PAINLEVEL_OUTOF10: 6

## 2025-04-24 NOTE — PROGRESS NOTES
Nephrology Note  Hadley Gifford MD          Patient seen and examined.  No family member is present at bedside.  Chart reviewed.  Patient is feeling about the same.  Patient is to go for abdominal Pleurx catheter insertion today.  He is less short of breath.  Appetite is good but presently patient is NPO.  No nausea or dysguesia.    Awake and alert .   In no acute distress.    Vital SignsBP 108/80   Pulse (!) 122   Temp 97.6 °F (36.4 °C) (Oral)   Resp 22   Ht 1.88 m (6' 2.02\")   Wt 131 kg (288 lb 12.8 oz)   SpO2 93%   BMI 37.06 kg/m²   24HR INTAKE/OUTPUT:    Intake/Output Summary (Last 24 hours) at 4/24/2025 1354  Last data filed at 4/24/2025 0827  Gross per 24 hour   Intake --   Output 300 ml   Net -300 ml         PHYSICAL EXAM        Neck: No JVD  Chest: Chest is symmetrical,  Lungs: Vesicular breath sounds decreased at the bases. No rales or ronchi.  Heart: Regular rate and rhythm. No S3 gallop. No  murmrur.  Abdomen: Soft, distended, dressing over site of the recent paracentesis on the right side, which is wet, non tender and normal bowel sounds.  Extremeties: +1 pitting edema bilaterally.         Current Facility-Administered Medications   Medication Dose Route Frequency Provider Last Rate Last Admin    terazosin (HYTRIN) capsule 5 mg (Patient Supplied)  5 mg Oral Nightly Zack Lucero MD   5 mg at 04/23/25 2056    oxyCODONE (ROXICODONE) immediate release tablet 5 mg  5 mg Oral TID PRN Miranda Billingsley APRN - CNP   5 mg at 04/24/25 1023    sodium chloride flush 0.9 % injection 5-40 mL  5-40 mL IntraVENous PRN Zack Lucero MD        0.9 % sodium chloride infusion   IntraVENous PRN Zack Lucero MD        potassium chloride (KLOR-CON M) extended release tablet 40 mEq  40 mEq Oral PRN Zack Lucero MD        Or    potassium bicarb-citric acid (EFFER-K) effervescent tablet 40 mEq  40 mEq Oral PRN Zack Lucero MD        Or    potassium chloride 10 mEq/100 mL IVPB (Peripheral Line)

## 2025-04-24 NOTE — PROGRESS NOTES
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 male who was seen and examined today,4/24/2025, at the bedside. Patient feeling much better today although abdominal distention persist, interventional radiology is going to place the catheter today in the peritoneum.  Discussed with patient regarding hospice he says that the family wants hospice at this time.    No family present during my examination.    ROS:  General:   Denies chills, fever, malaise, night sweats or weight loss, positive for fatigue and weakness     Psychological:   Denies anxiety, disorientation or hallucinations     ENT:    Denies epistaxis, headaches, vertigo or visual changes     Cardiovascular:   Denies any chest pain, irregular heartbeats, or palpitations. No paroxysmal nocturnal dyspnea.     Respiratory:   Denies shortness of breath, coughing, sputum production, hemoptysis, or wheezing.  No orthopnea.     Gastrointestinal:   Denies nausea, vomiting, diarrhea, or constipation.  Denies any abdominal pain.  Denies change in bowel habits or stools, positive for abdominal distention and fluid     Genito-Urinary:    Denies any urgency, frequency, hematuria.  Voiding without difficulty.     Musculoskeletal:   Denies joint pain, joint stiffness, joint swelling or muscle pain     Neurology:    Denies any headache or focal neurological deficits. No weakness or paresthesia.     Derm:    Denies any rashes, ulcers, or excoriations.  Denies bruising.    METASPCT 4 04/18/2025 11:33 AM    LYMPHOPCT 3 04/23/2025 05:55 AM    MONOPCT 4 04/23/2025 05:55 AM    MYELOPCT 1 04/18/2025 11:33 AM    EOSPCT 0 04/23/2025 05:55 AM    BASOPCT 0 04/23/2025 05:55 AM    MONOSABS 0.36 04/23/2025 05:55 AM    LYMPHSABS 0.27 04/23/2025 05:55 AM    EOSABS 0.00 04/23/2025 05:55 AM    BASOSABS 0.00 04/23/2025 05:55 AM     CMP:    Lab Results   Component Value Date/Time     04/24/2025 07:09 AM    K 4.6 04/24/2025 07:09 AM    K 3.1 03/24/2022 01:19 PM    CL 95 04/24/2025 07:09 AM    CO2 21 04/24/2025 07:09 AM    BUN 56 04/24/2025 07:09 AM    CREATININE 1.5 04/24/2025 07:09 AM    GFRAA >60 03/26/2022 05:15 AM    LABGLOM 50 04/24/2025 07:09 AM    GLUCOSE 149 04/24/2025 07:09 AM    CALCIUM 8.3 04/24/2025 07:09 AM    BILITOT 0.3 04/22/2025 11:47 AM    ALKPHOS 87 04/22/2025 11:47 AM    AST 24 04/22/2025 11:47 AM    ALT 26 04/22/2025 11:47 AM     Hepatic Function Panel:    Lab Results   Component Value Date/Time    ALKPHOS 87 04/22/2025 11:47 AM    ALT 26 04/22/2025 11:47 AM    AST 24 04/22/2025 11:47 AM    BILITOT 0.3 04/22/2025 11:47 AM    BILIDIR <0.2 04/11/2025 08:39 AM    IBILI Can not be calculated 04/11/2025 08:39 AM       IR US GUIDED PARACENTESIS         IR GUIDED TUNNELED INTRAPERITONEAL CATH W OR WO CONTRAST PERC         CT ABDOMEN PELVIS W IV CONTRAST Additional Contrast? None   Final Result   1. Stable large right pleural effusion and small left pleural effusion with   compensatory atelectatic changes at the lung bases bilaterally.   2. Small pericardial effusion.   3. Moderate amount of ascites identified within the abdomen.   4. Extensive omental caking stable and unchanged when compared to the prior   study consistent with metastatic disease.   5. Stable small lymph nodes identified scattered throughout the   retroperitoneum and celiac axis concerning for metastatic process.   6. Unchanged abnormal appearance of the bladder with wall thickening   identified along the right

## 2025-04-24 NOTE — PROGRESS NOTES
Pt. Came to specials for abdominal pleurx catheter placement.  Patient has been NPO since midnight.     Dr. Damico explained procedure and answered any questions.  Patient was educated about the amount of radiation used with today's procedure.  Consent signed.     Patient positioned, secured to table.   Emotional support given.  All monitors and safety equipment secured. Prep begun    Lidocaine 1% given, no sedation per Dr Damico     1423 procedure start /75 124 HR Tachycardic 15 95% 3L NC     Pleurx catheter secured with non absorbable sutures.     3 non absorbable suture placed at incision site. Skin glue and Tegaderm    1455 procedure completed  15 100/70 93% 3L NC      4350 cc of red tinged tea colored fluid drained.  DSD applied to abdomen. CDI.       Pt. tolerated well.    Post chest xray taken    See sedation documentation for vital signs    Total amount of sedation medication given during procedure: 0 mg of IV Versed and 0 mcg of IV Fentanyl     Nurse to nurse called to floor spoke with [facility] RN, nurse notified of above information.  Nurse assumed post sedation vital signs     Patient transferred back to the [facility].

## 2025-04-24 NOTE — PROGRESS NOTES
Occupational Therapy  OT SESSION ATTEMPT     Date:2025  Patient Name: Herb Rand  MRN: [id number]  : 1950  Room: [address] [place] [address]     Attempted OT session this date:    [] unavailable due to other medical staff currently with pt   [] on hold per nursing staff   [] on hold per nursing staff secondary to lab / radiology results    [] declined Occupational Therapy  this date due to ___.  Benefits of participation in therapy reviewed with pt.    [] off unit   [x] Other: AM attempt x3 (using urinal x10 minutes initially then eating during second attempt; asked therapist to return at another time; off unit in PM)    Will reattempt OT evaluation at a later time.    Vic Bashir OTR/L #[id number]

## 2025-04-24 NOTE — PROGRESS NOTES
Subjective:    The patient is resting in bed, wakes easily to sound of my voice. He states his pain is 4/10. Denies n/v/d. He reports not much of a change from yesterday.    Objective:    BP 99/63   Pulse (!) 111   Temp 97.6 °F (36.4 °C) (Oral)   Resp 20   Ht 1.88 m (6' 2.02\")   Wt 131 kg (288 lb 12.8 oz)   SpO2 94%   BMI 37.06 kg/m²     General: Alert, oriented, no acute distress  HEENT: No thrush or mucositis, EOMI, PERRLA  Heart:  RRR, no murmurs, gallops, or rubs.  Lungs:  CTA bilaterally, no wheeze, rales or rhonchi  Abd: BS present, distended, tender to light palpation, no masses  Extrem:  No clubbing, cyanosis. BLE edema  Lymphatics: No palpable adenopathy in cervical and supraclavicular regions  Skin: Intact, no petechia or purpura    CBC with Differential:    Lab Results   Component Value Date/Time    WBC 10.1 04/23/2025 05:55 AM    RBC 3.55 04/23/2025 05:55 AM    HGB 10.8 04/23/2025 05:55 AM    HCT 33.6 04/23/2025 05:55 AM     04/23/2025 05:55 AM    MCV 94.6 04/23/2025 05:55 AM    MCH 30.4 04/23/2025 05:55 AM    MCHC 32.1 04/23/2025 05:55 AM    RDW 15.1 04/23/2025 05:55 AM    NRBC 1 04/13/2025 08:09 AM    METASPCT 4 04/18/2025 11:33 AM    LYMPHOPCT 3 04/23/2025 05:55 AM    MONOPCT 4 04/23/2025 05:55 AM    MYELOPCT 1 04/18/2025 11:33 AM    EOSPCT 0 04/23/2025 05:55 AM    BASOPCT 0 04/23/2025 05:55 AM    MONOSABS 0.36 04/23/2025 05:55 AM    LYMPHSABS 0.27 04/23/2025 05:55 AM    EOSABS 0.00 04/23/2025 05:55 AM    BASOSABS 0.00 04/23/2025 05:55 AM     CMP:    Lab Results   Component Value Date/Time     04/24/2025 07:09 AM    K 4.6 04/24/2025 07:09 AM    K 3.1 03/24/2022 01:19 PM    CL 95 04/24/2025 07:09 AM    CO2 21 04/24/2025 07:09 AM    BUN 56 04/24/2025 07:09 AM    CREATININE 1.5 04/24/2025 07:09 AM    GFRAA >60 03/26/2022 05:15 AM    LABGLOM 50 04/24/2025 07:09 AM    GLUCOSE 149 04/24/2025 07:09 AM    CALCIUM 8.3 04/24/2025 07:09 AM    BILITOT 0.3 04/22/2025 11:47 AM    ALKPHOS 87

## 2025-04-24 NOTE — PROGRESS NOTES
Comprehensive Nutrition Assessment    Type and Reason for Visit:  Initial, Positive nutrition screen (MST 2)    Nutrition Recommendations/Plan:   Continue Current Diet: Regular, Low Na (2gm).  Continue Oral Nutrition Supplement: Glucerna (diabetic) TID.  Start Oral Nutrition Supplement: Gunner (Wound healing) BID.      Malnutrition Assessment:  Malnutrition Status:  Moderate malnutrition (04/24/25 1320)    Context:  Chronic Illness     Findings of the 6 clinical characteristics of malnutrition:  Energy Intake:  Mild decrease in energy intake (poor po intake PTA per pt)  Weight Loss:  10% over 6 months (7.7% wt loss in the last 8 months)     Body Fat Loss:  No body fat loss     Muscle Mass Loss:  Mild muscle mass loss Temples (temporalis), Clavicles (pectoralis & deltoids)  Fluid Accumulation:  Severe Extremities   Strength:  Not Performed    Nutrition Assessment:    Pt admit for hyponatremia. Found to have malignant ascites, chronic bilateral PE, pericardial effusion, DAX, divterticulosis. PMHx: esophageal cancer stage IV, COPD, PE, anasarca, Afib, HTN, HLD, CAD, cardiomegaly, sepsis, syncope & collapse. Pt meets criteria for moderate malnutrition. Pt reports ongoing poor appetite, consuming 50-75% of meals, 100% Glucerna ONS. Will add Gunner BID for wound healing, encourage intake and continue inpatient monitoring.    Nutrition Related Findings:    A&Ox4, +BS, gross ascites, abd distended, tenderness RUQ, constipation, Last BM 4/24/25, I/Os +2L since admit, BLE +3/+3 edema, NC 2L/min, Na 127, Cl 95, BUN 56, Cr 1.5, GFR 50, Bglu 147-211, Alb 2.3, 7.7% wt loss in the last 8 months. Wound Type: Multiple, Skin Tears (abd lateral lower Rt puncture POA, coccyx skin tear-excoriation)       Current Nutrition Intake & Therapies:    Average Meal Intake: 51-75%  Average Supplements Intake: %  ADULT DIET; Regular; Low Sodium (2 gm)  ADULT ORAL NUTRITION SUPPLEMENT; Breakfast, Lunch, Dinner; Diabetic Oral  Supplement  ADULT ORAL NUTRITION SUPPLEMENT; Breakfast, Dinner; Wound Healing Oral Supplement    Anthropometric Measures:  Height: 188 cm (6' 2.02\")  Ideal Body Weight (IBW): 190 lbs (86 kg)    Admission Body Weight: 131 kg (288 lb 13 oz) (4/24/25 bed scale)  Current Body Weight: 131 kg (288 lb 13 oz) (4/24/25 bed scale), 152 % IBW. Weight Source: Bed scale  Current BMI (kg/m2): 37.1  Usual Body Weight: 141.9 kg (312 lb 13 oz) (8/15/24 bed scale)  % Weight Change (Calculated): -7.7  Weight Adjustment For: No Adjustment  BMI Categories: Obese Class 2 (BMI 35.0 -39.9)    Estimated Daily Nutrient Needs:  Energy Requirements Based On: Kcal/kg  Weight Used for Energy Requirements: Current  Energy (kcal/day): 2600-2800kcal/day (MSJx1.3SF)  Weight Used for Protein Requirements: Ideal  Protein (g/day): 170-185g/day (>/=2g/kg IBW)  Method Used for Fluid Requirements: 1 ml/kcal  Fluid (ml/day): 2600-2800ml/day    Nutrition Diagnosis:   Moderate malnutrition, in context of chronic illness related to catabolic illness (esophageal cancer stage IV, malignant ascites) as evidenced by criteria as identified in malnutrition assessment    Nutrition Interventions:   Food and/or Nutrient Delivery: Continue Current Diet, Continue Oral Nutrition Supplement, Start Oral Nutrition Supplement  Nutrition Education/Counseling: Education/Counseling not indicated  Coordination of Nutrition Care: Continue to monitor while inpatient     Goals:  Goals: PO intake 75% or greater, by next RD assessment  Type of Goal: New goal  Previous Goal Met: New Goal    Nutrition Monitoring and Evaluation:   Behavioral-Environmental Outcomes: None Identified  Food/Nutrient Intake Outcomes: Food and Nutrient Intake, Supplement Intake  Physical Signs/Symptoms Outcomes: Biochemical Data, Chewing or Swallowing, Constipation, GI Status, Fluid Status or Edema, Nutrition Focused Physical Findings, Skin, Weight    Discharge Planning:    Too soon to determine     Diamond  MINNIE Guadalupe  Contact: [phone]

## 2025-04-25 LAB
ANION GAP SERPL CALCULATED.3IONS-SCNC: 13 MMOL/L (ref 7–16)
BUN SERPL-MCNC: 60 MG/DL (ref 6–23)
CALCIUM SERPL-MCNC: 8.3 MG/DL (ref 8.6–10.2)
CHLORIDE SERPL-SCNC: 94 MMOL/L (ref 98–107)
CO2 SERPL-SCNC: 18 MMOL/L (ref 22–29)
CREAT SERPL-MCNC: 1.4 MG/DL (ref 0.7–1.2)
ERYTHROCYTE [DISTWIDTH] IN BLOOD BY AUTOMATED COUNT: 15.1 % (ref 11.5–15)
FERRITIN SERPL-MCNC: 796 NG/ML
GFR, ESTIMATED: 53 ML/MIN/1.73M2
GLUCOSE BLD-MCNC: 152 MG/DL (ref 74–99)
GLUCOSE BLD-MCNC: 173 MG/DL (ref 74–99)
GLUCOSE BLD-MCNC: 192 MG/DL (ref 74–99)
GLUCOSE BLD-MCNC: 204 MG/DL (ref 74–99)
GLUCOSE SERPL-MCNC: 150 MG/DL (ref 74–99)
HCT VFR BLD AUTO: 33.7 % (ref 37–54)
HGB BLD-MCNC: 11.4 G/DL (ref 12.5–16.5)
IRON SATN MFR SERPL: 20 % (ref 20–55)
IRON SERPL-MCNC: 26 UG/DL (ref 61–157)
MAGNESIUM SERPL-MCNC: 2.1 MG/DL (ref 1.6–2.6)
MCH RBC QN AUTO: 31.1 PG (ref 26–35)
MCHC RBC AUTO-ENTMCNC: 33.8 G/DL (ref 32–34.5)
MCV RBC AUTO: 92.1 FL (ref 80–99.9)
PHOSPHATE SERPL-MCNC: 4.7 MG/DL (ref 2.5–4.5)
PLATELET # BLD AUTO: 136 K/UL (ref 130–450)
PMV BLD AUTO: 8.6 FL (ref 7–12)
POTASSIUM SERPL-SCNC: 4.9 MMOL/L (ref 3.5–5)
RBC # BLD AUTO: 3.66 M/UL (ref 3.8–5.8)
SODIUM SERPL-SCNC: 125 MMOL/L (ref 132–146)
TIBC SERPL-MCNC: 130 UG/DL (ref 250–450)
WBC OTHER # BLD: 12.5 K/UL (ref 4.5–11.5)

## 2025-04-25 PROCEDURE — 80048 BASIC METABOLIC PNL TOTAL CA: CPT

## 2025-04-25 PROCEDURE — 2700000000 HC OXYGEN THERAPY PER DAY

## 2025-04-25 PROCEDURE — 83540 ASSAY OF IRON: CPT

## 2025-04-25 PROCEDURE — 82962 GLUCOSE BLOOD TEST: CPT

## 2025-04-25 PROCEDURE — 82728 ASSAY OF FERRITIN: CPT

## 2025-04-25 PROCEDURE — 36415 COLL VENOUS BLD VENIPUNCTURE: CPT

## 2025-04-25 PROCEDURE — 2060000000 HC ICU INTERMEDIATE R&B

## 2025-04-25 PROCEDURE — 6370000000 HC RX 637 (ALT 250 FOR IP): Performed by: INTERNAL MEDICINE

## 2025-04-25 PROCEDURE — 97165 OT EVAL LOW COMPLEX 30 MIN: CPT | Performed by: OCCUPATIONAL THERAPIST

## 2025-04-25 PROCEDURE — 2500000003 HC RX 250 WO HCPCS: Performed by: INTERNAL MEDICINE

## 2025-04-25 PROCEDURE — 97530 THERAPEUTIC ACTIVITIES: CPT | Performed by: PHYSICAL THERAPIST

## 2025-04-25 PROCEDURE — P9047 ALBUMIN (HUMAN), 25%, 50ML: HCPCS | Performed by: INTERNAL MEDICINE

## 2025-04-25 PROCEDURE — 83735 ASSAY OF MAGNESIUM: CPT

## 2025-04-25 PROCEDURE — 6370000000 HC RX 637 (ALT 250 FOR IP)

## 2025-04-25 PROCEDURE — 85027 COMPLETE CBC AUTOMATED: CPT

## 2025-04-25 PROCEDURE — 84100 ASSAY OF PHOSPHORUS: CPT

## 2025-04-25 PROCEDURE — 6360000002 HC RX W HCPCS: Performed by: INTERNAL MEDICINE

## 2025-04-25 PROCEDURE — 83550 IRON BINDING TEST: CPT

## 2025-04-25 PROCEDURE — 97161 PT EVAL LOW COMPLEX 20 MIN: CPT | Performed by: PHYSICAL THERAPIST

## 2025-04-25 RX ORDER — ALBUMIN (HUMAN) 12.5 G/50ML
25 SOLUTION INTRAVENOUS ONCE
Status: COMPLETED | OUTPATIENT
Start: 2025-04-25 | End: 2025-04-25

## 2025-04-25 RX ORDER — FUROSEMIDE 10 MG/ML
40 INJECTION INTRAMUSCULAR; INTRAVENOUS ONCE
Status: COMPLETED | OUTPATIENT
Start: 2025-04-25 | End: 2025-04-25

## 2025-04-25 RX ADMIN — OXYCODONE 5 MG: 5 TABLET ORAL at 04:18

## 2025-04-25 RX ADMIN — OXYCODONE 5 MG: 5 TABLET ORAL at 21:00

## 2025-04-25 RX ADMIN — PANTOPRAZOLE SODIUM 40 MG: 40 TABLET, DELAYED RELEASE ORAL at 10:04

## 2025-04-25 RX ADMIN — ALBUMIN (HUMAN) 25 G: 0.25 INJECTION, SOLUTION INTRAVENOUS at 11:03

## 2025-04-25 RX ADMIN — FUROSEMIDE 40 MG: 10 INJECTION, SOLUTION INTRAMUSCULAR; INTRAVENOUS at 12:16

## 2025-04-25 RX ADMIN — METOPROLOL SUCCINATE 25 MG: 25 TABLET, EXTENDED RELEASE ORAL at 10:04

## 2025-04-25 RX ADMIN — SODIUM CHLORIDE, PRESERVATIVE FREE 10 ML: 5 INJECTION INTRAVENOUS at 10:04

## 2025-04-25 RX ADMIN — PANTOPRAZOLE SODIUM 40 MG: 40 TABLET, DELAYED RELEASE ORAL at 20:49

## 2025-04-25 RX ADMIN — INSULIN LISPRO 1 UNITS: 100 INJECTION, SOLUTION INTRAVENOUS; SUBCUTANEOUS at 16:51

## 2025-04-25 RX ADMIN — TERAZOSIN 5 MG: 5 CAPSULE ORAL at 20:48

## 2025-04-25 RX ADMIN — INSULIN LISPRO 1 UNITS: 100 INJECTION, SOLUTION INTRAVENOUS; SUBCUTANEOUS at 20:53

## 2025-04-25 RX ADMIN — FUROSEMIDE 40 MG: 10 INJECTION, SOLUTION INTRAMUSCULAR; INTRAVENOUS at 10:03

## 2025-04-25 RX ADMIN — OXYCODONE 5 MG: 5 TABLET ORAL at 12:15

## 2025-04-25 ASSESSMENT — PAIN SCALES - GENERAL
PAINLEVEL_OUTOF10: 8
PAINLEVEL_OUTOF10: 8
PAINLEVEL_OUTOF10: 0
PAINLEVEL_OUTOF10: 6
PAINLEVEL_OUTOF10: 3
PAINLEVEL_OUTOF10: 0
PAINLEVEL_OUTOF10: 8

## 2025-04-25 ASSESSMENT — PAIN DESCRIPTION - DESCRIPTORS
DESCRIPTORS: TENDER;THROBBING
DESCRIPTORS: ACHING;DISCOMFORT
DESCRIPTORS: ACHING;DISCOMFORT

## 2025-04-25 ASSESSMENT — PAIN DESCRIPTION - ORIENTATION
ORIENTATION: RIGHT
ORIENTATION: RIGHT

## 2025-04-25 ASSESSMENT — PAIN DESCRIPTION - LOCATION
LOCATION: ABDOMEN;BACK
LOCATION: ABDOMEN

## 2025-04-25 NOTE — PROGRESS NOTES
Nephrology Note  Hadley Gifford MD          Patient seen and examined.  No family member is present at bedside.  Chart reviewed.  Patient is status post Pleurx drain placement in the abdomen.  He is feeling better as abdominal distention is improved.  As per the hematology oncology notes patient has decided to transition to comfort measures and is planning to discharge home with hospice.  He is less short of breath.  Appetite is good ..  No nausea or dysguesia.    Awake and alert .   In no acute distress.    Vital SignsBP 102/80   Pulse (!) 120   Temp 98 °F (36.7 °C) (Oral)   Resp 17   Ht 1.88 m (6' 2.02\")   Wt 131 kg (288 lb 12.8 oz)   SpO2 96%   BMI 37.06 kg/m²   24HR INTAKE/OUTPUT:    Intake/Output Summary (Last 24 hours) at 4/25/2025 1514  Last data filed at 4/25/2025 0556  Gross per 24 hour   Intake --   Output 500 ml   Net -500 ml         PHYSICAL EXAM        Neck: No JVD  Chest: Chest is symmetrical,  Lungs: Vesicular breath sounds decreased at the bases. No rales or ronchi.  Heart: Regular rate and rhythm. No S3 gallop. No  murmrur.  Abdomen: Soft, distended, Pleurx catheter in place,   non tender and normal bowel sounds.  Extremeties: +1 pitting edema bilaterally.         Current Facility-Administered Medications   Medication Dose Route Frequency Provider Last Rate Last Admin    terazosin (HYTRIN) capsule 5 mg (Patient Supplied)  5 mg Oral Nightly Zack Lucero MD   5 mg at 04/24/25 2037    oxyCODONE (ROXICODONE) immediate release tablet 5 mg  5 mg Oral TID PRN Miranda Billingsley APRN - CNP   5 mg at 04/25/25 1215    sodium chloride flush 0.9 % injection 5-40 mL  5-40 mL IntraVENous PRN Zack Lucero MD   10 mL at 04/25/25 1004    0.9 % sodium chloride infusion   IntraVENous PRN Zack Lucero MD        potassium chloride (KLOR-CON M) extended release tablet 40 mEq  40 mEq Oral PRN Zack Lucero MD        Or    potassium bicarb-citric acid (EFFER-K) effervescent tablet 40 mEq  40

## 2025-04-25 NOTE — PROGRESS NOTES
Primary Care Physician: Gabe Goodwin MD   Admitting Physician:  Zack Lucero MD  Admission date and time: 4/22/2025 10:21 AM    Room:  [address] [place] [address]  Admitting diagnosis: Hyponatremia [E87.1]  Pericardial effusion [I31.39]  Ascites, malignant (HCC) [R18.0]  Generalized weakness [R53.1]  Malignant ascites (HCC) [R18.0]  Primary cancer of esophagus with metastasis to other site (HCC) [C15.9]  Chronic bilateral pleural effusions [J90]    Patient Name: Herb Rand  MRN: [id number]    Date of Service: 4/25/2025     Subjective:  Herb is a 74 y.o. male who was seen and examined today,4/25/2025, at the bedside. Patient feeling much better today although abdominal distention persist, interventional radiology is going to place the catheter today in the peritoneum.  Discussed with patient regarding hospice he says that the family wants hospice at this time.    No family present during my examination.    ROS:  General:   Denies chills, fever, malaise, night sweats or weight loss, positive for fatigue and weakness     Psychological:   Denies anxiety, disorientation or hallucinations     ENT:    Denies epistaxis, headaches, vertigo or visual changes     Cardiovascular:   Denies any chest pain, irregular heartbeats, or palpitations. No paroxysmal nocturnal dyspnea.     Respiratory:   Denies shortness of breath, coughing, sputum production, hemoptysis, or wheezing.  No orthopnea.     Gastrointestinal:   Denies nausea, vomiting, diarrhea, or constipation.  Denies any abdominal pain.  Denies change in bowel habits or stools, positive for abdominal distention and fluid     Genito-Urinary:    Denies any urgency, frequency, hematuria.  Voiding without difficulty.     Musculoskeletal:   Denies joint pain, joint stiffness, joint swelling or muscle pain     Neurology:    Denies any headache or focal neurological deficits. No weakness or paresthesia.     Derm:    Denies any rashes, ulcers, or excoriations.  Denies bruising.    05:55 AM    MYELOPCT 1 04/18/2025 11:33 AM    EOSPCT 0 04/23/2025 05:55 AM    BASOPCT 0 04/23/2025 05:55 AM    MONOSABS 0.36 04/23/2025 05:55 AM    LYMPHSABS 0.27 04/23/2025 05:55 AM    EOSABS 0.00 04/23/2025 05:55 AM    BASOSABS 0.00 04/23/2025 05:55 AM     CMP:    Lab Results   Component Value Date/Time     04/25/2025 07:46 AM    K 4.9 04/25/2025 07:46 AM    K 3.1 03/24/2022 01:19 PM    CL 94 04/25/2025 07:46 AM    CO2 18 04/25/2025 07:46 AM    BUN 60 04/25/2025 07:46 AM    CREATININE 1.4 04/25/2025 07:46 AM    GFRAA >60 03/26/2022 05:15 AM    LABGLOM 53 04/25/2025 07:46 AM    GLUCOSE 150 04/25/2025 07:46 AM    CALCIUM 8.3 04/25/2025 07:46 AM    BILITOT 0.3 04/22/2025 11:47 AM    ALKPHOS 87 04/22/2025 11:47 AM    AST 24 04/22/2025 11:47 AM    ALT 26 04/22/2025 11:47 AM     Hepatic Function Panel:    Lab Results   Component Value Date/Time    ALKPHOS 87 04/22/2025 11:47 AM    ALT 26 04/22/2025 11:47 AM    AST 24 04/22/2025 11:47 AM    BILITOT 0.3 04/22/2025 11:47 AM    BILIDIR <0.2 04/11/2025 08:39 AM    IBILI Can not be calculated 04/11/2025 08:39 AM       IR US GUIDED PARACENTESIS         IR GUIDED TUNNELED INTRAPERITONEAL CATH W OR WO CONTRAST PERC         CT ABDOMEN PELVIS W IV CONTRAST Additional Contrast? None   Final Result   1. Stable large right pleural effusion and small left pleural effusion with   compensatory atelectatic changes at the lung bases bilaterally.   2. Small pericardial effusion.   3. Moderate amount of ascites identified within the abdomen.   4. Extensive omental caking stable and unchanged when compared to the prior   study consistent with metastatic disease.   5. Stable small lymph nodes identified scattered throughout the   retroperitoneum and celiac axis concerning for metastatic process.   6. Unchanged abnormal appearance of the bladder with wall thickening   identified along the right lateral aspect. Findings concerning for underlying   malignancy.  Cystography recommended for  further evaluation.   7. Diverticulosis with no evidence of diverticulitis.   8. Mild anasarca seen within the soft tissues.         XR CHEST (2 VW)   Final Result   1. Increasing hazy density in the right mid and lower lung. Findings may be   due to atelectasis, pneumonia, or asymmetric edema.   2. Cardiomegaly.             ASSESSMENT:    Hyperkalemia  Acute kidney injury  ascites  Hyponatremia  Anemia  Coronary artery disease  Weakness and tachycardia  Cardiomegaly  Anasarca  Metastatic stage IV esophageal CA  Recurrent ascites  Hyperlipidemia  Hypertension  Morbid obesity  COPD  Pleural effusion     PLAN:     Admit the patient  S/p abdominal Pleurx catheter placed 4350 cc of red tinge colored fluid drained  Hold anticoagulants  Appreciate nephrology input  DVT GI prophylaxis  Continue home medications  Discussed with patient in detail  Check albumin and total protein  Prognosis guarded discussed with patient he wants to discuss with the family regarding palliative and hospice           More than 50% of my time was spent at the bedside counseling/coordinating care with the patient and/or family with face to face contact.  This time was spent reviewing notes and laboratory data as well as instructing and counseling the patient. Time I spent with the family or surrogate(s) is included only if the patient was incapable of providing the necessary information or participating in medical decisions. I also discussed the differential diagnosis and all of the proposed management plans with the patient and individuals accompanying the patient. I am readily available for any further decision-making and intervention.       Electronically signed by Zack Lucero MD on 4/25/2025 at 8:36 AM

## 2025-04-25 NOTE — CARE COORDINATION
4/25/25 1030 CM note: no covid testing. IV lasix. S/P abdominal pleurx catheter placement in IR on 4/24/25, 4350 cc of fluid removed. WILL NEED ORDER WRITTEN FOR FREQUENCY AND AMOUNT TO DRAIN. Hx metastatic gastroesophageal adenocarcinoma, pt follows w/ Dr Munson at the [facility]. Oncology, nephrology, wound care, and PT/OT following. 4L nc. Discharge plan is home with [facility] hospice. Per Melina, hospice liaison, they can accept patient w/ pleurx cathter, will need orders to drain, and 2-3 pleurx kits sent home while they await their delivery.  Melina will need updated on date of dc so they can arrange for DME to be delivered to pts home. Updated pts RORO Dominguez, charge nurse Carlee, and Dr Lucero on above. Pt resides with his wife, pts son was also involved w/ hospice discussion. CM will follow. Electronically signed by Janneth Stubbs RN on 4/25/2025 at 10:58 AM       Update: 4/25/25 7746 Updated [facility] hospice liaison, that we are awaiting order for frequency/amount for abd pleurx catheter, and clarification of when doctor is okay for his dc. She states if dc is after hours/ weekend staff is to call their main number at [phone] to arrange delivery of home DME and hospice SOC at home. Electronically signed by Janneth Stubbs RN on 4/25/2025 at 2:08 PM    Update: 4/25/25 3934 Spoke with Dr Lucero, after office hours he will place pleurx drainage orders and requests arrangements be made for pt to discharge tomorrow. Melina [facility] hospice liaison, spoke with wife and she checking into further options as his wife is getting radiation herself and has sciatica that she is to get a shot for on Tuesday. She thought patient would be walking independently at time of discharge. She is going to talk to her kids about having private aides come in to assist as needed vs SNF with hospice; therefore arrangements for dc are on hold. Electronically signed by Janneth Stubbs RN on 4/25/2025  at 4:08 PM

## 2025-04-25 NOTE — PROGRESS NOTES
OCCUPATIONAL THERAPY INITIAL EVALUATION    [facility]  [address] [facility]         Date:2025                                                   Patient Name: Herb Rand     MRN: [id number]     : 1950     Room: [address] [place] [address]       Evaluating OT: Georgia Skelton, JENNIFFERR/L; [id number]       Referring Provider and Orders/Date:    OT eval and treat  Start:  25,   End:  25,   ONE TIME,   Standing Count:  1 Occurrences,   R       Zack Lucero MD    Recommended placement: home with hospice as requested       Diagnosis:   1. Hyponatremia    2. Chronic bilateral pleural effusions    3. Pericardial effusion    4. Malignant ascites (HCC)    5. Generalized weakness         Surgery: S/P abdominal pleurx catheter placement in IR on 25       Pertinent Medical History:        Past Medical History:   Diagnosis Date    Arthritis     lower back    Asthma     Atrial fibrillation (HCC)     Diabetes mellitus (HCC)     diet controlled    H/O cardiovascular stress test 2020    Lexiscan    Hypertension     Sleep apnea           Past Surgical History:   Procedure Laterality Date    CARDIAC PROCEDURE N/A 9/3/2024    Left heart cath / coronary angiography performed by Edwin Smith MD at [facility] CARDIAC CATH/IR    CHOLECYSTECTOMY      PROSTATECTOMY N/A 10/12/2020    LAPAROSCOPIC ROBOTIC ASSISTED BLADDER DIVERTICULECTOMY WITH FLEXIBLE  CYSTOSCOPY performed by Vic Cadet MD at [facility] OR    TONSILLECTOMY      TURP  2017    cr    TURP N/A 2020    CYSTOSCOPY, RETROGRADE, PYELOGRAM. CYSTOGRAM. TRANSURETHRAL RESECTION PROSTATE performed by Jerson Osuna DO at [facility] OR       Precautions:  Fall Risk, metastatic gastroesophageal adenocarcinoma, 4L, pleurx cathter, external carney        Assessment of current deficits     [x] Functional mobility  [x]ADLs  [x] Strength               []Cognition     [x] Functional transfers   [x] IADLs  Tolerance Vitals with activity: fair- with fatigue in the mid morning; sitting EOB <5min and standing <1min    Increase standing tolerance for >3min with stable vital signs for carry over into toileting, functional tranfers and indep in ADLs   Visual/  Perceptual Glasses: Present; WFL    Reports change in vision since admission: No     NA     Hand Dominance  [x] Right  [] Left    AROM (PROM) Strength Additional Info:  Goal:   RUE  WFL 4/5 good  and  FMC/dexterity noted during ADL tasks  Opposition [x] Intact [] Impaired  Finger to nose [x] Intact [] Impaired 5/5MMT generally for carry over into self care, functional transfers and functional mobility with AD.    LUE WFL 4/5 good  and  FMC/dexterity noted during ADL tasks  Opposition [x] Intact [] Impaired  Finger to nose [x] Intact [] Impaired 5/5MMT generally for carry over into self care, functional transfers and functional mobility with AD.      Hearing: WFL   Sensation:  No c/o numbness or tingling   Tone: WFL  Edema: bilateral lower extremities     Comments: Upon arrival patient supine and sleeping. Cooperative, but very fatigued. The biggest barriers reflect that of functional transfers, functional mobility, UB/LB ADLs, activity tolerance, balance, safety and strengthening. At end of session, patient supine with call light and phone within reach, all lines and tubes intact.  Overall patient demonstrated decreased independence and safety during completion of ADL/functional transfer/mobility tasks compared to PLOF. Nursing updated on pt position and status following OT eval. Pt would benefit from continued skilled OT to increase safety and independence with completion of ADL/IADL tasks for functional independence and quality of life.    Treatment: OT treatment provided this date includes:  Instruction, education and training on safe facilitation and adapted techniques for completion of ADLs. These include neuromuscular reeducation to facilitate

## 2025-04-25 NOTE — PROGRESS NOTES
Daughter spoke with  about postponing discharging patient d/t needing to speak with brother before any decisions about patient's continuing care take place. Daughter voiced concerns to  regarding mom/patient's wife's ability to make decisions at this time r/t pain management she is receiving for her cancer tx's and overall level of stress she is under.

## 2025-04-25 NOTE — PROGRESS NOTES
Subjective:    The patient is resting in bed, wakes easily to sound of my voice. He currently has 7/10 abdominal pain, not due for pain meds at this time. He tolerated PleurX drain placement well and felt relief from fluid removal. Denies n/v/d. Tolerating diet well. He has decided to transition to comfort measures and is planning to discharge home with hospice.     Objective:    /62   Pulse 97   Temp 98 °F (36.7 °C) (Oral)   Resp 20   Ht 1.88 m (6' 2.02\")   Wt 131 kg (288 lb 12.8 oz)   SpO2 95%   BMI 37.06 kg/m²     General: Resting, no acute distress  HEENT: No thrush or mucositis, EOMI, PERRLA  Heart:  RRR, no murmurs, gallops, or rubs.  Lungs:  CTA bilaterally, no wheeze, rales or rhonchi  Abd: BS present, distended, tender to light palpation, no masses. PleurX drain intact  Extrem:  No clubbing, cyanosis, or edema  Lymphatics: No palpable adenopathy in cervical and supraclavicular regions  Skin: Intact, no petechia or purpura    CBC with Differential:    Lab Results   Component Value Date/Time    WBC 10.1 04/23/2025 05:55 AM    RBC 3.55 04/23/2025 05:55 AM    HGB 10.8 04/23/2025 05:55 AM    HCT 33.6 04/23/2025 05:55 AM     04/23/2025 05:55 AM    MCV 94.6 04/23/2025 05:55 AM    MCH 30.4 04/23/2025 05:55 AM    MCHC 32.1 04/23/2025 05:55 AM    RDW 15.1 04/23/2025 05:55 AM    NRBC 1 04/13/2025 08:09 AM    METASPCT 4 04/18/2025 11:33 AM    LYMPHOPCT 3 04/23/2025 05:55 AM    MONOPCT 4 04/23/2025 05:55 AM    MYELOPCT 1 04/18/2025 11:33 AM    EOSPCT 0 04/23/2025 05:55 AM    BASOPCT 0 04/23/2025 05:55 AM    MONOSABS 0.36 04/23/2025 05:55 AM    LYMPHSABS 0.27 04/23/2025 05:55 AM    EOSABS 0.00 04/23/2025 05:55 AM    BASOSABS 0.00 04/23/2025 05:55 AM     CMP:    Lab Results   Component Value Date/Time     04/24/2025 07:09 AM    K 4.6 04/24/2025 07:09 AM    K 3.1 03/24/2022 01:19 PM    CL 95 04/24/2025 07:09 AM    CO2 21 04/24/2025 07:09 AM    BUN 56 04/24/2025 07:09 AM    CREATININE 1.5  spray, Nasal, BID, Zack Lucero MD, 2 spray at 04/24/25 1023    pantoprazole (PROTONIX) tablet 40 mg, 40 mg, Oral, BID, Zack Lucero MD, 40 mg at 04/24/25 2037    furosemide (LASIX) injection 40 mg, 40 mg, IntraVENous, Daily, Zack Lucero MD, 40 mg at 04/23/25 0230    albuterol (PROVENTIL) (2.5 MG/3ML) 0.083% nebulizer solution 2.5 mg, 2.5 mg, Nebulization, Q6H PRN, Zack Lucero MD    insulin lispro (HUMALOG,ADMELOG) injection vial 0-4 Units, 0-4 Units, SubCUTAneous, 4x Daily AC & HS, Zack Lucero MD, 1 Units at 04/24/25 1813    IR US GUIDED PARACENTESIS         IR GUIDED TUNNELED INTRAPERITONEAL CATH W OR WO CONTRAST PERC         CT ABDOMEN PELVIS W IV CONTRAST Additional Contrast? None   Final Result   1. Stable large right pleural effusion and small left pleural effusion with   compensatory atelectatic changes at the lung bases bilaterally.   2. Small pericardial effusion.   3. Moderate amount of ascites identified within the abdomen.   4. Extensive omental caking stable and unchanged when compared to the prior   study consistent with metastatic disease.   5. Stable small lymph nodes identified scattered throughout the   retroperitoneum and celiac axis concerning for metastatic process.   6. Unchanged abnormal appearance of the bladder with wall thickening   identified along the right lateral aspect. Findings concerning for underlying   malignancy.  Cystography recommended for further evaluation.   7. Diverticulosis with no evidence of diverticulitis.   8. Mild anasarca seen within the soft tissues.         XR CHEST (2 VW)   Final Result   1. Increasing hazy density in the right mid and lower lung. Findings may be   due to atelectasis, pneumonia, or asymmetric edema.   2. Cardiomegaly.             Assessment:    Principal Problem:    Ascites, malignant (HCC)  Active Problems:    Primary cancer of esophagus with metastasis to other site (HCC)  Resolved Problems:    * No resolved hospital problems.  *    74 year old gentleman known to Dr. Sheppard with metastatic gastroesophageal  adenocarcinoma, pMMR. HER2 1+, PDL1 90%. 10/21/24 EGD: GE junction tumor. Biopsy: poorly differentiated invasive   adenocarcinoma with ulceration.   10/31/24 PET Multiple FDG avid left supraclavicular, paraesophageal,  gastrohepatic, periportal, retroperitoneal and peripancreatic nodes likely   representing nayeli metastasis.   3/11/25 started palliative chemoimmunotherapy with FOLFOX and Opdivo   (nivolumab) regimen.   3rd cycle was delayed per patients request.   4/4/25 therapeutic paracentesis he had 7,550 L blood fluid removed. He felt better for a couple days but recently started feeling his abdomen get bigger and increase in BLE edema.      Recent hospital admission for sob and BLE, recurrent malignant ascites. Unfortunately recent scans reveal clear evidence of rapid disease progression with mostly peritoneal carcinomatosis. It is responsible for rapidly re-accumulating ascites as well as right sided recurrent pleural effusions. His performance status is poor. At this time he unfortunately is unable to tolerate any further chemo treatments. He has decided to pursue comfort measures and symptom management with transition to hospice services at home. This was discussed with patient and his wife during follow up with Dr. Sheppard yesterday. He was advised to go to the ER for further intervention.    Plan:    - IR for PleurX cath placement on 4/24, 4350 cc red tinged tea colored fluid drained  - Oxycodone 5 mg po TID prn for moderate to severe pain  - Continue supportive care  - Patient has decided to transition to comfort measures and will be discharged home with [facility].     Collaboration of care with Dr. Maria    Electronically signed by ARLYN Gao - CNP on 4/25/2025 at 7:37 AM

## 2025-04-25 NOTE — PROGRESS NOTES
Physical Therapy Initial Evaluation/Plan of Care    Room #:  0518/0518-02  Patient Name: Herb Rand  YOB: 1950  MRN: [id number]    Date of Service: 4/25/2025     Tentative placement recommendation: Home with Home Health Physical Therapy, Hospice  Equipment recommendation: Patient has needed equipment       Evaluating Physical Therapist: Herb Wynn, PT Lic.  #[id number]      Specific Provider Orders/Date/Referring Provider :  04/22/25 2000    PT evaluation and treat  Start:  04/22/25 2000,   End:  04/22/25 2000,   ONE TIME,   Standing Count:  1 Occurrences,   R       Zack Lucero MD    Admitting Diagnosis:   Hyponatremia [E87.1]  Pericardial effusion [I31.39]  Ascites, malignant (HCC) [R18.0]  Generalized weakness [R53.1]  Malignant ascites (HCC) [R18.0]  Primary cancer of esophagus with metastasis to other site (HCC) [C15.9]  Chronic bilateral pleural effusions [J90]       Surgery: none  Visit Diagnoses         Codes      Hyponatremia    -  Primary E87.1      Chronic bilateral pleural effusions     J90      Pericardial effusion     I31.39      Malignant ascites (HCC)     R18.0      Generalized weakness     R53.1            Patient Active Problem List   Diagnosis    COVID-19 virus infection    Sepsis (HCC)    Paroxysmal atrial fibrillation (HCC)    Essential hypertension    Morbid obesity (HCC)    Bladder diverticulum    CAD in native artery    Acute electrocardiography changes    Syncope and collapse    Pleural effusion    Esophageal cancer, stage IV (HCC)    Ascites, malignant (HCC)    Primary cancer of esophagus with metastasis to other site (HCC)    Moderate protein-calorie malnutrition        ASSESSMENT of Current Deficits Patient exhibits decreased strength, balance, and endurance impairing functional mobility, transfers, gait , gait distance, and tolerance to activity .  Patient  would benefit from  physical therapy  for balance/stability, strengthening,  energy conservation and safety.  Patient  Independent    Scooting: Independent     Transfers Sit to stand: Minimal assist of 1 to FWW  Sit to stand: Independent to FWW   Ambulation     5 feet using  wheeled walker with Minimal assist of 1   for balance and O2 line management     30 feet using  wheeled walker with Independent    Stair negotiation: ascended and descended   Not assessed     3 steps    ROM Within functional limits    Increase range of motion 10% of affected joints    Strength BUE:  refer to OT eval  RLE:  5-/5  LLE:  5-/5  Increase strength in affected mm groups by 1/3 grade   Balance Sitting EOB:  good    Dynamic Standing:  good - with FWW  Sitting EOB:  good    Dynamic Standing: good with FWW     Patient is Alert & Oriented x person, place, time, and situation and follows directions    Sensation:  Patient  reports neuropathy bilateral lower extremities     Edema:  yes bilateral lower extremities   Endurance: poor      Vitals:  3 liters nasal cannula   Blood Pressure at rest  Blood Pressure during session    Heart Rate at rest   Heart Rate during session     SPO2 at rest  %  SPO2 during session  %     Patient education  Patient educated on role of Physical Therapy, risks of immobility, safety and plan of care,  importance of mobility while in hospital , purse lip breathing, safety , and O2 line management and safety      Patient response to education:   Pt verbalized understanding Pt demonstrated skill Pt requires further education in this area   Yes Yes No      Treatment:  Patient practiced and was instructed/facilitated in the following treatment: Patient   Sat edge of bed 5 minutes with Independent to increase dynamic sitting balance and activity tolerance. Stood and ambulated to bedside chair.      Therapeutic Exercises:  not performed  x   reps.       At end of session, patient in chair with nursing present call light and phone within reach,  all lines and tubes intact, nursing notified.      Patient would benefit from continued skilled

## 2025-04-25 NOTE — PLAN OF CARE
Problem: Discharge Planning  Goal: Discharge to home or other facility with appropriate resources  Outcome: Progressing     Problem: Chronic Conditions and Co-morbidities  Goal: Patient's chronic conditions and co-morbidity symptoms are monitored and maintained or improved  Outcome: Progressing     Problem: Skin/Tissue Integrity  Goal: Skin integrity remains intact  Description: 1.  Monitor for areas of redness and/or skin breakdown2.  Assess vascular access sites hourly3.  Every 4-6 hours minimum:  Change oxygen saturation probe site4.  Every 4-6 hours:  If on nasal continuous positive airway pressure, respiratory therapy assess nares and determine need for appliance change or resting period  Outcome: Progressing     Problem: ABCDS Injury Assessment  Goal: Absence of physical injury  Outcome: Progressing     Problem: Safety - Adult  Goal: Free from fall injury  Outcome: Progressing     Problem: Nutrition Deficit:  Goal: Optimize nutritional status  Outcome: Progressing

## 2025-04-26 LAB
ANION GAP SERPL CALCULATED.3IONS-SCNC: 14 MMOL/L (ref 7–16)
BUN SERPL-MCNC: 66 MG/DL (ref 6–23)
CALCIUM SERPL-MCNC: 8.2 MG/DL (ref 8.6–10.2)
CHLORIDE SERPL-SCNC: 92 MMOL/L (ref 98–107)
CO2 SERPL-SCNC: 19 MMOL/L (ref 22–29)
CREAT SERPL-MCNC: 1.4 MG/DL (ref 0.7–1.2)
ERYTHROCYTE [DISTWIDTH] IN BLOOD BY AUTOMATED COUNT: 15.1 % (ref 11.5–15)
GFR, ESTIMATED: 56 ML/MIN/1.73M2
GLUCOSE BLD-MCNC: 152 MG/DL (ref 74–99)
GLUCOSE BLD-MCNC: 169 MG/DL (ref 74–99)
GLUCOSE BLD-MCNC: 174 MG/DL (ref 74–99)
GLUCOSE BLD-MCNC: 184 MG/DL (ref 74–99)
GLUCOSE SERPL-MCNC: 155 MG/DL (ref 74–99)
HCT VFR BLD AUTO: 32.3 % (ref 37–54)
HGB BLD-MCNC: 10.9 G/DL (ref 12.5–16.5)
MAGNESIUM SERPL-MCNC: 2.1 MG/DL (ref 1.6–2.6)
MCH RBC QN AUTO: 31.1 PG (ref 26–35)
MCHC RBC AUTO-ENTMCNC: 33.7 G/DL (ref 32–34.5)
MCV RBC AUTO: 92.3 FL (ref 80–99.9)
PHOSPHATE SERPL-MCNC: 4.2 MG/DL (ref 2.5–4.5)
PLATELET # BLD AUTO: 135 K/UL (ref 130–450)
PMV BLD AUTO: 8.6 FL (ref 7–12)
POTASSIUM SERPL-SCNC: 4.8 MMOL/L (ref 3.5–5)
RBC # BLD AUTO: 3.5 M/UL (ref 3.8–5.8)
SODIUM SERPL-SCNC: 125 MMOL/L (ref 132–146)
WBC OTHER # BLD: 12.7 K/UL (ref 4.5–11.5)

## 2025-04-26 PROCEDURE — 2060000000 HC ICU INTERMEDIATE R&B

## 2025-04-26 PROCEDURE — 83735 ASSAY OF MAGNESIUM: CPT

## 2025-04-26 PROCEDURE — 84100 ASSAY OF PHOSPHORUS: CPT

## 2025-04-26 PROCEDURE — 82962 GLUCOSE BLOOD TEST: CPT

## 2025-04-26 PROCEDURE — 2500000003 HC RX 250 WO HCPCS: Performed by: INTERNAL MEDICINE

## 2025-04-26 PROCEDURE — 6370000000 HC RX 637 (ALT 250 FOR IP): Performed by: INTERNAL MEDICINE

## 2025-04-26 PROCEDURE — 80048 BASIC METABOLIC PNL TOTAL CA: CPT

## 2025-04-26 PROCEDURE — 36415 COLL VENOUS BLD VENIPUNCTURE: CPT

## 2025-04-26 PROCEDURE — 6370000000 HC RX 637 (ALT 250 FOR IP)

## 2025-04-26 PROCEDURE — 85027 COMPLETE CBC AUTOMATED: CPT

## 2025-04-26 RX ADMIN — MICONAZOLE NITRATE: 20 POWDER TOPICAL at 17:38

## 2025-04-26 RX ADMIN — ACETAMINOPHEN 650 MG: 325 TABLET ORAL at 20:29

## 2025-04-26 RX ADMIN — ACETAMINOPHEN 650 MG: 325 TABLET ORAL at 09:14

## 2025-04-26 RX ADMIN — ANORECTAL OINTMENT: 15.7; .44; 24; 20.6 OINTMENT TOPICAL at 20:34

## 2025-04-26 RX ADMIN — PANTOPRAZOLE SODIUM 40 MG: 40 TABLET, DELAYED RELEASE ORAL at 09:05

## 2025-04-26 RX ADMIN — ANORECTAL OINTMENT: 15.7; .44; 24; 20.6 OINTMENT TOPICAL at 17:37

## 2025-04-26 RX ADMIN — OXYCODONE 5 MG: 5 TABLET ORAL at 09:13

## 2025-04-26 RX ADMIN — TERAZOSIN 5 MG: 5 CAPSULE ORAL at 20:32

## 2025-04-26 RX ADMIN — INSULIN LISPRO 1 UNITS: 100 INJECTION, SOLUTION INTRAVENOUS; SUBCUTANEOUS at 17:37

## 2025-04-26 RX ADMIN — PANTOPRAZOLE SODIUM 40 MG: 40 TABLET, DELAYED RELEASE ORAL at 20:30

## 2025-04-26 ASSESSMENT — PAIN SCALES - GENERAL
PAINLEVEL_OUTOF10: 4
PAINLEVEL_OUTOF10: 2
PAINLEVEL_OUTOF10: 4
PAINLEVEL_OUTOF10: 7

## 2025-04-26 ASSESSMENT — PAIN SCALES - WONG BAKER
WONGBAKER_NUMERICALRESPONSE: NO HURT
WONGBAKER_NUMERICALRESPONSE: HURTS A LITTLE BIT

## 2025-04-26 ASSESSMENT — PAIN DESCRIPTION - DESCRIPTORS: DESCRIPTORS: ACHING;DISCOMFORT

## 2025-04-26 ASSESSMENT — PAIN DESCRIPTION - LOCATION
LOCATION: ABDOMEN
LOCATION: BACK;ABDOMEN
LOCATION: ABDOMEN

## 2025-04-26 ASSESSMENT — PAIN DESCRIPTION - ORIENTATION: ORIENTATION: RIGHT

## 2025-04-26 NOTE — PLAN OF CARE
Problem: Chronic Conditions and Co-morbidities  Goal: Patient's chronic conditions and co-morbidity symptoms are monitored and maintained or improved  Outcome: Progressing     Problem: Discharge Planning  Goal: Discharge to home or other facility with appropriate resources  Outcome: Progressing     Problem: Skin/Tissue Integrity  Goal: Skin integrity remains intact  Description: 1.  Monitor for areas of redness and/or skin breakdown2.  Assess vascular access sites hourly3.  Every 4-6 hours minimum:  Change oxygen saturation probe site4.  Every 4-6 hours:  If on nasal continuous positive airway pressure, respiratory therapy assess nares and determine need for appliance change or resting period  Outcome: Progressing     Problem: ABCDS Injury Assessment  Goal: Absence of physical injury  Outcome: Progressing     Problem: Safety - Adult  Goal: Free from fall injury  Outcome: Progressing     Problem: Nutrition Deficit:  Goal: Optimize nutritional status  Outcome: Progressing     Problem: Pain  Goal: Verbalizes/displays adequate comfort level or baseline comfort level  Outcome: Progressing

## 2025-04-26 NOTE — CARE COORDINATION
SOCIAL WORK / DISCHARGE PLANNING:  Extensive conversation with pt, spouse and dtr Celine vela from [facility]. They are now talking about going to SNF under Medicare to assess how wife will manage her own health issues to return him home with Hospice or transition to Hospice at  SNF if necessary. Long discussion about Medicare skilled coverage/ critieria vs Medicaid. JORJE list provided for review. Interested in 1) [facility] - Melina from [facility] has spoken to Cheri 2) [place] 3) [facility]. NO PRECERT is needed. Dtr will be in town Monday and follow up with Laine tarango CM            Electronically signed by LORI Forman on 4/26/2025 at 5:36 PM

## 2025-04-26 NOTE — PROGRESS NOTES
Primary Care Physician: Gabe Goodwin MD   Admitting Physician:  Zack Lucero MD  Admission date and time: 4/22/2025 10:21 AM    Room:  [address] [place] [address]  Admitting diagnosis: Hyponatremia [E87.1]  Pericardial effusion [I31.39]  Ascites, malignant (HCC) [R18.0]  Generalized weakness [R53.1]  Malignant ascites (HCC) [R18.0]  Primary cancer of esophagus with metastasis to other site (HCC) [C15.9]  Chronic bilateral pleural effusions [J90]    Patient Name: Herb Rand  MRN: [id number]    Date of Service: 4/26/2025     Subjective:  Herb is a 74 y.o. male who was seen and examined today,4/26/2025, at the bedside. Patient feeling much better today although abdominal distention persist, status post abdominal Pleurx catheter placement discussed with patient regarding hospice he says that the family does not want hospice at this time.    No family present during my examination.    ROS:  General:   Denies chills, fever, malaise, night sweats or weight loss, positive for fatigue and weakness     Psychological:   Denies anxiety, disorientation or hallucinations     ENT:    Denies epistaxis, headaches, vertigo or visual changes     Cardiovascular:   Denies any chest pain, irregular heartbeats, or palpitations. No paroxysmal nocturnal dyspnea.     Respiratory:   Denies shortness of breath, coughing, sputum production, hemoptysis, or wheezing.  No orthopnea.     Gastrointestinal:   Denies nausea, vomiting, diarrhea, or constipation.  Denies any abdominal pain.  Denies change in bowel habits or stools, positive for abdominal distention and fluid     Genito-Urinary:    Denies any urgency, frequency, hematuria.  Voiding without difficulty.     Musculoskeletal:   Denies joint pain, joint stiffness, joint swelling or muscle pain     Neurology:    Denies any headache or focal neurological deficits. No weakness or paresthesia.     Derm:    Denies any rashes, ulcers, or excoriations.  Denies bruising.      Extremities:   Denies  PM

## 2025-04-26 NOTE — PROGRESS NOTES
Nephrology Progress Note  Peter Quintana MD    Summary: We have been following for DAX and hyponatremia.    Interval History:    4/26:   Patient seen and examined.  No family member is present at bedside.  Chart reviewed.  Patient has Pleurx drain placement in the abdomen.    Case discussed with nursing staff.  There seems to be some confusion as to whether the patient will be transitioning to hospice measures.  Per the patient's nurse, he stated he wanted to remain full code but I reviewed the oncology note and notes from yesterday which all seem to suggest that the patient will be going home with hospice, initially last evening and then that was postponed to this morning.  Subsequently continues to remain tachycardic and hypotensive.  There is a discharge order in the chart though that apparently also has been helpful as well.  Patient is resting in bed, appears chronically ill.  However not in acute distress.  Denies palpitations, chest fluttering or tightness.  No lightheadedness.  He was up out of bed and did not feel like he was going to fall.      PHYSICAL EXAM      Vital SignsBP 102/71   Pulse (!) 114   Temp 97.5 °F (36.4 °C)   Resp 16   Ht 1.88 m (6' 2.02\")   Wt 127.1 kg (280 lb 3.2 oz)   SpO2 97%   BMI 35.96 kg/m²   24HR INTAKE/OUTPUT:    Intake/Output Summary (Last 24 hours) at 4/26/2025 1407  Last data filed at 4/26/2025 1336  Gross per 24 hour   Intake --   Output 400 ml   Net -400 ml       Neck: No JVD  Chest: Chest is symmetrical,  Lungs: Vesicular breath sounds decreased at the bases. No rales or ronchi.  Heart: Regular rate and rhythm. No S3 gallop. No  murmrur.  Abdomen: Soft, mildly distended, Pleurx catheter in place,   non tender and normal bowel sounds.   Extremeties: +1 pitting edema bilaterally.         Current Facility-Administered Medications   Medication Dose Route Frequency Provider Last Rate Last Admin    terazosin (HYTRIN) capsule 5 mg (Patient Supplied)  5 mg Oral  07:46 AM    TIBC 130 04/25/2025 07:46 AM    FERRITIN 796 04/25/2025 07:46 AM    IRONPERSAT 20 04/25/2025 07:46 AM       No results found for: \"PTHINTRAOP\"    No results found for: \"IPTH\"    No results found for: \"VITD25\"    No results found for: \"IPTH\", \"CAION\"      BMP:   Recent Labs     04/24/25  0709 04/25/25  0746 04/26/25  0454   * 125* 125*   K 4.6 4.9 4.8   CL 95* 94* 92*   CO2 21* 18* 19*   BUN 56* 60* 66*   CREATININE 1.5* 1.4* 1.4*   GLUCOSE 149* 150* 155*   ANIONGAP 11 13 14   MG  --  2.1 2.1   CALCIUM 8.3* 8.3* 8.2*   PHOS  --  4.7* 4.2     RESULTRCNT   No results for input(s): \"URICACID\" in the last 72 hours.  Serum Osmolality:   No results for input(s): \"OSMOS\" in the last 72 hours.      Urine Chemisrty:      Recent Labs     04/23/25  2110   CLUR <20   NAUR <20   KUR 72.2   LABCREAU 224.4   UREAUR 913                                                  No results for input(s): \"MALBCR\", \"LABPROT\" in the last 72 hours.           Assessment: / Plan:      Acute kidney injury.  Acute kidney injury secondary renal hypoperfusion.  Fractional excretion of urea is 12.1%.  The patient has had borderline hypotension with a systolic blood pressure following as low as 91 mmHg.  Creatinine is stable at 1.4 mg/dL. Continue to follow. This is not far from his baseline of 0.9-1.2 mg/dL.    Hyponatremia.  Hyponatremia secondary to acute kidney injury and hepatic dysfunction with malignant ascites.   Serum sodium levels are 125.  Will follow closely. Can continue on lasix though limited by hypotension.    Metabolic acidosis.     Metabolic acidosis with   normal anion gap.  Bicarbonate levels have improved.  Urine studies inconclusive.  Will start sodium bicarbonate supplement to target a bicarb level of 22 mmol/L.    Anemia of unknown etiology .  Anemia possibly related to hepatic disease.  Iron studies are adequate.  Will follow hemoglobin hematocrit.    Edema.  Edema may be largely due to hepatic disease and

## 2025-04-26 NOTE — FLOWSHEET NOTE
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  Generalized weakness continues he does not have any fever chills no nausea vomiting diarrhea no abdominal pain.  Patient does not complain of any shortness of breath does not complain of any chest pain he does chemotherapy and immunotherapy at the [facility].  He is not complaining of any abdominal pain lightheadedness dysuria hematuria hematochezia or melena.  Patient admitted for further evaluation and treatment because of the recurrent ascites I discussed with him regarding the placement of a Pleurx catheter so that he can be drained at home.    Findings:     04/26/25 1519   Skin Integrity Site 2   Skin Integrity Location 2   (blanchable redness)   Location 2 bilateral heels   Wound 04/23/25 Coccyx   Date First Assessed/Time First Assessed: 04/23/25 1604   Present on Original Admission: Yes  Primary Wound Type: (c) Other (Comments)  Location: Coccyx   Wound Image    Wound Etiology Other   Dressing Status Other (Comment)  (zinc)   Dressing/Treatment Zinc paste   Wound Length (cm) 7 cm   Wound Width (cm) 3 cm   Wound Depth (cm) 0.1 cm   Wound Surface Area (cm^2) 21 cm^2   Wound Volume (cm^3) 2.1 cm^3   Wound Assessment Pink/red   Drainage Amount None (dry)   Odor None   Madeline-wound Assessment Blanchable erythema       **Informed Consent**    The patient has given verbal consent to have photos taken of wound and inserted into their chart as part of their permanent medical record for purposes of documentation, treatment management

## 2025-04-27 LAB
ANION GAP SERPL CALCULATED.3IONS-SCNC: 14 MMOL/L (ref 7–16)
BUN SERPL-MCNC: 71 MG/DL (ref 6–23)
CALCIUM SERPL-MCNC: 8.3 MG/DL (ref 8.6–10.2)
CHLORIDE SERPL-SCNC: 91 MMOL/L (ref 98–107)
CO2 SERPL-SCNC: 18 MMOL/L (ref 22–29)
CREAT SERPL-MCNC: 1.3 MG/DL (ref 0.7–1.2)
ERYTHROCYTE [DISTWIDTH] IN BLOOD BY AUTOMATED COUNT: 15 % (ref 11.5–15)
GFR, ESTIMATED: 57 ML/MIN/1.73M2
GLUCOSE BLD-MCNC: 138 MG/DL (ref 74–99)
GLUCOSE BLD-MCNC: 140 MG/DL (ref 74–99)
GLUCOSE BLD-MCNC: 143 MG/DL (ref 74–99)
GLUCOSE BLD-MCNC: 149 MG/DL (ref 74–99)
GLUCOSE SERPL-MCNC: 136 MG/DL (ref 74–99)
HCT VFR BLD AUTO: 32.2 % (ref 37–54)
HGB BLD-MCNC: 11.1 G/DL (ref 12.5–16.5)
MAGNESIUM SERPL-MCNC: 2.2 MG/DL (ref 1.6–2.6)
MCH RBC QN AUTO: 31.4 PG (ref 26–35)
MCHC RBC AUTO-ENTMCNC: 34.5 G/DL (ref 32–34.5)
MCV RBC AUTO: 91.2 FL (ref 80–99.9)
OSMOLALITY UR: 624 MOSM/KG (ref 300–900)
PHOSPHATE SERPL-MCNC: 4.3 MG/DL (ref 2.5–4.5)
PLATELET # BLD AUTO: 129 K/UL (ref 130–450)
PMV BLD AUTO: 8.9 FL (ref 7–12)
POTASSIUM SERPL-SCNC: 5.2 MMOL/L (ref 3.5–5)
RBC # BLD AUTO: 3.53 M/UL (ref 3.8–5.8)
SODIUM SERPL-SCNC: 123 MMOL/L (ref 132–146)
SODIUM UR-SCNC: <20 MMOL/L
WBC OTHER # BLD: 10.2 K/UL (ref 4.5–11.5)

## 2025-04-27 PROCEDURE — 84100 ASSAY OF PHOSPHORUS: CPT

## 2025-04-27 PROCEDURE — 2700000000 HC OXYGEN THERAPY PER DAY

## 2025-04-27 PROCEDURE — 36415 COLL VENOUS BLD VENIPUNCTURE: CPT

## 2025-04-27 PROCEDURE — 83735 ASSAY OF MAGNESIUM: CPT

## 2025-04-27 PROCEDURE — 2500000003 HC RX 250 WO HCPCS: Performed by: INTERNAL MEDICINE

## 2025-04-27 PROCEDURE — 6370000000 HC RX 637 (ALT 250 FOR IP): Performed by: INTERNAL MEDICINE

## 2025-04-27 PROCEDURE — 6370000000 HC RX 637 (ALT 250 FOR IP): Performed by: STUDENT IN AN ORGANIZED HEALTH CARE EDUCATION/TRAINING PROGRAM

## 2025-04-27 PROCEDURE — 6360000002 HC RX W HCPCS: Performed by: INTERNAL MEDICINE

## 2025-04-27 PROCEDURE — 83935 ASSAY OF URINE OSMOLALITY: CPT

## 2025-04-27 PROCEDURE — P9047 ALBUMIN (HUMAN), 25%, 50ML: HCPCS | Performed by: INTERNAL MEDICINE

## 2025-04-27 PROCEDURE — 6370000000 HC RX 637 (ALT 250 FOR IP)

## 2025-04-27 PROCEDURE — 2060000000 HC ICU INTERMEDIATE R&B

## 2025-04-27 PROCEDURE — 82962 GLUCOSE BLOOD TEST: CPT

## 2025-04-27 PROCEDURE — 80048 BASIC METABOLIC PNL TOTAL CA: CPT

## 2025-04-27 PROCEDURE — 84300 ASSAY OF URINE SODIUM: CPT

## 2025-04-27 PROCEDURE — 85027 COMPLETE CBC AUTOMATED: CPT

## 2025-04-27 RX ORDER — MORPHINE SULFATE 2 MG/ML
1 INJECTION, SOLUTION INTRAMUSCULAR; INTRAVENOUS
Status: DISCONTINUED | OUTPATIENT
Start: 2025-04-27 | End: 2025-04-30 | Stop reason: HOSPADM

## 2025-04-27 RX ORDER — ALBUMIN (HUMAN) 12.5 G/50ML
50 SOLUTION INTRAVENOUS ONCE
Status: DISCONTINUED | OUTPATIENT
Start: 2025-04-27 | End: 2025-04-27

## 2025-04-27 RX ORDER — ALBUMIN (HUMAN) 12.5 G/50ML
50 SOLUTION INTRAVENOUS DAILY
Status: COMPLETED | OUTPATIENT
Start: 2025-04-27 | End: 2025-04-28

## 2025-04-27 RX ORDER — SODIUM BICARBONATE 650 MG/1
650 TABLET ORAL 3 TIMES DAILY
Status: DISCONTINUED | OUTPATIENT
Start: 2025-04-27 | End: 2025-04-30 | Stop reason: HOSPADM

## 2025-04-27 RX ORDER — MIDODRINE HYDROCHLORIDE 5 MG/1
5 TABLET ORAL
Status: DISCONTINUED | OUTPATIENT
Start: 2025-04-27 | End: 2025-04-29

## 2025-04-27 RX ORDER — METOPROLOL SUCCINATE 50 MG/1
50 TABLET, EXTENDED RELEASE ORAL 2 TIMES DAILY
Status: DISCONTINUED | OUTPATIENT
Start: 2025-04-27 | End: 2025-04-29

## 2025-04-27 RX ADMIN — PANTOPRAZOLE SODIUM 40 MG: 40 TABLET, DELAYED RELEASE ORAL at 21:17

## 2025-04-27 RX ADMIN — MIDODRINE HYDROCHLORIDE 5 MG: 5 TABLET ORAL at 18:18

## 2025-04-27 RX ADMIN — FUROSEMIDE 40 MG: 10 INJECTION, SOLUTION INTRAMUSCULAR; INTRAVENOUS at 09:39

## 2025-04-27 RX ADMIN — SODIUM BICARBONATE 650 MG: 650 TABLET ORAL at 21:17

## 2025-04-27 RX ADMIN — TERAZOSIN 5 MG: 5 CAPSULE ORAL at 21:22

## 2025-04-27 RX ADMIN — ALBUMIN (HUMAN) 50 G: 0.25 INJECTION, SOLUTION INTRAVENOUS at 11:45

## 2025-04-27 RX ADMIN — ANORECTAL OINTMENT: 15.7; .44; 24; 20.6 OINTMENT TOPICAL at 09:43

## 2025-04-27 RX ADMIN — MORPHINE SULFATE 1 MG: 2 INJECTION, SOLUTION INTRAMUSCULAR; INTRAVENOUS at 22:51

## 2025-04-27 RX ADMIN — ANORECTAL OINTMENT: 15.7; .44; 24; 20.6 OINTMENT TOPICAL at 21:20

## 2025-04-27 RX ADMIN — ACETAMINOPHEN 650 MG: 325 TABLET ORAL at 16:32

## 2025-04-27 RX ADMIN — SODIUM BICARBONATE 650 MG: 650 TABLET ORAL at 09:38

## 2025-04-27 RX ADMIN — MIDODRINE HYDROCHLORIDE 5 MG: 5 TABLET ORAL at 09:39

## 2025-04-27 RX ADMIN — MICONAZOLE NITRATE: 20 POWDER TOPICAL at 09:43

## 2025-04-27 RX ADMIN — ANORECTAL OINTMENT: 15.7; .44; 24; 20.6 OINTMENT TOPICAL at 14:33

## 2025-04-27 RX ADMIN — SODIUM BICARBONATE 650 MG: 650 TABLET ORAL at 16:32

## 2025-04-27 RX ADMIN — SODIUM CHLORIDE, PRESERVATIVE FREE 10 ML: 5 INJECTION INTRAVENOUS at 09:39

## 2025-04-27 RX ADMIN — OXYCODONE 5 MG: 5 TABLET ORAL at 03:03

## 2025-04-27 RX ADMIN — PANTOPRAZOLE SODIUM 40 MG: 40 TABLET, DELAYED RELEASE ORAL at 09:39

## 2025-04-27 RX ADMIN — OXYCODONE 5 MG: 5 TABLET ORAL at 16:32

## 2025-04-27 ASSESSMENT — PAIN DESCRIPTION - LOCATION
LOCATION: ABDOMEN;BACK
LOCATION: ABDOMEN

## 2025-04-27 ASSESSMENT — PAIN SCALES - GENERAL
PAINLEVEL_OUTOF10: 8
PAINLEVEL_OUTOF10: 8

## 2025-04-27 ASSESSMENT — PAIN SCALES - WONG BAKER: WONGBAKER_NUMERICALRESPONSE: NO HURT

## 2025-04-27 ASSESSMENT — PAIN DESCRIPTION - DESCRIPTORS: DESCRIPTORS: CRAMPING;SHARP

## 2025-04-27 NOTE — CONSULTS
Consult Note    Reason for Consult:  Sinus tachycardia    Requesting Physician:  Zack Lucero MD    HISTORY OF PRESENT ILLNESS:    The patient is a 74 y.o. male who I saw in the past    He has problem with coronary artery disease.  He underwent angioplasty and stenting of the right coronary vessels none twice in the past in March 2022 and again in September of last year.    He was diagnosed with esophageal cancer about 6 months ago.  It was felt to be poor candidate for surgery.  He was given chemotherapy or radiation therapy.    He presented to the hospital this time with generalized body weakness with increase in abdominal girth.    He was felt to have malignant ascites    Patient was scheduled for hospice care    On the monitor he was having significant sinus tachycardia with heart is around 120 to 130/min    Cardiac consult was requested    Patient was lying semi upright in bed when I came to see him.  He looked very weak but no acute distress.      Past Medical History:   Diagnosis Date    Arthritis     lower back    Asthma     Atrial fibrillation (HCC)     Diabetes mellitus (HCC)     diet controlled    H/O cardiovascular stress test 06/26/2020    Lexiscan    Hypertension     Sleep apnea        Past Surgical History:   Procedure Laterality Date    CARDIAC PROCEDURE N/A 9/3/2024    Left heart cath / coronary angiography performed by Edwin Smith MD at [facility] CARDIAC CATH/IR    CHOLECYSTECTOMY  1995    IR GUIDED TUNNELED INTRAPERITONEAL CATH W OR WO CONTRAST PERC  4/24/2025    IR GUIDED TUNNELED INTRAPERITONEAL CATH W OR WO CONTRAST PERC 4/24/2025 [facility] CARDIAC CATH/IR LAB    PROSTATECTOMY N/A 10/12/2020    LAPAROSCOPIC ROBOTIC ASSISTED BLADDER DIVERTICULECTOMY WITH FLEXIBLE  CYSTOSCOPY performed by Vic Cadet MD at [facility]    TONSILLECTOMY      TURP  08/04/2017    cr    TURP N/A 6/5/2020    CYSTOSCOPY, RETROGRADE, PYELOGRAM. CYSTOGRAM. TRANSURETHRAL RESECTION PROSTATE performed by Jerson Osuna DO at

## 2025-04-27 NOTE — PROGRESS NOTES
Nephrology Progress Note  Peter Quintana MD    Summary: We have been following for DAX and hyponatremia.    Interval History:    4/26:   Patient seen and examined.  No family member is present at bedside.  Chart reviewed.  Patient has Pleurx drain placement in the abdomen.    Case discussed with nursing staff.  There seems to be some confusion as to whether the patient will be transitioning to hospice measures.  Per the patient's nurse, he stated he wanted to remain full code but I reviewed the oncology note and notes from yesterday which all seem to suggest that the patient will be going home with hospice, initially last evening and then that was postponed to this morning.  Subsequently continues to remain tachycardic and hypotensive.  There is a discharge order in the chart though that apparently also has been helpful as well.  Patient is resting in bed, appears chronically ill.  However not in acute distress.  Denies palpitations, chest fluttering or tightness.  No lightheadedness.  He was up out of bed and did not feel like he was going to fall.    4/27: Patient was seen and examined at bedside.  No family present.  Case discussed with bedside RN and primary physician.  He does not want to be transition to hospice at this time.  He remains a full code though when I was discussing with the patient it appears he is interested in changing his CODE STATUS to DNR CCA.  When prompted he stated he seems to appreciate his poor prognosis and stated he did not want CPR or intubation.  This was discussed in vicinity of the nurse.      PHYSICAL EXAM      Vital SignsBP 96/64   Pulse (!) 130   Temp 97.7 °F (36.5 °C)   Resp 20   Ht 1.88 m (6' 2.02\")   Wt 127.1 kg (280 lb 3.2 oz)   SpO2 94%   BMI 35.96 kg/m²   24HR INTAKE/OUTPUT:    Intake/Output Summary (Last 24 hours) at 4/27/2025 1301  Last data filed at 4/27/2025 0915  Gross per 24 hour   Intake 200 ml   Output 685 ml   Net -485 ml       Neck: No JVD  Chest:  metastatic process.   6. Unchanged abnormal appearance of the bladder with wall thickening   identified along the right lateral aspect. Findings concerning for underlying   malignancy.  Cystography recommended for further evaluation.   7. Diverticulosis with no evidence of diverticulitis.   8. Mild anasarca seen within the soft tissues.         XR CHEST (2 VW)   Final Result   1. Increasing hazy density in the right mid and lower lung. Findings may be   due to atelectasis, pneumonia, or asymmetric edema.   2. Cardiomegaly.               LAB DATA     BMP, Mg, Phos    Lab Results   Component Value Date    CREATININE 1.3 (H) 04/27/2025    CREATININE 1.4 (H) 04/26/2025    CREATININE 1.4 (H) 04/25/2025    CREATININE 1.5 (H) 04/24/2025    CREATININE 1.6 (H) 04/23/2025    CREATININE 1.5 (H) 04/22/2025    CREATININE 1.3 (H) 04/18/2025       CBC:   Recent Labs     04/25/25  0746 04/26/25  0454 04/27/25  0622   WBC 12.5* 12.7* 10.2   HGB 11.4* 10.9* 11.1*    135 129*        Lab Results   Component Value Date/Time    IRON 26 04/25/2025 07:46 AM    TIBC 130 04/25/2025 07:46 AM    FERRITIN 796 04/25/2025 07:46 AM    IRONPERSAT 20 04/25/2025 07:46 AM       No results found for: \"PTHINTRAOP\"    No results found for: \"IPTH\"    No results found for: \"VITD25\"    No results found for: \"IPTH\", \"CAION\"      BMP:   Recent Labs     04/25/25  0746 04/26/25  0454 04/27/25  0622   * 125* 123*   K 4.9 4.8 5.2*   CL 94* 92* 91*   CO2 18* 19* 18*   BUN 60* 66* 71*   CREATININE 1.4* 1.4* 1.3*   GLUCOSE 150* 155* 136*   ANIONGAP 13 14 14   MG 2.1 2.1 2.2   CALCIUM 8.3* 8.2* 8.3*   PHOS 4.7* 4.2 4.3     RESULTRCNT   No results for input(s): \"URICACID\" in the last 72 hours.  Serum Osmolality:   No results for input(s): \"OSMOS\" in the last 72 hours.      Urine Chemisrty:      No results for input(s): \"CLUR\", \"NAUR\", \"KUR\", \"LABCREAU\", \"UREAUR\", \"OSMOU\" in the last 72 hours.                                                 No results for

## 2025-04-27 NOTE — PROGRESS NOTES
Primary Care Physician: Gabe Goodwin MD   Admitting Physician:  Zack Lucero MD  Admission date and time: 4/22/2025 10:21 AM    Room:  [address] [place] [address]  Admitting diagnosis: Hyponatremia [E87.1]  Pericardial effusion [I31.39]  Ascites, malignant (HCC) [R18.0]  Generalized weakness [R53.1]  Malignant ascites (HCC) [R18.0]  Primary cancer of esophagus with metastasis to other site (HCC) [C15.9]  Chronic bilateral pleural effusions [J90]    Patient Name: Herb Rand  MRN: [id number]    Date of Service: 4/27/2025     Subjective:  Herb is a 74 y.o. male who was seen and examined today,4/27/2025, at the bedside. Patient feeling much better today although abdominal distention persist, status post abdominal Pleurx catheter placement discussed with patient regarding hospice he says that the family does not want hospice at this time.    No family present during my examination.    ROS:  General:   Denies chills, fever, malaise, night sweats or weight loss, positive for fatigue and weakness     Psychological:   Denies anxiety, disorientation or hallucinations     ENT:    Denies epistaxis, headaches, vertigo or visual changes     Cardiovascular:   Denies any chest pain, irregular heartbeats, or palpitations. No paroxysmal nocturnal dyspnea.     Respiratory:   Denies shortness of breath, coughing, sputum production, hemoptysis, or wheezing.  No orthopnea.     Gastrointestinal:   Denies nausea, vomiting, diarrhea, or constipation.  Denies any abdominal pain.  Denies change in bowel habits or stools, positive for abdominal distention and fluid     Genito-Urinary:    Denies any urgency, frequency, hematuria.  Voiding without difficulty.     Musculoskeletal:   Denies joint pain, joint stiffness, joint swelling or muscle pain     Neurology:    Denies any headache or focal neurological deficits. No weakness or paresthesia.     Derm:    Denies any rashes, ulcers, or excoriations.  Denies bruising.      Extremities:   Denies

## 2025-04-28 LAB
ANION GAP SERPL CALCULATED.3IONS-SCNC: 11 MMOL/L (ref 7–16)
BUN SERPL-MCNC: 63 MG/DL (ref 6–23)
CALCIUM SERPL-MCNC: 8.4 MG/DL (ref 8.6–10.2)
CHLORIDE SERPL-SCNC: 92 MMOL/L (ref 98–107)
CO2 SERPL-SCNC: 20 MMOL/L (ref 22–29)
CREAT SERPL-MCNC: 1.2 MG/DL (ref 0.7–1.2)
ERYTHROCYTE [DISTWIDTH] IN BLOOD BY AUTOMATED COUNT: 15 % (ref 11.5–15)
GFR, ESTIMATED: 65 ML/MIN/1.73M2
GLUCOSE BLD-MCNC: 125 MG/DL (ref 74–99)
GLUCOSE BLD-MCNC: 140 MG/DL (ref 74–99)
GLUCOSE BLD-MCNC: 146 MG/DL (ref 74–99)
GLUCOSE BLD-MCNC: 159 MG/DL (ref 74–99)
GLUCOSE SERPL-MCNC: 127 MG/DL (ref 74–99)
HCT VFR BLD AUTO: 29.9 % (ref 37–54)
HGB BLD-MCNC: 10 G/DL (ref 12.5–16.5)
MAGNESIUM SERPL-MCNC: 2.4 MG/DL (ref 1.6–2.6)
MCH RBC QN AUTO: 30.7 PG (ref 26–35)
MCHC RBC AUTO-ENTMCNC: 33.4 G/DL (ref 32–34.5)
MCV RBC AUTO: 91.7 FL (ref 80–99.9)
PHOSPHATE SERPL-MCNC: 4.3 MG/DL (ref 2.5–4.5)
PLATELET # BLD AUTO: 126 K/UL (ref 130–450)
PMV BLD AUTO: 9 FL (ref 7–12)
POTASSIUM SERPL-SCNC: 4.8 MMOL/L (ref 3.5–5)
RBC # BLD AUTO: 3.26 M/UL (ref 3.8–5.8)
SODIUM SERPL-SCNC: 123 MMOL/L (ref 132–146)
WBC OTHER # BLD: 9.3 K/UL (ref 4.5–11.5)

## 2025-04-28 PROCEDURE — 6370000000 HC RX 637 (ALT 250 FOR IP): Performed by: INTERNAL MEDICINE

## 2025-04-28 PROCEDURE — 6360000002 HC RX W HCPCS: Performed by: INTERNAL MEDICINE

## 2025-04-28 PROCEDURE — 6370000000 HC RX 637 (ALT 250 FOR IP)

## 2025-04-28 PROCEDURE — 82962 GLUCOSE BLOOD TEST: CPT

## 2025-04-28 PROCEDURE — 80048 BASIC METABOLIC PNL TOTAL CA: CPT

## 2025-04-28 PROCEDURE — 6370000000 HC RX 637 (ALT 250 FOR IP): Performed by: SPECIALIST

## 2025-04-28 PROCEDURE — 85027 COMPLETE CBC AUTOMATED: CPT

## 2025-04-28 PROCEDURE — 6370000000 HC RX 637 (ALT 250 FOR IP): Performed by: STUDENT IN AN ORGANIZED HEALTH CARE EDUCATION/TRAINING PROGRAM

## 2025-04-28 PROCEDURE — 36415 COLL VENOUS BLD VENIPUNCTURE: CPT

## 2025-04-28 PROCEDURE — 2060000000 HC ICU INTERMEDIATE R&B

## 2025-04-28 PROCEDURE — 83735 ASSAY OF MAGNESIUM: CPT

## 2025-04-28 PROCEDURE — 2700000000 HC OXYGEN THERAPY PER DAY

## 2025-04-28 PROCEDURE — 84100 ASSAY OF PHOSPHORUS: CPT

## 2025-04-28 PROCEDURE — P9047 ALBUMIN (HUMAN), 25%, 50ML: HCPCS | Performed by: INTERNAL MEDICINE

## 2025-04-28 RX ORDER — GLUCAGON 1 MG/ML
1 KIT INJECTION PRN
Status: DISCONTINUED | OUTPATIENT
Start: 2025-04-28 | End: 2025-04-30 | Stop reason: HOSPADM

## 2025-04-28 RX ORDER — DEXTROSE MONOHYDRATE 100 MG/ML
INJECTION, SOLUTION INTRAVENOUS CONTINUOUS PRN
Status: DISCONTINUED | OUTPATIENT
Start: 2025-04-28 | End: 2025-04-30 | Stop reason: HOSPADM

## 2025-04-28 RX ORDER — FENTANYL 25 UG/1
1 PATCH TRANSDERMAL
Refills: 0 | Status: DISCONTINUED | OUTPATIENT
Start: 2025-04-28 | End: 2025-04-30 | Stop reason: HOSPADM

## 2025-04-28 RX ADMIN — MICONAZOLE NITRATE: 20 POWDER TOPICAL at 08:58

## 2025-04-28 RX ADMIN — ANORECTAL OINTMENT: 15.7; .44; 24; 20.6 OINTMENT TOPICAL at 17:20

## 2025-04-28 RX ADMIN — ANORECTAL OINTMENT: 15.7; .44; 24; 20.6 OINTMENT TOPICAL at 21:40

## 2025-04-28 RX ADMIN — SODIUM BICARBONATE 650 MG: 650 TABLET ORAL at 15:47

## 2025-04-28 RX ADMIN — MIDODRINE HYDROCHLORIDE 5 MG: 5 TABLET ORAL at 17:21

## 2025-04-28 RX ADMIN — OXYCODONE 5 MG: 5 TABLET ORAL at 21:39

## 2025-04-28 RX ADMIN — METOPROLOL SUCCINATE 50 MG: 50 TABLET, EXTENDED RELEASE ORAL at 08:56

## 2025-04-28 RX ADMIN — PANTOPRAZOLE SODIUM 40 MG: 40 TABLET, DELAYED RELEASE ORAL at 08:57

## 2025-04-28 RX ADMIN — METOPROLOL SUCCINATE 50 MG: 50 TABLET, EXTENDED RELEASE ORAL at 21:39

## 2025-04-28 RX ADMIN — SODIUM BICARBONATE 650 MG: 650 TABLET ORAL at 21:39

## 2025-04-28 RX ADMIN — MORPHINE SULFATE 1 MG: 2 INJECTION, SOLUTION INTRAMUSCULAR; INTRAVENOUS at 08:38

## 2025-04-28 RX ADMIN — MIDODRINE HYDROCHLORIDE 5 MG: 5 TABLET ORAL at 08:57

## 2025-04-28 RX ADMIN — ALBUMIN (HUMAN) 25 G: 0.25 INJECTION, SOLUTION INTRAVENOUS at 08:45

## 2025-04-28 RX ADMIN — OXYCODONE 5 MG: 5 TABLET ORAL at 12:17

## 2025-04-28 RX ADMIN — TERAZOSIN 5 MG: 5 CAPSULE ORAL at 21:40

## 2025-04-28 RX ADMIN — OXYCODONE 5 MG: 5 TABLET ORAL at 04:28

## 2025-04-28 RX ADMIN — SODIUM BICARBONATE 650 MG: 650 TABLET ORAL at 08:57

## 2025-04-28 RX ADMIN — ALBUMIN (HUMAN) 25 G: 0.25 INJECTION, SOLUTION INTRAVENOUS at 11:30

## 2025-04-28 RX ADMIN — MIDODRINE HYDROCHLORIDE 5 MG: 5 TABLET ORAL at 11:45

## 2025-04-28 RX ADMIN — ANORECTAL OINTMENT: 15.7; .44; 24; 20.6 OINTMENT TOPICAL at 08:58

## 2025-04-28 RX ADMIN — PANTOPRAZOLE SODIUM 40 MG: 40 TABLET, DELAYED RELEASE ORAL at 21:39

## 2025-04-28 ASSESSMENT — PAIN DESCRIPTION - FREQUENCY: FREQUENCY: CONTINUOUS

## 2025-04-28 ASSESSMENT — PAIN SCALES - GENERAL
PAINLEVEL_OUTOF10: 8
PAINLEVEL_OUTOF10: 8
PAINLEVEL_OUTOF10: 9
PAINLEVEL_OUTOF10: 8
PAINLEVEL_OUTOF10: 0
PAINLEVEL_OUTOF10: 6
PAINLEVEL_OUTOF10: 0

## 2025-04-28 ASSESSMENT — PAIN DESCRIPTION - LOCATION
LOCATION: ABDOMEN
LOCATION: ABDOMEN
LOCATION: ABDOMEN;FOOT;BACK
LOCATION: ABDOMEN

## 2025-04-28 ASSESSMENT — PAIN DESCRIPTION - PAIN TYPE: TYPE: ACUTE PAIN

## 2025-04-28 ASSESSMENT — PAIN DESCRIPTION - ORIENTATION: ORIENTATION: LEFT;LOWER

## 2025-04-28 ASSESSMENT — PAIN DESCRIPTION - DESCRIPTORS
DESCRIPTORS: DISCOMFORT;SHARP;PRESSURE
DESCRIPTORS: DISCOMFORT;SHARP;JABBING
DESCRIPTORS: SHARP;NAGGING;DISCOMFORT

## 2025-04-28 ASSESSMENT — PAIN DESCRIPTION - ONSET: ONSET: ON-GOING

## 2025-04-28 NOTE — CARE COORDINATION
4/28/25 1300 CM note: no covid testing. IV lasix (on hold). S/P abdominal pleurx catheter placement in IR on 4/24/25, 4350 cc of fluid removed. WILL NEED ORDER WRITTEN FOR FREQUENCY AND AMOUNT TO DRAIN. Hx metastatic gastroesophageal adenocarcinoma, pt follows w/ Dr Munson at the [facility]. Oncology, nephrology, cardiology, wound care, and PT/OT following.  Limited code, meds only. Hospice consult noted-[facility] w/ [facility] is following. Pt and family requesting SNF under Medicare at this time, and depending on his progress will decide on hospice. SNF list was provided and pt/family provided the following choices 1) [facility]- per Cheri they can accept, they will need updated therapy notes, will have a bed available 4/30/25.NO precert needed. WILL NEED SIGNED ISAÍAS AND DC ORDER SO CM CAN COMPLETE HENS. CM will follow. Electronically signed by Janneth Stubbs RN on 4/28/2025 at 1:06 PM

## 2025-04-28 NOTE — PROGRESS NOTES
Primary Care Physician: Gabe Goodwin MD   Admitting Physician:  Zack Lucero MD  Admission date and time: 4/22/2025 10:21 AM    Room:  [address] [place] [address]  Admitting diagnosis: Hyponatremia [E87.1]  Pericardial effusion [I31.39]  Ascites, malignant (HCC) [R18.0]  Generalized weakness [R53.1]  Malignant ascites (HCC) [R18.0]  Primary cancer of esophagus with metastasis to other site (HCC) [C15.9]  Chronic bilateral pleural effusions [J90]    Patient Name: Herb Rand  MRN: [id number]    Date of Service: 4/28/2025     Subjective:  Herb is a 74 y.o. male who was seen and examined today,4/28/2025, at the bedside. Patient feeling much better today although abdominal distention persist, status post abdominal Pleurx catheter placement discussed with patient regarding hospice he says that the family does not want hospice at this time.  Patient complaining of a lot of pain I am going to increase the pain medications pain is mostly in the lower back and abdomen    No family present during my examination.    ROS:  General:   Denies chills, fever, malaise, night sweats or weight loss, positive for fatigue and weakness     Psychological:   Denies anxiety, disorientation or hallucinations     ENT:    Denies epistaxis, headaches, vertigo or visual changes     Cardiovascular:   Denies any chest pain, irregular heartbeats, or palpitations. No paroxysmal nocturnal dyspnea.     Respiratory:   Denies shortness of breath, coughing, sputum production, hemoptysis, or wheezing.  No orthopnea.     Gastrointestinal:   Denies nausea, vomiting, diarrhea, or constipation.  Denies any abdominal pain.  Denies change in bowel habits or stools, positive for abdominal distention and fluid     Genito-Urinary:    Denies any urgency, frequency, hematuria.  Voiding without difficulty.     Musculoskeletal:   Denies joint pain, joint stiffness, joint swelling or muscle pain     Neurology:    Denies any headache or focal neurological deficits. No  family or surrogate(s) is included only if the patient was incapable of providing the necessary information or participating in medical decisions. I also discussed the differential diagnosis and all of the proposed management plans with the patient and individuals accompanying the patient. I am readily available for any further decision-making and intervention.       Electronically signed by Zack Lucero MD on 4/28/2025 at 8:44 AM

## 2025-04-28 NOTE — PROGRESS NOTES
Nephrology Progress Note  Peter Quintana MD    Summary: We have been following for DAX and hyponatremia.    Interval History:    4/26:   Patient seen and examined.  No family member is present at bedside.  Chart reviewed.  Patient has Pleurx drain placement in the abdomen.    Case discussed with nursing staff.  There seems to be some confusion as to whether the patient will be transitioning to hospice measures.  Per the patient's nurse, he stated he wanted to remain full code but I reviewed the oncology note and notes from yesterday which all seem to suggest that the patient will be going home with hospice, initially last evening and then that was postponed to this morning.  Subsequently continues to remain tachycardic and hypotensive.  There is a discharge order in the chart though that apparently also has been helpful as well.  Patient is resting in bed, appears chronically ill.  However not in acute distress.  Denies palpitations, chest fluttering or tightness.  No lightheadedness.  He was up out of bed and did not feel like he was going to fall.    4/27: Patient was seen and examined at bedside.  No family present.  Case discussed with bedside RN and primary physician.  He does not want to be transition to hospice at this time.  He remains a full code though when I was discussing with the patient it appears he is interested in changing his CODE STATUS to DNR CCA.  When prompted he stated he seems to appreciate his poor prognosis and stated he did not want CPR or intubation.  This was discussed in vicinity of the nurse.    4/28: Patient seen and examined at bedside.  He is weak and fatigued.  He denies leg swelling.  No significant abdominal pain.  Changed to limited CODE STATUS.      PHYSICAL EXAM      Vital SignsBP 97/67   Pulse (!) 120   Temp 98.8 °F (37.1 °C) (Oral)   Resp 20   Ht 1.88 m (6' 2.02\")   Wt 127.1 kg (280 lb 3.2 oz)   SpO2 96%   BMI 35.96 kg/m²   24HR INTAKE/OUTPUT:  No intake or

## 2025-04-28 NOTE — PROGRESS NOTES
Subjective:    The patient is resting in bed, on 2L O2, wakes easily to sound of my voice. He is having continuous abdominal pain, states he is not due for pain medication right now but it is helping. He denies n/v/d. Tolerating diet well. He has decided not to proceed with hospice at this time. Instead he has decided to d/c to facility and take it day by day.     Objective:    /67   Pulse (!) 119   Temp 97.5 °F (36.4 °C) (Oral)   Resp 20   Ht 1.88 m (6' 2.02\")   Wt 127.1 kg (280 lb 3.2 oz)   SpO2 94%   BMI 35.96 kg/m²     General: Resting, no acute distress  HEENT: No thrush or mucositis, EOMI, PERRLA  Heart:  RRR, no murmurs, gallops, or rubs.  Lungs:  Diminished bilat bases, no wheeze, rales or rhonchi  Abd: BS present, distended, tender to light palpation, no masses  Extrem:  No clubbing, cyanosis. BLE edema  Lymphatics: No palpable adenopathy in cervical and supraclavicular regions  Skin: Intact, no petechia or purpura    CBC with Differential:    Lab Results   Component Value Date/Time    WBC 10.2 04/27/2025 06:22 AM    RBC 3.53 04/27/2025 06:22 AM    HGB 11.1 04/27/2025 06:22 AM    HCT 32.2 04/27/2025 06:22 AM     04/27/2025 06:22 AM    MCV 91.2 04/27/2025 06:22 AM    MCH 31.4 04/27/2025 06:22 AM    MCHC 34.5 04/27/2025 06:22 AM    RDW 15.0 04/27/2025 06:22 AM    NRBC 1 04/13/2025 08:09 AM    METASPCT 4 04/18/2025 11:33 AM    LYMPHOPCT 3 04/23/2025 05:55 AM    MONOPCT 4 04/23/2025 05:55 AM    MYELOPCT 1 04/18/2025 11:33 AM    EOSPCT 0 04/23/2025 05:55 AM    BASOPCT 0 04/23/2025 05:55 AM    MONOSABS 0.36 04/23/2025 05:55 AM    LYMPHSABS 0.27 04/23/2025 05:55 AM    EOSABS 0.00 04/23/2025 05:55 AM    BASOSABS 0.00 04/23/2025 05:55 AM     CMP:    Lab Results   Component Value Date/Time     04/27/2025 06:22 AM    K 5.2 04/27/2025 06:22 AM    K 3.1 03/24/2022 01:19 PM    CL 91 04/27/2025 06:22 AM    CO2 18 04/27/2025 06:22 AM    BUN 71 04/27/2025 06:22 AM    CREATININE 1.3 04/27/2025  no

## 2025-04-28 NOTE — DISCHARGE INSTR - COC
Continuity of Care Form    Patient Name: Herb Rand   :  1950  MRN:  [id number]    Admit date:  2025  Discharge date:  25    Code Status Order: Limited   Advance Directives:     Admitting Physician:  Zack Lucero MD  PCP: Gabe Goodwin MD    Discharging Nurse: rose mary lorenzo  Discharging Hospital Unit/Room#: 0518/0518-02  Discharging Unit Phone Number: [phone]    Emergency Contact:   Extended Emergency Contact Information  Primary Emergency Contact: An Rand  Address: [address] [place] [address]            [address]  Home Phone: [phone]  Mobile Phone: [phone]  Relation: Spouse   needed? No    Past Surgical History:  Past Surgical History:   Procedure Laterality Date    CARDIAC PROCEDURE N/A 9/3/2024    Left heart cath / coronary angiography performed by Edwin Smith MD at [facility] CARDIAC CATH/IR    CHOLECYSTECTOMY      IR GUIDED TUNNELED INTRAPERITONEAL CATH W OR WO CONTRAST PERC  2025    IR GUIDED TUNNELED INTRAPERITONEAL CATH W OR WO CONTRAST PERC 2025 [facility] CARDIAC CATH/IR LAB    PROSTATECTOMY N/A 10/12/2020    LAPAROSCOPIC ROBOTIC ASSISTED BLADDER DIVERTICULECTOMY WITH FLEXIBLE  CYSTOSCOPY performed by Vic Cadet MD at [facility] OR    TONSILLECTOMY      TURP  2017    cr    TURP N/A 2020    CYSTOSCOPY, RETROGRADE, PYELOGRAM. CYSTOGRAM. TRANSURETHRAL RESECTION PROSTATE performed by Jerson Osuna DO at [facility] OR       Immunization History:   Immunization History   Administered Date(s) Administered    COVID-19, MODERNA Bivalent, (age 12y+), IM, 50 mcg/0.5 mL 2023    COVID-19, MODERNA, , (age 12y+), IM, 50mcg/0.5mL 2023, 2024       Active Problems:  Patient Active Problem List   Diagnosis Code    COVID-19 virus infection U07.1    Sepsis (HCC) A41.9    Paroxysmal atrial fibrillation (HCC) I48.0    Essential hypertension I10    Morbid obesity (HCC) E66.01    Bladder diverticulum N32.3     CAD in native artery I25.10    Acute electrocardiography changes R94.31    Syncope and collapse R55    Pleural effusion J90    Esophageal cancer, stage IV (HCC) C15.9    Ascites, malignant (HCC) R18.0    Primary cancer of esophagus with metastasis to other site (HCC) C15.9    Moderate protein-calorie malnutrition E44.0       Isolation/Infection:   Isolation            No Isolation          Patient Infection Status    None to display              Nurse Assessment:  Last Vital Signs: BP 97/67   Pulse (!) 120   Temp 98.8 °F (37.1 °C) (Oral)   Resp 20   Ht 1.88 m (6' 2.02\")   Wt 127.1 kg (280 lb 3.2 oz)   SpO2 96%   BMI 35.96 kg/m²     Last documented pain score (0-10 scale): Pain Level: 8  Last Weight:   Wt Readings from Last 1 Encounters:   04/26/25 127.1 kg (280 lb 3.2 oz)     Mental Status:  oriented and alert    IV Access:  - pt has a port- not accessed     Nursing Mobility/ADLs:  Walking   Assisted  Transfer  Assisted  Bathing  Dependent  Dressing  Assisted  Toileting  Dependent  Feeding  Assisted  Med Admin  Assisted  Med Delivery   whole    Wound Care Documentation and Therapy:  Wound 04/23/25 Abdomen Lateral;Lower;Right puncture (Active)   Dressing Status Clean;Dry;Intact 04/27/25 2100   Dressing/Treatment ABD 04/27/25 2100   Wound Assessment Pink/red 04/26/25 2000   Number of days: 4       Wound 04/23/25 Coccyx (Active)   Wound Image   04/26/25 1519   Wound Etiology Other 04/26/25 1519   Dressing Status Other (Comment) 04/27/25 0800   Wound Cleansed Cleansed with saline;Other (Comment) 04/27/25 0830   Dressing/Treatment Open to air 04/27/25 2100   Wound Length (cm) 7 cm 04/26/25 1519   Wound Width (cm) 3 cm 04/26/25 1519   Wound Depth (cm) 0.1 cm 04/26/25 1519   Wound Surface Area (cm^2) 21 cm^2 04/26/25 1519   Wound Volume (cm^3) 2.1 cm^3 04/26/25 1519   Wound Assessment Pink/red 04/27/25 2100   Drainage Amount None (dry) 04/26/25 1519   Odor None 04/26/25 1519   Madeline-wound Assessment Blanchable

## 2025-04-28 NOTE — PROGRESS NOTES
Cardiology  Progress Note      SUBJECTIVE:  Generalized body weakness.  No chest pain.  No acute distress.    Current Inpatient Medications  Current Facility-Administered Medications: sodium bicarbonate tablet 650 mg, 650 mg, Oral, TID  metoprolol succinate (TOPROL XL) extended release tablet 50 mg, 50 mg, Oral, BID  midodrine (PROAMATINE) tablet 5 mg, 5 mg, Oral, TID WC  albumin human 25% IV solution 50 g, 50 g, IntraVENous, Daily  morphine (PF) injection 1 mg, 1 mg, IntraVENous, Q2H PRN  menthol-zinc oxide (CALMOSEPTINE) 0.44-20.6 % ointment, , Topical, TID  miconazole (MICOTIN) 2 % powder, , Topical, Daily  terazosin (HYTRIN) capsule 5 mg (Patient Supplied), 5 mg, Oral, Nightly  oxyCODONE (ROXICODONE) immediate release tablet 5 mg, 5 mg, Oral, TID PRN  sodium chloride flush 0.9 % injection 5-40 mL, 5-40 mL, IntraVENous, PRN  0.9 % sodium chloride infusion, , IntraVENous, PRN  potassium chloride (KLOR-CON M) extended release tablet 40 mEq, 40 mEq, Oral, PRN **OR** potassium bicarb-citric acid (EFFER-K) effervescent tablet 40 mEq, 40 mEq, Oral, PRN **OR** potassium chloride 10 mEq/100 mL IVPB (Peripheral Line), 10 mEq, IntraVENous, PRN  potassium chloride 10 mEq/100 mL IVPB (Peripheral Line), 10 mEq, IntraVENous, PRN  magnesium sulfate 2000 mg in 50 mL IVPB premix, 2,000 mg, IntraVENous, PRN  ondansetron (ZOFRAN-ODT) disintegrating tablet 4 mg, 4 mg, Oral, Q8H PRN **OR** ondansetron (ZOFRAN) injection 4 mg, 4 mg, IntraVENous, Q6H PRN  polyethylene glycol (GLYCOLAX) packet 17 g, 17 g, Oral, Daily PRN  acetaminophen (TYLENOL) tablet 650 mg, 650 mg, Oral, Q6H PRN **OR** acetaminophen (TYLENOL) suppository 650 mg, 650 mg, Rectal, Q6H PRN  diphenhydrAMINE (BENADRYL) tablet 50 mg, 50 mg, Oral, Q6H PRN  pantoprazole (PROTONIX) tablet 40 mg, 40 mg, Oral, BID  [Held by provider] furosemide (LASIX) injection 40 mg, 40 mg, IntraVENous, Daily  albuterol (PROVENTIL) (2.5 MG/3ML) 0.083% nebulizer solution 2.5 mg, 2.5 mg,

## 2025-04-29 LAB
ANION GAP SERPL CALCULATED.3IONS-SCNC: 13 MMOL/L (ref 7–16)
BUN SERPL-MCNC: 67 MG/DL (ref 6–23)
CALCIUM SERPL-MCNC: 8.2 MG/DL (ref 8.6–10.2)
CHLORIDE SERPL-SCNC: 90 MMOL/L (ref 98–107)
CO2 SERPL-SCNC: 20 MMOL/L (ref 22–29)
CREAT SERPL-MCNC: 1.3 MG/DL (ref 0.7–1.2)
ERYTHROCYTE [DISTWIDTH] IN BLOOD BY AUTOMATED COUNT: 14.9 % (ref 11.5–15)
GFR, ESTIMATED: 60 ML/MIN/1.73M2
GLUCOSE BLD-MCNC: 125 MG/DL (ref 74–99)
GLUCOSE BLD-MCNC: 147 MG/DL (ref 74–99)
GLUCOSE BLD-MCNC: 154 MG/DL (ref 74–99)
GLUCOSE BLD-MCNC: 158 MG/DL (ref 74–99)
GLUCOSE BLD-MCNC: 176 MG/DL (ref 74–99)
GLUCOSE SERPL-MCNC: 170 MG/DL (ref 74–99)
HCT VFR BLD AUTO: 31 % (ref 37–54)
HGB BLD-MCNC: 10.3 G/DL (ref 12.5–16.5)
MAGNESIUM SERPL-MCNC: 2.3 MG/DL (ref 1.6–2.6)
MCH RBC QN AUTO: 30.9 PG (ref 26–35)
MCHC RBC AUTO-ENTMCNC: 33.2 G/DL (ref 32–34.5)
MCV RBC AUTO: 93.1 FL (ref 80–99.9)
PHOSPHATE SERPL-MCNC: 4.8 MG/DL (ref 2.5–4.5)
PLATELET # BLD AUTO: 125 K/UL (ref 130–450)
PMV BLD AUTO: 8.6 FL (ref 7–12)
POTASSIUM SERPL-SCNC: 4.9 MMOL/L (ref 3.5–5)
RBC # BLD AUTO: 3.33 M/UL (ref 3.8–5.8)
SODIUM SERPL-SCNC: 123 MMOL/L (ref 132–146)
WBC OTHER # BLD: 9.7 K/UL (ref 4.5–11.5)

## 2025-04-29 PROCEDURE — 84100 ASSAY OF PHOSPHORUS: CPT

## 2025-04-29 PROCEDURE — 6370000000 HC RX 637 (ALT 250 FOR IP): Performed by: SPECIALIST

## 2025-04-29 PROCEDURE — 6370000000 HC RX 637 (ALT 250 FOR IP): Performed by: STUDENT IN AN ORGANIZED HEALTH CARE EDUCATION/TRAINING PROGRAM

## 2025-04-29 PROCEDURE — 80048 BASIC METABOLIC PNL TOTAL CA: CPT

## 2025-04-29 PROCEDURE — 82962 GLUCOSE BLOOD TEST: CPT

## 2025-04-29 PROCEDURE — 36415 COLL VENOUS BLD VENIPUNCTURE: CPT

## 2025-04-29 PROCEDURE — 85027 COMPLETE CBC AUTOMATED: CPT

## 2025-04-29 PROCEDURE — 83735 ASSAY OF MAGNESIUM: CPT

## 2025-04-29 PROCEDURE — 2700000000 HC OXYGEN THERAPY PER DAY

## 2025-04-29 PROCEDURE — 97110 THERAPEUTIC EXERCISES: CPT

## 2025-04-29 PROCEDURE — 6360000002 HC RX W HCPCS: Performed by: INTERNAL MEDICINE

## 2025-04-29 PROCEDURE — 6370000000 HC RX 637 (ALT 250 FOR IP): Performed by: INTERNAL MEDICINE

## 2025-04-29 PROCEDURE — 2500000003 HC RX 250 WO HCPCS: Performed by: INTERNAL MEDICINE

## 2025-04-29 PROCEDURE — P9047 ALBUMIN (HUMAN), 25%, 50ML: HCPCS | Performed by: INTERNAL MEDICINE

## 2025-04-29 PROCEDURE — 2060000000 HC ICU INTERMEDIATE R&B

## 2025-04-29 PROCEDURE — 6370000000 HC RX 637 (ALT 250 FOR IP)

## 2025-04-29 RX ORDER — ALBUMIN (HUMAN) 12.5 G/50ML
50 SOLUTION INTRAVENOUS ONCE
Status: COMPLETED | OUTPATIENT
Start: 2025-04-29 | End: 2025-04-29

## 2025-04-29 RX ORDER — METOPROLOL SUCCINATE 100 MG/1
100 TABLET, EXTENDED RELEASE ORAL 2 TIMES DAILY
Status: DISCONTINUED | OUTPATIENT
Start: 2025-04-29 | End: 2025-04-30 | Stop reason: HOSPADM

## 2025-04-29 RX ORDER — MIDODRINE HYDROCHLORIDE 10 MG/1
10 TABLET ORAL
Status: DISCONTINUED | OUTPATIENT
Start: 2025-04-29 | End: 2025-04-30 | Stop reason: HOSPADM

## 2025-04-29 RX ADMIN — ALBUMIN (HUMAN) 50 G: 0.25 INJECTION, SOLUTION INTRAVENOUS at 15:31

## 2025-04-29 RX ADMIN — SODIUM BICARBONATE 650 MG: 650 TABLET ORAL at 15:13

## 2025-04-29 RX ADMIN — TERAZOSIN 5 MG: 5 CAPSULE ORAL at 20:38

## 2025-04-29 RX ADMIN — SODIUM BICARBONATE 650 MG: 650 TABLET ORAL at 20:38

## 2025-04-29 RX ADMIN — ACETAMINOPHEN 650 MG: 325 TABLET ORAL at 17:29

## 2025-04-29 RX ADMIN — OXYCODONE 5 MG: 5 TABLET ORAL at 08:21

## 2025-04-29 RX ADMIN — MIDODRINE HYDROCHLORIDE 10 MG: 10 TABLET ORAL at 16:18

## 2025-04-29 RX ADMIN — SODIUM CHLORIDE, PRESERVATIVE FREE 10 ML: 5 INJECTION INTRAVENOUS at 08:10

## 2025-04-29 RX ADMIN — SODIUM BICARBONATE 650 MG: 650 TABLET ORAL at 08:09

## 2025-04-29 RX ADMIN — METOPROLOL SUCCINATE 100 MG: 100 TABLET, EXTENDED RELEASE ORAL at 20:38

## 2025-04-29 RX ADMIN — ANORECTAL OINTMENT: 15.7; .44; 24; 20.6 OINTMENT TOPICAL at 08:10

## 2025-04-29 RX ADMIN — ANORECTAL OINTMENT: 15.7; .44; 24; 20.6 OINTMENT TOPICAL at 20:40

## 2025-04-29 RX ADMIN — MIDODRINE HYDROCHLORIDE 10 MG: 10 TABLET ORAL at 12:24

## 2025-04-29 RX ADMIN — ANORECTAL OINTMENT: 15.7; .44; 24; 20.6 OINTMENT TOPICAL at 15:16

## 2025-04-29 RX ADMIN — MIDODRINE HYDROCHLORIDE 10 MG: 10 TABLET ORAL at 08:09

## 2025-04-29 RX ADMIN — MICONAZOLE NITRATE: 20 POWDER TOPICAL at 08:11

## 2025-04-29 RX ADMIN — PANTOPRAZOLE SODIUM 40 MG: 40 TABLET, DELAYED RELEASE ORAL at 08:09

## 2025-04-29 RX ADMIN — PANTOPRAZOLE SODIUM 40 MG: 40 TABLET, DELAYED RELEASE ORAL at 20:38

## 2025-04-29 RX ADMIN — METOPROLOL SUCCINATE 100 MG: 100 TABLET, EXTENDED RELEASE ORAL at 08:09

## 2025-04-29 ASSESSMENT — PAIN DESCRIPTION - PAIN TYPE: TYPE: ACUTE PAIN

## 2025-04-29 ASSESSMENT — PAIN DESCRIPTION - FREQUENCY: FREQUENCY: CONTINUOUS

## 2025-04-29 ASSESSMENT — PAIN DESCRIPTION - DESCRIPTORS: DESCRIPTORS: SHARP;PRESSURE

## 2025-04-29 ASSESSMENT — PAIN DESCRIPTION - ONSET: ONSET: ON-GOING

## 2025-04-29 ASSESSMENT — PAIN DESCRIPTION - ORIENTATION
ORIENTATION: RIGHT;LEFT;UPPER;LOWER;MID
ORIENTATION: RIGHT;LEFT;UPPER

## 2025-04-29 ASSESSMENT — PAIN SCALES - GENERAL
PAINLEVEL_OUTOF10: 8
PAINLEVEL_OUTOF10: 4
PAINLEVEL_OUTOF10: 3

## 2025-04-29 ASSESSMENT — PAIN DESCRIPTION - LOCATION
LOCATION: ABDOMEN
LOCATION: ABDOMEN

## 2025-04-29 ASSESSMENT — PAIN - FUNCTIONAL ASSESSMENT: PAIN_FUNCTIONAL_ASSESSMENT: PREVENTS OR INTERFERES SOME ACTIVE ACTIVITIES AND ADLS

## 2025-04-29 NOTE — PROGRESS NOTES
OCCUPATIONAL THERAPY TREATMENT NOTE  [facility]  [address] [facility]         Date:2025                                                   Patient Name: Herb Rand     MRN: [id number]     : 1950     Room: [address] [place] [address]       Evaluating OT: Georgia Skelton, OTR/L; [id number]       Referring Provider and Orders/Date:    OT eval and treat  Start:  25,   End:  25,   ONE TIME,   Standing Count:  1 Occurrences,   R       Zack Lucero MD    Recommended placement: home with hospice as requested       Diagnosis:   1. Hyponatremia    2. Chronic bilateral pleural effusions    3. Pericardial effusion    4. Malignant ascites (HCC)    5. Generalized weakness         Surgery: S/P abdominal pleurx catheter placement in IR on 25       Pertinent Medical History:        Past Medical History:   Diagnosis Date    Arthritis     lower back    Asthma     Atrial fibrillation (HCC)     Diabetes mellitus (HCC)     diet controlled    H/O cardiovascular stress test 2020    Lexiscan    Hypertension     Sleep apnea           Past Surgical History:   Procedure Laterality Date    CARDIAC PROCEDURE N/A 9/3/2024    Left heart cath / coronary angiography performed by Edwin Smith MD at [facility] CARDIAC CATH/IR    CHOLECYSTECTOMY      IR GUIDED TUNNELED INTRAPERITONEAL CATH W OR WO CONTRAST PERC  2025    IR GUIDED TUNNELED INTRAPERITONEAL CATH W OR WO CONTRAST PERC 2025 [facility] CARDIAC CATH/IR LAB    PROSTATECTOMY N/A 10/12/2020    LAPAROSCOPIC ROBOTIC ASSISTED BLADDER DIVERTICULECTOMY WITH FLEXIBLE  CYSTOSCOPY performed by Vic Cadet MD at [facility] OR    TONSILLECTOMY      TURP  2017    cr    TURP N/A 2020    CYSTOSCOPY, RETROGRADE, PYELOGRAM. CYSTOGRAM. TRANSURETHRAL RESECTION PROSTATE performed by Jerson Osuna DO at [facility] OR       Precautions:  Fall Risk, metastatic gastroesophageal adenocarcinoma, 4L, pleurx cathter,

## 2025-04-29 NOTE — PLAN OF CARE
Problem: Chronic Conditions and Co-morbidities  Goal: Patient's chronic conditions and co-morbidity symptoms are monitored and maintained or improved  Outcome: Progressing     Problem: Discharge Planning  Goal: Discharge to home or other facility with appropriate resources  Outcome: Progressing     Problem: Skin/Tissue Integrity  Goal: Skin integrity remains intact  Description: 1.  Monitor for areas of redness and/or skin breakdown2.  Assess vascular access sites hourly3.  Every 4-6 hours minimum:  Change oxygen saturation probe site4.  Every 4-6 hours:  If on nasal continuous positive airway pressure, respiratory therapy assess nares and determine need for appliance change or resting period  Outcome: Progressing     Problem: ABCDS Injury Assessment  Goal: Absence of physical injury  Outcome: Progressing     Problem: Safety - Adult  Goal: Free from fall injury  Outcome: Progressing     Problem: Nutrition Deficit:  Goal: Optimize nutritional status  Outcome: Progressing     Problem: Pain  Goal: Verbalizes/displays adequate comfort level or baseline comfort level  Outcome: Progressing     Problem: Skin/Tissue Integrity - Adult  Goal: Skin integrity remains intact  Description: 1.  Monitor for areas of redness and/or skin breakdown2.  Assess vascular access sites hourly3.  Every 4-6 hours minimum:  Change oxygen saturation probe site4.  Every 4-6 hours:  If on nasal continuous positive airway pressure, respiratory therapy assess nares and determine need for appliance change or resting period  Outcome: Progressing     Problem: Gastrointestinal - Adult  Goal: Minimal or absence of nausea and vomiting  Outcome: Progressing

## 2025-04-29 NOTE — PROGRESS NOTES
Cardiology  Progress Note      SUBJECTIVE:  No chest pain. No dyspnea at rest.  Very weak.      Current Inpatient Medications  Current Facility-Administered Medications: fentaNYL (DURAGESIC) 25 MCG/HR 1 patch, 1 patch, TransDERmal, Q72H  glucose chewable tablet 16 g, 4 tablet, Oral, PRN  dextrose bolus 10% 125 mL, 125 mL, IntraVENous, PRN **OR** dextrose bolus 10% 250 mL, 250 mL, IntraVENous, PRN  glucagon injection 1 mg, 1 mg, SubCUTAneous, PRN  dextrose 10 % infusion, , IntraVENous, Continuous PRN  sodium bicarbonate tablet 650 mg, 650 mg, Oral, TID  metoprolol succinate (TOPROL XL) extended release tablet 50 mg, 50 mg, Oral, BID  midodrine (PROAMATINE) tablet 5 mg, 5 mg, Oral, TID WC  morphine (PF) injection 1 mg, 1 mg, IntraVENous, Q2H PRN  menthol-zinc oxide (CALMOSEPTINE) 0.44-20.6 % ointment, , Topical, TID  miconazole (MICOTIN) 2 % powder, , Topical, Daily  terazosin (HYTRIN) capsule 5 mg (Patient Supplied), 5 mg, Oral, Nightly  oxyCODONE (ROXICODONE) immediate release tablet 5 mg, 5 mg, Oral, TID PRN  sodium chloride flush 0.9 % injection 5-40 mL, 5-40 mL, IntraVENous, PRN  0.9 % sodium chloride infusion, , IntraVENous, PRN  potassium chloride (KLOR-CON M) extended release tablet 40 mEq, 40 mEq, Oral, PRN **OR** potassium bicarb-citric acid (EFFER-K) effervescent tablet 40 mEq, 40 mEq, Oral, PRN **OR** potassium chloride 10 mEq/100 mL IVPB (Peripheral Line), 10 mEq, IntraVENous, PRN  potassium chloride 10 mEq/100 mL IVPB (Peripheral Line), 10 mEq, IntraVENous, PRN  magnesium sulfate 2000 mg in 50 mL IVPB premix, 2,000 mg, IntraVENous, PRN  ondansetron (ZOFRAN-ODT) disintegrating tablet 4 mg, 4 mg, Oral, Q8H PRN **OR** ondansetron (ZOFRAN) injection 4 mg, 4 mg, IntraVENous, Q6H PRN  polyethylene glycol (GLYCOLAX) packet 17 g, 17 g, Oral, Daily PRN  acetaminophen (TYLENOL) tablet 650 mg, 650 mg, Oral, Q6H PRN **OR** acetaminophen (TYLENOL) suppository 650 mg, 650 mg, Rectal, Q6H PRN  diphenhydrAMINE

## 2025-04-29 NOTE — PROGRESS NOTES
Nephrology Progress Note  Peter Quintana MD    Summary: We have been following for DAX and hyponatremia.    Interval History:    4/26:   Patient seen and examined.  No family member is present at bedside.  Chart reviewed.  Patient has Pleurx drain placement in the abdomen.    Case discussed with nursing staff.  There seems to be some confusion as to whether the patient will be transitioning to hospice measures.  Per the patient's nurse, he stated he wanted to remain full code but I reviewed the oncology note and notes from yesterday which all seem to suggest that the patient will be going home with hospice, initially last evening and then that was postponed to this morning.  Subsequently continues to remain tachycardic and hypotensive.  There is a discharge order in the chart though that apparently also has been helpful as well.  Patient is resting in bed, appears chronically ill.  However not in acute distress.  Denies palpitations, chest fluttering or tightness.  No lightheadedness.  He was up out of bed and did not feel like he was going to fall.    4/27: Patient was seen and examined at bedside.  No family present.  Case discussed with bedside RN and primary physician.  He does not want to be transition to hospice at this time.  He remains a full code though when I was discussing with the patient it appears he is interested in changing his CODE STATUS to DNR CCA.  When prompted he stated he seems to appreciate his poor prognosis and stated he did not want CPR or intubation.  This was discussed in vicinity of the nurse.    4/28: Patient seen and examined at bedside.  He is weak and fatigued.  He denies leg swelling.  No significant abdominal pain.  Changed to limited CODE STATUS.    4/29: Patient seen and examined. No family at bedside. Patient still feels weak.      PHYSICAL EXAM      Vital SignsBP 103/73   Pulse (!) 115   Temp 97.5 °F (36.4 °C)   Resp 18   Ht 1.88 m (6' 2.02\")   Wt 124.7 kg (275

## 2025-04-29 NOTE — PROGRESS NOTES
Primary Care Physician: Gabe Goodwin MD   Admitting Physician:  Zack Lucero MD  Admission date and time: 4/22/2025 10:21 AM    Room:  [address] [place] [address]  Admitting diagnosis: Hyponatremia [E87.1]  Pericardial effusion [I31.39]  Ascites, malignant (HCC) [R18.0]  Generalized weakness [R53.1]  Malignant ascites (HCC) [R18.0]  Primary cancer of esophagus with metastasis to other site (HCC) [C15.9]  Chronic bilateral pleural effusions [J90]    Patient Name: Herb Rand  MRN: [id number]    Date of Service: 4/29/2025     Subjective:  Herb is a 74 y.o. male who was seen and examined today,4/29/2025, at the bedside. Patient feeling much better today although abdominal distention persist, status post abdominal Pleurx catheter placement discussed with patient regarding hospice he says that the family does not want hospice at this time.  Patient complaining of a lot of pain I am going to increase the pain medications pain is mostly in the lower back and abdomen    No family present during my examination.    ROS:  General:   Denies chills, fever, malaise, night sweats or weight loss, positive for fatigue and weakness     Psychological:   Denies anxiety, disorientation or hallucinations     ENT:    Denies epistaxis, headaches, vertigo or visual changes     Cardiovascular:   Denies any chest pain, irregular heartbeats, or palpitations. No paroxysmal nocturnal dyspnea.     Respiratory:   Denies shortness of breath, coughing, sputum production, hemoptysis, or wheezing.  No orthopnea.     Gastrointestinal:   Denies nausea, vomiting, diarrhea, or constipation.  Denies any abdominal pain.  Denies change in bowel habits or stools, positive for abdominal distention and fluid     Genito-Urinary:    Denies any urgency, frequency, hematuria.  Voiding without difficulty.     Musculoskeletal:   Denies joint pain, joint stiffness, joint swelling or muscle pain     Neurology:    Denies any headache or focal neurological deficits. No

## 2025-04-29 NOTE — PLAN OF CARE
Problem: Chronic Conditions and Co-morbidities  Goal: Patient's chronic conditions and co-morbidity symptoms are monitored and maintained or improved  Outcome: Progressing  Flowsheets  Taken 4/29/2025 1542 by Caitlyn Vuong RN  Care Plan - Patient's Chronic Conditions and Co-Morbidity Symptoms are Monitored and Maintained or Improved: Monitor and assess patient's chronic conditions and comorbid symptoms for stability, deterioration, or improvement  Taken 4/29/2025 0809 by Cheri Servin RN  Care Plan - Patient's Chronic Conditions and Co-Morbidity Symptoms are Monitored and Maintained or Improved: Monitor and assess patient's chronic conditions and comorbid symptoms for stability, deterioration, or improvement     Problem: Discharge Planning  Goal: Discharge to home or other facility with appropriate resources  Outcome: Progressing  Flowsheets  Taken 4/29/2025 1542 by Caitlyn Vuong RN  Discharge to home or other facility with appropriate resources: Identify barriers to discharge with patient and caregiver  Taken 4/29/2025 0809 by Cheri Servin RN  Discharge to home or other facility with appropriate resources: Identify barriers to discharge with patient and caregiver     Problem: Skin/Tissue Integrity  Goal: Skin integrity remains intact  Description: 1.  Monitor for areas of redness and/or skin breakdown2.  Assess vascular access sites hourly3.  Every 4-6 hours minimum:  Change oxygen saturation probe site4.  Every 4-6 hours:  If on nasal continuous positive airway pressure, respiratory therapy assess nares and determine need for appliance change or resting period  Outcome: Progressing  Flowsheets  Taken 4/29/2025 1542 by Caitlyn Vuong RN  Skin Integrity Remains Intact: Monitor for areas of redness and/or skin breakdown  Taken 4/29/2025 0809 by Cheri Servin RN  Skin Integrity Remains Intact: Monitor for areas of redness and/or skin breakdown     Problem: ABCDS Injury Assessment  Goal:  Absence of physical injury  Outcome: Progressing     Problem: Safety - Adult  Goal: Free from fall injury  Outcome: Progressing     Problem: Nutrition Deficit:  Goal: Optimize nutritional status  Outcome: Progressing     Problem: Pain  Goal: Verbalizes/displays adequate comfort level or baseline comfort level  Outcome: Progressing  Flowsheets (Taken 4/29/2025 0809 by Cheri Servin RN)  Verbalizes/displays adequate comfort level or baseline comfort level:   Encourage patient to monitor pain and request assistance   Assess pain using appropriate pain scale     Problem: Skin/Tissue Integrity - Adult  Goal: Skin integrity remains intact  Description: 1.  Monitor for areas of redness and/or skin breakdown2.  Assess vascular access sites hourly3.  Every 4-6 hours minimum:  Change oxygen saturation probe site4.  Every 4-6 hours:  If on nasal continuous positive airway pressure, respiratory therapy assess nares and determine need for appliance change or resting period  Outcome: Progressing  Flowsheets  Taken 4/29/2025 1542 by Caitlyn Vuong RN  Skin Integrity Remains Intact: Monitor for areas of redness and/or skin breakdown  Taken 4/29/2025 0809 by Cheri Servin RN  Skin Integrity Remains Intact: Monitor for areas of redness and/or skin breakdown     Problem: Gastrointestinal - Adult  Goal: Minimal or absence of nausea and vomiting  Outcome: Progressing

## 2025-04-29 NOTE — PROGRESS NOTES
Patient refused every 2 hours turning, benefits and risk explained, he states \" I want to be off from the wedges for now\".

## 2025-04-29 NOTE — PROGRESS NOTES
Physical Therapy Treatment Note/Plan of Care    Room #:  0518/0518-02  Patient Name: Herb Rand  YOB: 1950  MRN: [id number]    Date of Service: 4/29/2025     Tentative placement recommendation: Subacute Rehab   Equipment recommendation: Patient has needed equipment       Evaluating Physical Therapist: Herb Wynn, PT Lic.  #[id number]      Specific Provider Orders/Date/Referring Provider :  04/22/25 2000    PT evaluation and treat  Start:  04/22/25 2000,   End:  04/22/25 2000,   ONE TIME,   Standing Count:  1 Occurrences,   R       Zack Lucero MD    Admitting Diagnosis:   Hyponatremia [E87.1]  Pericardial effusion [I31.39]  Ascites, malignant (HCC) [R18.0]  Generalized weakness [R53.1]  Malignant ascites (HCC) [R18.0]  Primary cancer of esophagus with metastasis to other site (HCC) [C15.9]  Chronic bilateral pleural effusions [J90]       Surgery: none  Visit Diagnoses         Codes      Hyponatremia    -  Primary E87.1      Chronic bilateral pleural effusions     J90      Pericardial effusion     I31.39      Malignant ascites (HCC)     R18.0      Generalized weakness     R53.1            Patient Active Problem List   Diagnosis    COVID-19 virus infection    Sepsis (HCC)    Paroxysmal atrial fibrillation (HCC)    Essential hypertension    Morbid obesity (HCC)    Bladder diverticulum    CAD in native artery    Acute electrocardiography changes    Syncope and collapse    Pleural effusion    Esophageal cancer, stage IV (HCC)    Ascites, malignant (HCC)    Primary cancer of esophagus with metastasis to other site (HCC)    Moderate protein-calorie malnutrition        ASSESSMENT of Current Deficits Patient exhibits decreased strength, balance, and endurance impairing functional mobility, transfers, gait , gait distance, and tolerance to activity. Declined all activities except bed exercises; stated that it felt good with movement to bilateral LE, x 1 small complaint near left lower abdomen patient just

## 2025-04-29 NOTE — PROGRESS NOTES
Subjective:    The patient is awake and alert. He states no issues through the night but feels like he has become more lethargic and weak throughout the morning. Also states he is supposed to have fluid drained today, hoping this helps with discomfort. Denies n/v/d.  Tolerating diet.     Objective:    BP 98/74   Pulse (!) 127   Temp 97.7 °F (36.5 °C) (Oral)   Resp 18   Ht 1.88 m (6' 2.02\")   Wt 124.7 kg (275 lb)   SpO2 94%   BMI 35.29 kg/m²     General: Alert, oriented, no acute distress. Appears ill  HEENT: No thrush or mucositis, EOMI, PERRLA  Heart:  RRR, no murmurs, gallops, or rubs.  Lungs:  Diminished bases, no wheeze, rales or rhonchi  Abd: BS present, distended, tender with light palpation,  no masses  Extrem:  No clubbing, cyanosis. Trace BLE edema  Lymphatics: No palpable adenopathy in cervical and supraclavicular regions  Skin: Intact, no petechia or purpura    CBC with Differential:    Lab Results   Component Value Date/Time    WBC 9.3 04/28/2025 09:42 AM    RBC 3.26 04/28/2025 09:42 AM    HGB 10.0 04/28/2025 09:42 AM    HCT 29.9 04/28/2025 09:42 AM     04/28/2025 09:42 AM    MCV 91.7 04/28/2025 09:42 AM    MCH 30.7 04/28/2025 09:42 AM    MCHC 33.4 04/28/2025 09:42 AM    RDW 15.0 04/28/2025 09:42 AM    NRBC 1 04/13/2025 08:09 AM    METASPCT 4 04/18/2025 11:33 AM    LYMPHOPCT 3 04/23/2025 05:55 AM    MONOPCT 4 04/23/2025 05:55 AM    MYELOPCT 1 04/18/2025 11:33 AM    EOSPCT 0 04/23/2025 05:55 AM    BASOPCT 0 04/23/2025 05:55 AM    MONOSABS 0.36 04/23/2025 05:55 AM    LYMPHSABS 0.27 04/23/2025 05:55 AM    EOSABS 0.00 04/23/2025 05:55 AM    BASOSABS 0.00 04/23/2025 05:55 AM     CMP:    Lab Results   Component Value Date/Time     04/28/2025 09:42 AM    K 4.8 04/28/2025 09:42 AM    K 3.1 03/24/2022 01:19 PM    CL 92 04/28/2025 09:42 AM    CO2 20 04/28/2025 09:42 AM    BUN 63 04/28/2025 09:42 AM    CREATININE 1.2 04/28/2025 09:42 AM    GFRAA >60 03/26/2022 05:15 AM    LABGLOM 65 04/28/2025  pericardial effusion.   3. Moderate amount of ascites identified within the abdomen.   4. Extensive omental caking stable and unchanged when compared to the prior   study consistent with metastatic disease.   5. Stable small lymph nodes identified scattered throughout the   retroperitoneum and celiac axis concerning for metastatic process.   6. Unchanged abnormal appearance of the bladder with wall thickening   identified along the right lateral aspect. Findings concerning for underlying   malignancy.  Cystography recommended for further evaluation.   7. Diverticulosis with no evidence of diverticulitis.   8. Mild anasarca seen within the soft tissues.         XR CHEST (2 VW)   Final Result   1. Increasing hazy density in the right mid and lower lung. Findings may be   due to atelectasis, pneumonia, or asymmetric edema.   2. Cardiomegaly.             Assessment:    Principal Problem:    Ascites, malignant (HCC)  Active Problems:    Primary cancer of esophagus with metastasis to other site (HCC)  Resolved Problems:    * No resolved hospital problems. *    74 year old gentleman known to Dr. Sheppard with metastatic gastroesophageal  adenocarcinoma, pMMR. HER2 1+, PDL1 90%. 10/21/24 EGD: GE junction tumor. Biopsy: poorly differentiated invasive   adenocarcinoma with ulceration.   10/31/24 PET Multiple FDG avid left supraclavicular, paraesophageal,  gastrohepatic, periportal, retroperitoneal and peripancreatic nodes likely   representing nayeli metastasis.   3/11/25 started palliative chemoimmunotherapy with FOLFOX and Opdivo   (nivolumab) regimen.   3rd cycle was delayed per patients request.   4/4/25 therapeutic paracentesis he had 7,550 L blood fluid removed. He felt better for a couple days but recently started feeling his abdomen get bigger and increase in BLE edema.      Recent hospital admission for sob and BLE, recurrent malignant ascites. Unfortunately recent scans reveal clear evidence of rapid disease

## 2025-04-30 VITALS
OXYGEN SATURATION: 94 % | WEIGHT: 275 LBS | SYSTOLIC BLOOD PRESSURE: 94 MMHG | TEMPERATURE: 97.7 F | HEIGHT: 74 IN | HEART RATE: 109 BPM | DIASTOLIC BLOOD PRESSURE: 68 MMHG | RESPIRATION RATE: 20 BRPM | BODY MASS INDEX: 35.29 KG/M2

## 2025-04-30 LAB
ANION GAP SERPL CALCULATED.3IONS-SCNC: 12 MMOL/L (ref 7–16)
BUN SERPL-MCNC: 67 MG/DL (ref 6–23)
CALCIUM SERPL-MCNC: 8.3 MG/DL (ref 8.6–10.2)
CHLORIDE SERPL-SCNC: 91 MMOL/L (ref 98–107)
CO2 SERPL-SCNC: 21 MMOL/L (ref 22–29)
CREAT SERPL-MCNC: 1.3 MG/DL (ref 0.7–1.2)
ERYTHROCYTE [DISTWIDTH] IN BLOOD BY AUTOMATED COUNT: 15.1 % (ref 11.5–15)
GFR, ESTIMATED: 60 ML/MIN/1.73M2
GLUCOSE BLD-MCNC: 125 MG/DL (ref 74–99)
GLUCOSE BLD-MCNC: 162 MG/DL (ref 74–99)
GLUCOSE SERPL-MCNC: 117 MG/DL (ref 74–99)
HCT VFR BLD AUTO: 28.4 % (ref 37–54)
HGB BLD-MCNC: 9.8 G/DL (ref 12.5–16.5)
MAGNESIUM SERPL-MCNC: 2.4 MG/DL (ref 1.6–2.6)
MCH RBC QN AUTO: 31.4 PG (ref 26–35)
MCHC RBC AUTO-ENTMCNC: 34.5 G/DL (ref 32–34.5)
MCV RBC AUTO: 91 FL (ref 80–99.9)
PHOSPHATE SERPL-MCNC: 4.3 MG/DL (ref 2.5–4.5)
PLATELET # BLD AUTO: 113 K/UL (ref 130–450)
PMV BLD AUTO: 8.6 FL (ref 7–12)
POTASSIUM SERPL-SCNC: 4.8 MMOL/L (ref 3.5–5)
RBC # BLD AUTO: 3.12 M/UL (ref 3.8–5.8)
SODIUM SERPL-SCNC: 124 MMOL/L (ref 132–146)
WBC OTHER # BLD: 8.6 K/UL (ref 4.5–11.5)

## 2025-04-30 PROCEDURE — 6370000000 HC RX 637 (ALT 250 FOR IP)

## 2025-04-30 PROCEDURE — 97530 THERAPEUTIC ACTIVITIES: CPT

## 2025-04-30 PROCEDURE — 82962 GLUCOSE BLOOD TEST: CPT

## 2025-04-30 PROCEDURE — 83735 ASSAY OF MAGNESIUM: CPT

## 2025-04-30 PROCEDURE — 6370000000 HC RX 637 (ALT 250 FOR IP): Performed by: INTERNAL MEDICINE

## 2025-04-30 PROCEDURE — 80048 BASIC METABOLIC PNL TOTAL CA: CPT

## 2025-04-30 PROCEDURE — 85027 COMPLETE CBC AUTOMATED: CPT

## 2025-04-30 PROCEDURE — 97535 SELF CARE MNGMENT TRAINING: CPT

## 2025-04-30 PROCEDURE — 36415 COLL VENOUS BLD VENIPUNCTURE: CPT

## 2025-04-30 PROCEDURE — 6360000002 HC RX W HCPCS: Performed by: INTERNAL MEDICINE

## 2025-04-30 PROCEDURE — 6370000000 HC RX 637 (ALT 250 FOR IP): Performed by: SPECIALIST

## 2025-04-30 PROCEDURE — 84100 ASSAY OF PHOSPHORUS: CPT

## 2025-04-30 PROCEDURE — 6370000000 HC RX 637 (ALT 250 FOR IP): Performed by: STUDENT IN AN ORGANIZED HEALTH CARE EDUCATION/TRAINING PROGRAM

## 2025-04-30 PROCEDURE — 2700000000 HC OXYGEN THERAPY PER DAY

## 2025-04-30 PROCEDURE — 2500000003 HC RX 250 WO HCPCS: Performed by: INTERNAL MEDICINE

## 2025-04-30 RX ORDER — SODIUM CHLORIDE 1 G/1
1 TABLET ORAL
Status: DISCONTINUED | OUTPATIENT
Start: 2025-04-30 | End: 2025-04-30 | Stop reason: HOSPADM

## 2025-04-30 RX ORDER — MIDODRINE HYDROCHLORIDE 10 MG/1
10 TABLET ORAL
Qty: 90 TABLET | Refills: 0 | Status: SHIPPED | OUTPATIENT
Start: 2025-04-30

## 2025-04-30 RX ORDER — SODIUM BICARBONATE 650 MG/1
650 TABLET ORAL 3 TIMES DAILY
Qty: 90 TABLET | Refills: 0
Start: 2025-04-30

## 2025-04-30 RX ORDER — SODIUM CHLORIDE 1 G/1
1 TABLET ORAL
Qty: 90 TABLET | Refills: 3 | Status: SHIPPED | OUTPATIENT
Start: 2025-04-30

## 2025-04-30 RX ADMIN — POLYETHYLENE GLYCOL 3350 17 G: 17 POWDER, FOR SOLUTION ORAL at 09:29

## 2025-04-30 RX ADMIN — MORPHINE SULFATE 1 MG: 2 INJECTION, SOLUTION INTRAMUSCULAR; INTRAVENOUS at 01:06

## 2025-04-30 RX ADMIN — ANORECTAL OINTMENT: 15.7; .44; 24; 20.6 OINTMENT TOPICAL at 15:17

## 2025-04-30 RX ADMIN — SODIUM CHLORIDE, PRESERVATIVE FREE 10 ML: 5 INJECTION INTRAVENOUS at 09:29

## 2025-04-30 RX ADMIN — PANTOPRAZOLE SODIUM 40 MG: 40 TABLET, DELAYED RELEASE ORAL at 09:29

## 2025-04-30 RX ADMIN — MICONAZOLE NITRATE: 20 POWDER TOPICAL at 09:31

## 2025-04-30 RX ADMIN — SODIUM BICARBONATE 650 MG: 650 TABLET ORAL at 09:29

## 2025-04-30 RX ADMIN — OXYCODONE 5 MG: 5 TABLET ORAL at 05:59

## 2025-04-30 RX ADMIN — MIDODRINE HYDROCHLORIDE 10 MG: 10 TABLET ORAL at 13:20

## 2025-04-30 RX ADMIN — ANORECTAL OINTMENT: 15.7; .44; 24; 20.6 OINTMENT TOPICAL at 09:30

## 2025-04-30 RX ADMIN — MIDODRINE HYDROCHLORIDE 10 MG: 10 TABLET ORAL at 09:29

## 2025-04-30 RX ADMIN — ACETAMINOPHEN 650 MG: 325 TABLET ORAL at 09:52

## 2025-04-30 RX ADMIN — METOPROLOL SUCCINATE 100 MG: 100 TABLET, EXTENDED RELEASE ORAL at 09:29

## 2025-04-30 RX ADMIN — Medication 1 G: at 15:10

## 2025-04-30 RX ADMIN — SODIUM BICARBONATE 650 MG: 650 TABLET ORAL at 13:20

## 2025-04-30 ASSESSMENT — PAIN DESCRIPTION - DESCRIPTORS
DESCRIPTORS: SHARP;ACHING;DISCOMFORT
DESCRIPTORS: ACHING;SHARP;PRESSURE
DESCRIPTORS: ACHING

## 2025-04-30 ASSESSMENT — PAIN - FUNCTIONAL ASSESSMENT
PAIN_FUNCTIONAL_ASSESSMENT: PREVENTS OR INTERFERES SOME ACTIVE ACTIVITIES AND ADLS
PAIN_FUNCTIONAL_ASSESSMENT: PREVENTS OR INTERFERES SOME ACTIVE ACTIVITIES AND ADLS

## 2025-04-30 ASSESSMENT — PAIN DESCRIPTION - LOCATION
LOCATION: BACK;ABDOMEN
LOCATION: ABDOMEN
LOCATION: ABDOMEN;SACRUM

## 2025-04-30 ASSESSMENT — PAIN SCALES - GENERAL
PAINLEVEL_OUTOF10: 4
PAINLEVEL_OUTOF10: 8
PAINLEVEL_OUTOF10: 3
PAINLEVEL_OUTOF10: 3
PAINLEVEL_OUTOF10: 8
PAINLEVEL_OUTOF10: 4

## 2025-04-30 ASSESSMENT — PAIN DESCRIPTION - ORIENTATION: ORIENTATION: UPPER;LOWER

## 2025-04-30 NOTE — PROGRESS NOTES
OCCUPATIONAL THERAPY TREATMENT NOTE  [facility]  [address] [facility]         Date:2025                                                   Patient Name: Herb Rand     MRN: [id number]     : 1950     Room: [address] [place] [address]       Evaluating OT: Georgia Skelton, OTR/L; [id number]       Referring Provider and Orders/Date:    OT eval and treat  Start:  25,   End:  25,   ONE TIME,   Standing Count:  1 Occurrences,   R       Zack Lucero MD    Recommended placement: home with hospice as requested       Diagnosis:   1. Hyponatremia    2. Chronic bilateral pleural effusions    3. Pericardial effusion    4. Malignant ascites (HCC)    5. Generalized weakness         Surgery: S/P abdominal pleurx catheter placement in IR on 25       Pertinent Medical History:        Past Medical History:   Diagnosis Date    Arthritis     lower back    Asthma     Atrial fibrillation (HCC)     Diabetes mellitus (HCC)     diet controlled    H/O cardiovascular stress test 2020    Lexiscan    Hypertension     Sleep apnea           Past Surgical History:   Procedure Laterality Date    CARDIAC PROCEDURE N/A 9/3/2024    Left heart cath / coronary angiography performed by Edwin Smith MD at [facility] CARDIAC CATH/IR    CHOLECYSTECTOMY      IR GUIDED TUNNELED INTRAPERITONEAL CATH W OR WO CONTRAST PERC  2025    IR GUIDED TUNNELED INTRAPERITONEAL CATH W OR WO CONTRAST PERC 2025 [facility] CARDIAC CATH/IR LAB    PROSTATECTOMY N/A 10/12/2020    LAPAROSCOPIC ROBOTIC ASSISTED BLADDER DIVERTICULECTOMY WITH FLEXIBLE  CYSTOSCOPY performed by Vic Cadet MD at [facility] OR    TONSILLECTOMY      TURP  2017    cr    TURP N/A 2020    CYSTOSCOPY, RETROGRADE, PYELOGRAM. CYSTOGRAM. TRANSURETHRAL RESECTION PROSTATE performed by Jerson Osuna DO at [facility] OR       Precautions:  Fall Risk, metastatic gastroesophageal adenocarcinoma, O2, pleurx cathter,

## 2025-04-30 NOTE — PROGRESS NOTES
Physical Therapy Treatment Note/Plan of Care    Room #:  0518/0518-02  Patient Name: Herb Rand  YOB: 1950  MRN: [id number]    Date of Service: 4/30/2025     Tentative placement recommendation: Subacute Rehab   Equipment recommendation: Patient has needed equipment       Evaluating Physical Therapist: Herb Wynn, PT Lic.  #[id number]      Specific Provider Orders/Date/Referring Provider :  04/22/25 2000    PT evaluation and treat  Start:  04/22/25 2000,   End:  04/22/25 2000,   ONE TIME,   Standing Count:  1 Occurrences,   R       Zack Lucero MD    Admitting Diagnosis:   Hyponatremia [E87.1]  Pericardial effusion [I31.39]  Ascites, malignant (HCC) [R18.0]  Generalized weakness [R53.1]  Malignant ascites (HCC) [R18.0]  Primary cancer of esophagus with metastasis to other site (HCC) [C15.9]  Chronic bilateral pleural effusions [J90]       Surgery: none  Visit Diagnoses         Codes      Hyponatremia    -  Primary E87.1      Chronic bilateral pleural effusions     J90      Pericardial effusion     I31.39      Malignant ascites (HCC)     R18.0      Generalized weakness     R53.1            Patient Active Problem List   Diagnosis    COVID-19 virus infection    Sepsis (HCC)    Paroxysmal atrial fibrillation (HCC)    Essential hypertension    Morbid obesity (HCC)    Bladder diverticulum    CAD in native artery    Acute electrocardiography changes    Syncope and collapse    Pleural effusion    Esophageal cancer, stage IV (HCC)    Ascites, malignant (HCC)    Primary cancer of esophagus with metastasis to other site (HCC)    Moderate protein-calorie malnutrition        ASSESSMENT of Current Deficits Patient exhibits decreased strength, balance, and endurance impairing functional mobility, transfers, gait , gait distance, and tolerance to activity. Patient needing moderate assist x 2 for log rolling technique and sit to stand transfer using the mirta stedy. Pt needing extra time throughout tx session due to his  DIVERTICULECTOMY WITH FLEXIBLE  CYSTOSCOPY performed by Vic Cadet MD at [facility] [place]    TONSILLECTOMY      TURP  08/04/2017    cr    TURP N/A 6/5/2020    CYSTOSCOPY, RETROGRADE, PYELOGRAM. CYSTOGRAM. TRANSURETHRAL RESECTION PROSTATE performed by Jerson Osuna DO at [facility] OR       SUBJECTIVE:    Precautions: Up as tolerated,  external cath  ,      Social history: Patient lives with spouse in a condo     with 3 steps, with rail  to enter home.  Tub shower , grab bars       Equipment owned: Wheelchair, Cane, Wheeled Walker, Shower chair, and Elevated toilet seat,       AM-PAC Basic Mobility        AM-PAC Basic Mobility - Inpatient   How much help is needed turning from your back to your side while in a flat bed without using bedrails?: A Lot  How much help is needed moving from lying on your back to sitting on the side of a flat bed without using bedrails?: A Lot  How much help is needed moving to and from a bed to a chair?: Total  How much help is needed standing up from a chair using your arms?: A Lot  How much help is needed walking in hospital room?: Total  How much help is needed climbing 3-5 steps with a railing?: Total  AM-PAC Inpatient Mobility Raw Score : 9  AM-PAC Inpatient T-Scale Score : 30.55  Mobility Inpatient CMS 0-100% Score: 81.38  Mobility Inpatient CMS G-Code Modifier : CM    Nursing cleared patient for PT treatment.      OBJECTIVE:   Initial Evaluation  Date: 4/25/2025 Treatment Date:    4/30/2025   Short Term/ Long Term   Goals   Was pt agreeable to Eval/treatment? Yes Yes  To be met in 2 days   Pain level   7/10  abdomen 8/10  back    Bed Mobility  Using rails and head of bed elevated:     Rolling: Independent    Supine to sit: Moderate assist of 1    Sit to supine: Not assessed     Scooting: Not assessed    Using rails and head of bed elevated:     Rolling: Maximal assist of 1   Supine to sit: Moderate assist of  2   Sit to supine: Moderate assist of  2   Scooting: Moderate assist of 1

## 2025-04-30 NOTE — PLAN OF CARE
Problem: Chronic Conditions and Co-morbidities  Goal: Patient's chronic conditions and co-morbidity symptoms are monitored and maintained or improved  Outcome: Progressing  Flowsheets (Taken 4/30/2025 0800)  Care Plan - Patient's Chronic Conditions and Co-Morbidity Symptoms are Monitored and Maintained or Improved: Monitor and assess patient's chronic conditions and comorbid symptoms for stability, deterioration, or improvement     Problem: Discharge Planning  Goal: Discharge to home or other facility with appropriate resources  Outcome: Progressing  Flowsheets (Taken 4/30/2025 0800)  Discharge to home or other facility with appropriate resources: Identify barriers to discharge with patient and caregiver     Problem: Skin/Tissue Integrity  Goal: Skin integrity remains intact  Description: 1.  Monitor for areas of redness and/or skin breakdown2.  Assess vascular access sites hourly3.  Every 4-6 hours minimum:  Change oxygen saturation probe site4.  Every 4-6 hours:  If on nasal continuous positive airway pressure, respiratory therapy assess nares and determine need for appliance change or resting period  Outcome: Progressing  Flowsheets  Taken 4/30/2025 1454  Skin Integrity Remains Intact: Monitor for areas of redness and/or skin breakdown  Taken 4/30/2025 0800  Skin Integrity Remains Intact: Monitor for areas of redness and/or skin breakdown     Problem: ABCDS Injury Assessment  Goal: Absence of physical injury  Outcome: Progressing  Flowsheets (Taken 4/30/2025 1454)  Absence of Physical Injury: Implement safety measures based on patient assessment     Problem: Safety - Adult  Goal: Free from fall injury  Outcome: Progressing     Problem: Nutrition Deficit:  Goal: Optimize nutritional status  4/30/2025 1456 by Caitlyn Vuong RN  Outcome: Progressing  4/30/2025 1339 by Jolene Orta RD, LD  Outcome: Progressing  Flowsheets (Taken 4/30/2025 1334)  Nutrient intake appropriate for improving, restoring, or

## 2025-04-30 NOTE — PROGRESS NOTES
Nephrology Progress Note  Peter Quintana MD    Summary: We have been following for DAX and hyponatremia.    Interval History:    4/26:   Patient seen and examined.  No family member is present at bedside.  Chart reviewed.  Patient has Pleurx drain placement in the abdomen.    Case discussed with nursing staff.  There seems to be some confusion as to whether the patient will be transitioning to hospice measures.  Per the patient's nurse, he stated he wanted to remain full code but I reviewed the oncology note and notes from yesterday which all seem to suggest that the patient will be going home with hospice, initially last evening and then that was postponed to this morning.  Subsequently continues to remain tachycardic and hypotensive.  There is a discharge order in the chart though that apparently also has been helpful as well.  Patient is resting in bed, appears chronically ill.  However not in acute distress.  Denies palpitations, chest fluttering or tightness.  No lightheadedness.  He was up out of bed and did not feel like he was going to fall.    4/27: Patient was seen and examined at bedside.  No family present.  Case discussed with bedside RN and primary physician.  He does not want to be transition to hospice at this time.  He remains a full code though when I was discussing with the patient it appears he is interested in changing his CODE STATUS to DNR CCA.  When prompted he stated he seems to appreciate his poor prognosis and stated he did not want CPR or intubation.  This was discussed in vicinity of the nurse.    4/28: Patient seen and examined at bedside.  He is weak and fatigued.  He denies leg swelling.  No significant abdominal pain.  Changed to limited CODE STATUS.    4/29: Patient seen and examined. No family at bedside. Patient still feels weak.    4/30: Patient seen and examined.  Continues to remain weak and fatigued.  Clinically does not seem to have much change in overall status.  His  Zack Lucero MD   40 mg at 04/27/25 0939    albuterol (PROVENTIL) (2.5 MG/3ML) 0.083% nebulizer solution 2.5 mg  2.5 mg Nebulization Q6H PRN Zack Lucero MD        insulin lispro (HUMALOG,ADMELOG) injection vial 0-4 Units  0-4 Units SubCUTAneous 4x Daily AC & HS Zack Lucero MD   1 Units at 04/26/25 1737       IR US GUIDED PARACENTESIS   Final Result   Successful paracentesis.         IR GUIDED TUNNELED INTRAPERITONEAL CATH W OR WO CONTRAST PERC   Final Result   Successful placement of an abdominal PleurX catheter within the right lower   quadrant.         CT ABDOMEN PELVIS W IV CONTRAST Additional Contrast? None   Final Result   1. Stable large right pleural effusion and small left pleural effusion with   compensatory atelectatic changes at the lung bases bilaterally.   2. Small pericardial effusion.   3. Moderate amount of ascites identified within the abdomen.   4. Extensive omental caking stable and unchanged when compared to the prior   study consistent with metastatic disease.   5. Stable small lymph nodes identified scattered throughout the   retroperitoneum and celiac axis concerning for metastatic process.   6. Unchanged abnormal appearance of the bladder with wall thickening   identified along the right lateral aspect. Findings concerning for underlying   malignancy.  Cystography recommended for further evaluation.   7. Diverticulosis with no evidence of diverticulitis.   8. Mild anasarca seen within the soft tissues.         XR CHEST (2 VW)   Final Result   1. Increasing hazy density in the right mid and lower lung. Findings may be   due to atelectasis, pneumonia, or asymmetric edema.   2. Cardiomegaly.               LAB DATA     BMP, Mg, Phos    Lab Results   Component Value Date    CREATININE 1.3 (H) 04/30/2025    CREATININE 1.3 (H) 04/29/2025    CREATININE 1.2 04/28/2025    CREATININE 1.3 (H) 04/27/2025    CREATININE 1.4 (H) 04/26/2025    CREATININE 1.4 (H) 04/25/2025    CREATININE 1.5 (H)

## 2025-04-30 NOTE — PROGRESS NOTES
Cardiology  Progress Note      SUBJECTIVE: Patient was sleeping flat in bed when I came to see him earlier this morning.  He looked very weak but no acute distress     Current Inpatient Medications  Current Facility-Administered Medications: midodrine (PROAMATINE) tablet 10 mg, 10 mg, Oral, TID WC  metoprolol succinate (TOPROL XL) extended release tablet 100 mg, 100 mg, Oral, BID  fentaNYL (DURAGESIC) 25 MCG/HR 1 patch, 1 patch, TransDERmal, Q72H  glucose chewable tablet 16 g, 4 tablet, Oral, PRN  dextrose bolus 10% 125 mL, 125 mL, IntraVENous, PRN **OR** dextrose bolus 10% 250 mL, 250 mL, IntraVENous, PRN  glucagon injection 1 mg, 1 mg, SubCUTAneous, PRN  dextrose 10 % infusion, , IntraVENous, Continuous PRN  sodium bicarbonate tablet 650 mg, 650 mg, Oral, TID  morphine (PF) injection 1 mg, 1 mg, IntraVENous, Q2H PRN  menthol-zinc oxide (CALMOSEPTINE) 0.44-20.6 % ointment, , Topical, TID  miconazole (MICOTIN) 2 % powder, , Topical, Daily  terazosin (HYTRIN) capsule 5 mg (Patient Supplied), 5 mg, Oral, Nightly  oxyCODONE (ROXICODONE) immediate release tablet 5 mg, 5 mg, Oral, TID PRN  sodium chloride flush 0.9 % injection 5-40 mL, 5-40 mL, IntraVENous, PRN  0.9 % sodium chloride infusion, , IntraVENous, PRN  potassium chloride (KLOR-CON M) extended release tablet 40 mEq, 40 mEq, Oral, PRN **OR** potassium bicarb-citric acid (EFFER-K) effervescent tablet 40 mEq, 40 mEq, Oral, PRN **OR** potassium chloride 10 mEq/100 mL IVPB (Peripheral Line), 10 mEq, IntraVENous, PRN  potassium chloride 10 mEq/100 mL IVPB (Peripheral Line), 10 mEq, IntraVENous, PRN  magnesium sulfate 2000 mg in 50 mL IVPB premix, 2,000 mg, IntraVENous, PRN  ondansetron (ZOFRAN-ODT) disintegrating tablet 4 mg, 4 mg, Oral, Q8H PRN **OR** ondansetron (ZOFRAN) injection 4 mg, 4 mg, IntraVENous, Q6H PRN  polyethylene glycol (GLYCOLAX) packet 17 g, 17 g, Oral, Daily PRN  acetaminophen (TYLENOL) tablet 650 mg, 650 mg, Oral, Q6H PRN **OR** acetaminophen

## 2025-04-30 NOTE — CARE COORDINATION
4/30/25 1143  CM note: no covid testing. S/P abdominal pleurx catheter placement in IR on 4/24/25, 4350 cc removed same day; 4100 cc removed 4/29/25. Orders placed today to drain pleurx daily 1-2L. Hx metastatic gastroesophageal adenocarcinoma, pt follows w/ Dr Munson at the [facility]. Oncology, nephrology, cardiology, wound care, and PT/OT following.  Limited code, meds only. Pt and family requesting SNF under Medicare at this time, and depending on his progress will decide on hospice. Updated [place] w/ [facility] on above and she is following. Discharge order noted. Discharge plan is [facility]. Per Ramesh Alonzo liaison, they can accept pt. No precert needed. ISAÍAS is signed. HENS completed. Notified Caitlyn, pts RN, of need to drain pts pleurx prior to pts dc today, and need to send enough pleurx catheters to SNF for 2 days. Once completed ambulance transport will be arranged for transport. CM will follow. Electronically signed by Janneth Stubbs RN on 4/30/2025 at 11:51 AM     Update: 4/30/25 1304 Arranged ambulance transport with [facility] for  at 3:30 pm. Ambulance form on soft chart. Ntified pt, pts son & wife at the bedside, pts RORO Brady, charge nurse Carlee, and SNF liaison Cheri of  time. Notified Cheri of pts current O2 needs at 2L nc. Electronically signed by Janneth Stubbs RN on 4/30/2025 at 1:08 PM

## 2025-04-30 NOTE — PLAN OF CARE
Problem: Chronic Conditions and Co-morbidities  Goal: Patient's chronic conditions and co-morbidity symptoms are monitored and maintained or improved  4/29/2025 2057 by Mandy Salvador RN  Outcome: Progressing  4/29/2025 1814 by Caitlyn Vuong RN  Outcome: Progressing  Flowsheets  Taken 4/29/2025 1542 by Caitlyn Vuong RN  Care Plan - Patient's Chronic Conditions and Co-Morbidity Symptoms are Monitored and Maintained or Improved: Monitor and assess patient's chronic conditions and comorbid symptoms for stability, deterioration, or improvement  Taken 4/29/2025 0809 by Cheri Servin RN  Care Plan - Patient's Chronic Conditions and Co-Morbidity Symptoms are Monitored and Maintained or Improved: Monitor and assess patient's chronic conditions and comorbid symptoms for stability, deterioration, or improvement     Problem: Discharge Planning  Goal: Discharge to home or other facility with appropriate resources  4/29/2025 2057 by Mandy Salvador RN  Outcome: Progressing  4/29/2025 1814 by Caitlyn Vuong RN  Outcome: Progressing  Flowsheets  Taken 4/29/2025 1542 by Caitlyn Vuong RN  Discharge to home or other facility with appropriate resources: Identify barriers to discharge with patient and caregiver  Taken 4/29/2025 0809 by Cheri Servin RN  Discharge to home or other facility with appropriate resources: Identify barriers to discharge with patient and caregiver     Problem: Skin/Tissue Integrity  Goal: Skin integrity remains intact  Description: 1.  Monitor for areas of redness and/or skin breakdown2.  Assess vascular access sites hourly3.  Every 4-6 hours minimum:  Change oxygen saturation probe site4.  Every 4-6 hours:  If on nasal continuous positive airway pressure, respiratory therapy assess nares and determine need for appliance change or resting period  4/29/2025 2057 by Mandy Salvador RN  Outcome: Progressing  4/29/2025 1814 by Caitlyn Vuong RN  Outcome: Progressing  Flowsheets  Taken  4/29/2025 1542 by Caitlyn Vuong RN  Skin Integrity Remains Intact: Monitor for areas of redness and/or skin breakdown  Taken 4/29/2025 0809 by Cheri Servin RN  Skin Integrity Remains Intact: Monitor for areas of redness and/or skin breakdown     Problem: ABCDS Injury Assessment  Goal: Absence of physical injury  4/29/2025 2057 by Mandy Salvador RN  Outcome: Progressing  4/29/2025 1814 by Caitlyn Vuong RN  Outcome: Progressing     Problem: Safety - Adult  Goal: Free from fall injury  4/29/2025 2057 by Mandy Salvador RN  Outcome: Progressing  4/29/2025 1814 by Caitlyn Vuong RN  Outcome: Progressing     Problem: Nutrition Deficit:  Goal: Optimize nutritional status  4/29/2025 2057 by Mandy Salvador RN  Outcome: Progressing  4/29/2025 1814 by Caitlyn Vuong RN  Outcome: Progressing     Problem: Pain  Goal: Verbalizes/displays adequate comfort level or baseline comfort level  4/29/2025 2057 by Mandy Salvador RN  Outcome: Progressing  4/29/2025 1814 by Caitlyn Vuong RN  Outcome: Progressing  Flowsheets (Taken 4/29/2025 0809 by Cheri Servin RN)  Verbalizes/displays adequate comfort level or baseline comfort level:   Encourage patient to monitor pain and request assistance   Assess pain using appropriate pain scale     Problem: Skin/Tissue Integrity - Adult  Goal: Skin integrity remains intact  Description: 1.  Monitor for areas of redness and/or skin breakdown2.  Assess vascular access sites hourly3.  Every 4-6 hours minimum:  Change oxygen saturation probe site4.  Every 4-6 hours:  If on nasal continuous positive airway pressure, respiratory therapy assess nares and determine need for appliance change or resting period  4/29/2025 2057 by Mandy Salvador RN  Outcome: Progressing  4/29/2025 1814 by Caitlyn Vuong RN  Outcome: Progressing  Flowsheets  Taken 4/29/2025 1542 by Caitlyn Vuong RN  Skin Integrity Remains Intact: Monitor for areas of redness and/or skin breakdown  Taken 4/29/2025 0809  by Gareth, Cheri, RN  Skin Integrity Remains Intact: Monitor for areas of redness and/or skin breakdown     Problem: Gastrointestinal - Adult  Goal: Minimal or absence of nausea and vomiting  4/29/2025 2057 by Mandy Salvador RN  Outcome: Progressing  4/29/2025 1814 by Caitlyn Vuong RN  Outcome: Progressing     Problem: Respiratory - Adult  Goal: Achieves optimal ventilation and oxygenation  Outcome: Progressing     Problem: Cardiovascular - Adult  Goal: Maintains optimal cardiac output and hemodynamic stability  Outcome: Progressing  Goal: Absence of cardiac dysrhythmias or at baseline  Outcome: Progressing     Problem: Musculoskeletal - Adult  Goal: Return mobility to safest level of function  Outcome: Progressing  Goal: Maintain proper alignment of affected body part  Outcome: Progressing  Goal: Return ADL status to a safe level of function  Outcome: Progressing     Problem: Genitourinary - Adult  Goal: Absence of urinary retention  Outcome: Progressing  Goal: Urinary catheter remains patent  Outcome: Progressing

## 2025-04-30 NOTE — PROGRESS NOTES
Comprehensive Nutrition Assessment    Type and Reason for Visit:  Reassess    Nutrition Recommendations/Plan:   Continue Current Diet (Regular Low Na+)  Continue ONS (Diabetic) TID  Continue Gunner BID  Consult RD as needed      Malnutrition Assessment:  Malnutrition Status:  Moderate malnutrition (04/24/25 1320)    Context:  Chronic Illness     Findings of the 6 clinical characteristics of malnutrition:  Energy Intake:  Mild decrease in energy intake (poor po intake PTA per pt)  Weight Loss:  10% over 6 months (7.7% wt loss in the last 8 months)     Body Fat Loss:  No body fat loss     Muscle Mass Loss:  Mild muscle mass loss Temples (temporalis), Clavicles (pectoralis & deltoids)  Fluid Accumulation:  Severe Extremities   Strength:  Not Performed    Nutrition Assessment:    Pt admit for hyponatremia. Found to have malignant ascites, chronic bilateral PE, pericardial effusion, DAX, divterticulosis. PMHx: esophageal cancer stage IV, COPD, PE, anasarca, Afib, HTN, HLD, CAD, cardiomegaly, sepsis, syncope & collapse. Pt meets criteria for moderate malnutrition. Pt reports ongoing poor appetite, consuming 50-75% of meals, 100% Glucerna ONS. Will add Gunner BID for wound healing, encourage intake and continue inpatient monitoring. 4/30/25: F/up:  Pt s/p abdominal pleurx catheter placement in IR on 4/24/25, 4350 cc removed same day; 4100 cc removed 4/29/25. Pt for d/c to SNF today. Continue current ONS regimen at nursing home, f/up per policy.    Nutrition Related Findings:    A/O x4, I/O -4142, abd wdl, +BS x4, labs noted Wound Type: Multiple, Skin Tears (abd lateral lower Rt puncture POA, coccyx skin tear-excoriation)       Current Nutrition Intake & Therapies:    Average Meal Intake: 51-75%  Average Supplements Intake: %  ADULT DIET; Regular; Low Sodium (2 gm)  ADULT ORAL NUTRITION SUPPLEMENT; Breakfast, Lunch, Dinner; Diabetic Oral Supplement  ADULT ORAL NUTRITION SUPPLEMENT; Breakfast, Dinner; Wound Healing

## 2025-05-12 NOTE — DISCHARGE SUMMARY
Internal Medicine  Discharge Summary    NAME: Herb Rand  :  1950  MRN:  [id number]  PCP:Gabe Goodwin MD    ADMITTED: 2025      DISCHARGED: 2025    ADMITTING PHYSICIAN:  Zack Lucero MD    CONSULTANT(S):   IP CONSULT TO SOCIAL WORK  IP CONSULT TO ONCOLOGY  IP CONSULT TO NEPHROLOGY  IP CONSULT TO HOSPICE  IP CONSULT TO CARDIOLOGY     ADMITTING DIAGNOSIS:   Hyponatremia  Coronary artery disease  Weakness and tachycardia  Cardiomegaly  Anasarca  Metastatic stage IV esophageal CA  Recurrent ascites  Hyperlipidemia  Hypertension  Morbid obesity  COPD  Pleural effusion    DISCHARGE DIAGNOSES:   Same    BRIEF HISTORY OF PRESENT ILLNESS:   This is a very pleasant 74 years old morbidly obese white male with past medical history of stage 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  Generalized weakness continues he does not have any fever chills no nausea vomiting diarrhea no abdominal pain.  Patient does not complain of any shortness of breath does not complain of any chest pain he does chemotherapy and immunotherapy at the [facility].  He is not complaining of any abdominal pain lightheadedness dysuria hematuria hematochezia or melena.  Patient admitted for further evaluation and treatment because of the recurrent ascites I discussed with him regarding the placement of a Pleurx catheter so that he can be drained at home.    LABS::  Lab Results   Component Value Date    WBC 8.6 2025    HGB 9.8 (L) 2025    HCT 28.4 (L) 2025     (L) 2025     (L) 2025    K 4.8 2025    CL 91 (L) 2025    CREATININE 1.3 (H) 2025    BUN 67 (H) 2025    CO2 21 (L) 2025    GLUCOSE 117 (H) 2025    ALT 26 2025    AST 24 2025

## 2025-05-20 NOTE — PROGRESS NOTES
Physician Progress Note      PATIENT:               YONI RAYO  CSN #:                  [id number]  :                       1950  ADMIT DATE:       2025 10:21 AM  DISCH DATE:        2025 4:31 PM  RESPONDING  PROVIDER #:        Zack Lucero MD          QUERY TEXT:    Please clarify the patient?s nutritional status:    The clinical indicators include:  --74 year old page stage IV esophageal cancer with metastatic disease to   peritoneal cavity (25 Dr. Lucero H&P)  --Malnutrition Assessment: Malnutrition Status:  Moderate malnutrition   (25 1320). Context:  Chronic Illness. Findings of the 6 clinical   characteristics of malnutrition: Energy Intake:  Mild decrease in energy   intake (poor po intake PTA per pt). Weight Loss:  10% over 6 months (7.7% wt   loss in the last 8 months). Body Fat Loss:  No body fat loss. Muscle Mass   Loss:  Mild muscle mass loss Temples (temporalis), Clavicles (pectoralis &   deltoids). Fluid Accumulation:  Severe Extremities.  Strength:  Not   Performed (25 registered dietician consult)    --oral nutritional supplement TID (ordered 25)  --assess skin (ordered 25)  --daily weights, intake and output (ordered 25)  Options provided:  -- Protein calorie malnutrition moderate  -- Other - I will add my own diagnosis  -- Disagree - Not applicable / Not valid  -- Disagree - Clinically unable to determine / Unknown  -- Refer to Clinical Documentation Reviewer    PROVIDER RESPONSE TEXT:    This patient has moderate protein calorie malnutrition.    Query created by: Nivia Vega on 2025 1:49 PM      Electronically signed by:  Zack Lucero MD 2025 9:37 AM

## (undated) PROCEDURE — 0W9G3ZZ DRAINAGE OF PERITONEAL CAVITY, PERCUTANEOUS APPROACH: ICD-10-PCS

## (undated) PROCEDURE — 0W9F30Z DRAINAGE OF ABDOMINAL WALL WITH DRAINAGE DEVICE, PERCUTANEOUS APPROACH: ICD-10-PCS

## (undated) DEVICE — CLIP INT XL YEL POLYMER HEM-O-LOK WECK

## (undated) DEVICE — ANCHOR TISSUE RETRIEVAL SYSTEM, BAG SIZE 175 ML, PORT SIZE 10 MM: Brand: ANCHOR TISSUE RETRIEVAL SYSTEM

## (undated) DEVICE — GAUZE,SPONGE,4"X4",16PLY,XRAY,STRL,LF: Brand: MEDLINE

## (undated) DEVICE — SET INST DAVINCI LAP

## (undated) DEVICE — BLADELESS OBTURATOR: Brand: WECK VISTA

## (undated) DEVICE — CATHETER F BLLN 5CC 22FR 2 W HYDRGEL COAT LESS TRAUM LUB

## (undated) DEVICE — 3M™ IOBAN™ 2 ANTIMICROBIAL INCISE DRAPE 6650EZ: Brand: IOBAN™ 2

## (undated) DEVICE — READY WET SKIN SCRUB TRAY-LF: Brand: MEDLINE INDUSTRIES, INC.

## (undated) DEVICE — SET INST DAVINCI HEMLOCK APPLIERS

## (undated) DEVICE — Z INACTIVE USE 2635504 SOLUTION IRRIG 3000ML 1.5% GL USP UROMATIC CONT

## (undated) DEVICE — BAG DRNGE COMB PK

## (undated) DEVICE — ELECTRODE PT RET AD L9FT HI MOIST COND ADH HYDRGEL CORDED

## (undated) DEVICE — GLOVE SURG SZ 75 L12IN FNGR THK94MIL TRNSLUC YEL LTX

## (undated) DEVICE — CIRCON 30/70 URO LENS

## (undated) DEVICE — AGENT HEMSTAT W4XL8IN OXIDIZED REGENERATED CELOS ABSRB

## (undated) DEVICE — JELLY,LUBE,STERILE,FLIP TOP,TUBE,4-OZ: Brand: MEDLINE

## (undated) DEVICE — NEEDLE SPNL L3.5IN PNK HUB S STL REG WALL FIT STYL W/ QNCKE

## (undated) DEVICE — Z DISCONTINUED USE 2271023 SYRINGE IRRIGATION TOOM 70 CC CATH LUER ADPT TIP PLAS STRL

## (undated) DEVICE — CATHETER URETH 20FR BLLN 5CC STD LTX HYDRGEL 2 W F BARDX

## (undated) DEVICE — SOLUTION IV 1000ML 0.9% SOD CHL PH 5 INJ USP VIAFLX PLAS

## (undated) DEVICE — TRI-LUMEN FILTERED TUBE SET WITH ACTIVATED CHARCOAL FILTER: Brand: AIRSEAL

## (undated) DEVICE — ELECTRODE ENDOSCP 26FR 0.012IN WIRE WHT R ANG LOOP MPLR

## (undated) DEVICE — DOUBLE BASIN SET: Brand: MEDLINE INDUSTRIES, INC.

## (undated) DEVICE — MARYLAND BIPOLAR FORCEPS: Brand: ENDOWRIST

## (undated) DEVICE — STANDARD HYPODERMIC NEEDLE,POLYPROPYLENE HUB: Brand: MONOJECT

## (undated) DEVICE — GLOVE SURG SZ 75 STD WHT LTX SYN POLYMER BEAD REINF ANTI RL

## (undated) DEVICE — CYSTO PACK: Brand: MEDLINE INDUSTRIES, INC.

## (undated) DEVICE — CLOTH SURG PREP PREOPERATIVE CHLORHEXIDINE GLUC 2% READYPREP

## (undated) DEVICE — LARGE NEEDLE DRIVER: Brand: ENDOWRIST

## (undated) DEVICE — BASIC SINGLE BASIN 1-LF: Brand: MEDLINE INDUSTRIES, INC.

## (undated) DEVICE — SYRINGE 20ML LL S/C 50

## (undated) DEVICE — SET INST DAVINCI ACCESSORIES

## (undated) DEVICE — Z DUP USE 2257490 ADHESIVE SKIN CLSRE 036ML TPCL 2CTL CNCRLTE HIGH VSCSTY DRMB

## (undated) DEVICE — SYRINGE,TOOMEY,IRRIGATION,70CC,STERILE: Brand: MEDLINE

## (undated) DEVICE — 1071 S-DRP URO STLE-GAMA 10/BX,4X/C: Brand: STERI-DRAPE™

## (undated) DEVICE — GLOVE ORANGE PI 7 1/2   MSG9075

## (undated) DEVICE — TOWEL,OR,DSP,ST,BLUE,STD,6/PK,12PK/CS: Brand: MEDLINE

## (undated) DEVICE — SCISSORS SURG DIA8MM MPLR CRV ENDOWRIST

## (undated) DEVICE — TIP COVER ACCESSORY

## (undated) DEVICE — GARMENT,MEDLINE,DVT,INT,CALF,MED, GEN2: Brand: MEDLINE

## (undated) DEVICE — DRAINBAG,ANTI-REFLUX TOWER,L/F,2000ML,LL: Brand: MEDLINE

## (undated) DEVICE — AIRSEAL 12MM ACCESS PORT AND OBTURATOR WITH BLADELESS OPTICAL TIP, 150MM LENGTH: Brand: AIRSEAL

## (undated) DEVICE — NEEDLE HYPO 21GA L1.5IN GRN POLYPR HUB S STL REG BVL STR

## (undated) DEVICE — TROCAR: Brand: KII FIOS FIRST ENTRY

## (undated) DEVICE — Z INACTIVE USE 2641839 CLIP INT M L POLYMER LOK LIG HEM O LOK

## (undated) DEVICE — EVACUATOR URO BLDR W/ ADPT UROVAC

## (undated) DEVICE — CABLE ENDOSCP L10FT ACT DISP

## (undated) DEVICE — COVER,LIGHT HANDLE,FLX,1/PK: Brand: MEDLINE INDUSTRIES, INC.

## (undated) DEVICE — SET INST MINOR PROCEDURE

## (undated) DEVICE — CATHETER URETH 22FR BLLN 30CC L16IN SIL 3 W F DISP

## (undated) DEVICE — INSUFFLATION NEEDLE TO ESTABLISH PNEUMOPERITONEUM.: Brand: INSUFFLATION NEEDLE

## (undated) DEVICE — ARM DRAPE

## (undated) DEVICE — CANNULA SEAL

## (undated) DEVICE — Device

## (undated) DEVICE — TUBING, SUCTION, 1/4" X 10', STRAIGHT: Brand: MEDLINE

## (undated) DEVICE — HF-RESECTION ELECTRODE PLASMALOOP LOOP, LARGE, 24 FR., 12°/16°, ESG TURIS: Brand: OLYMPUS

## (undated) DEVICE — BAG DRNGE 2000ML UROLOGY ANTI REFLX CHMBR SAMP PRT

## (undated) DEVICE — PROGRASP FORCEPS: Brand: ENDOWRIST

## (undated) DEVICE — CATHETER URETH BLLN 5CC 20FR F 2 W SPEC COUNCL MOD SHT OPN

## (undated) DEVICE — PUMP SUC IRR TBNG L10FT W/ HNDPC ASSEMB STRYKEFLOW 2

## (undated) DEVICE — BAG DRAINAGE CONTAINER 15 LT FLUID COLLCTN

## (undated) DEVICE — SOLUTION IV IRRIG WATER 1000ML POUR BRL 2F7114

## (undated) DEVICE — 1.5L THIN WALL CAN: Brand: CRD

## (undated) DEVICE — BLADE CLIPPER GEN PURP NS

## (undated) DEVICE — SCOPE DAVINCI XI 0 DEG W/CORD

## (undated) DEVICE — GOWN,SIRUS,FABRNF,XL,20/CS: Brand: MEDLINE

## (undated) DEVICE — Z DUP USE 2139333 GUIDEWIRE UROLOGICAL STR STD 0.035 IN X150 CM REG ZIPWIRE LF

## (undated) DEVICE — GOWN,SIRUS,NONRNF,SETINSLV,XL,20/CS: Brand: MEDLINE

## (undated) DEVICE — NEEDLE SPNL 22GA L3.5IN BLK HUB S STL REG WALL FIT STYL W/

## (undated) DEVICE — Z DUP USE 2641840 CLIP INT L POLYMER LOK LIG HEM O LOK

## (undated) DEVICE — 40586 ADVANCED TRENDELENBURG POSITIONING KIT: Brand: 40586 ADVANCED TRENDELENBURG POSITIONING KIT

## (undated) DEVICE — STRAP,CATHETER,ADJBL FOAM,HOOK&LOOP: Brand: MEDLINE

## (undated) DEVICE — CATHETER URET 5FR L70CM OPN END SGL LUMN INJ HUB FLEXIMA

## (undated) DEVICE — CIRCON 21F CYSTO TRAY

## (undated) DEVICE — TIBURON GENERAL ENDOSCOPY DRAPE: Brand: CONVERTORS

## (undated) DEVICE — SURGICAL PROCEDURE PACK ROBOTIC

## (undated) DEVICE — SYRINGE MED 10ML LUERLOCK TIP W/O SFTY DISP

## (undated) DEVICE — Z INACTIVE USE 2635503 SOLUTION IRRIG 3000ML ST H2O USP UROMATIC PLAS CONT

## (undated) DEVICE — COLUMN DRAPE

## (undated) DEVICE — CAMERA STRYKER 1488 HD GEN

## (undated) DEVICE — DRAIN INCISION 15FR SILICONE HUBLESS

## (undated) DEVICE — PATIENT RETURN ELECTRODE, SINGLE-USE, CONTACT QUALITY MONITORING, ADULT, WITH 9FT CORD, FOR PATIENTS WEIGING OVER 33LBS. (15KG): Brand: MEGADYNE

## (undated) DEVICE — CIRCON CF RESECTOSCOPE URO IGLESIAS